# Patient Record
Sex: MALE | Race: WHITE | Employment: OTHER | URBAN - METROPOLITAN AREA
[De-identification: names, ages, dates, MRNs, and addresses within clinical notes are randomized per-mention and may not be internally consistent; named-entity substitution may affect disease eponyms.]

---

## 2019-07-11 ENCOUNTER — APPOINTMENT (OUTPATIENT)
Dept: CT IMAGING | Age: 79
End: 2019-07-11
Payer: MEDICARE

## 2019-07-11 ENCOUNTER — HOSPITAL ENCOUNTER (OUTPATIENT)
Age: 79
Setting detail: OBSERVATION
Discharge: HOME | End: 2019-07-13
Attending: EMERGENCY MEDICINE | Admitting: EMERGENCY MEDICINE
Payer: MEDICARE

## 2019-07-11 ENCOUNTER — APPOINTMENT (OUTPATIENT)
Dept: GENERAL RADIOLOGY | Age: 79
End: 2019-07-11
Payer: MEDICARE

## 2019-07-11 DIAGNOSIS — N40.0 ENLARGED PROSTATE: ICD-10-CM

## 2019-07-11 DIAGNOSIS — I20.0 UNSTABLE ANGINA (HCC): Primary | ICD-10-CM

## 2019-07-11 DIAGNOSIS — K44.9 HIATAL HERNIA: ICD-10-CM

## 2019-07-11 DIAGNOSIS — R91.1 PULMONARY NODULE: ICD-10-CM

## 2019-07-11 DIAGNOSIS — I25.10 CORONARY ARTERY CALCIFICATION SEEN ON CAT SCAN: ICD-10-CM

## 2019-07-11 DIAGNOSIS — R51.9 ACUTE NONINTRACTABLE HEADACHE, UNSPECIFIED HEADACHE TYPE: ICD-10-CM

## 2019-07-11 PROBLEM — R07.9 CHEST PAIN: Status: ACTIVE | Noted: 2019-07-11

## 2019-07-11 LAB
A/G RATIO: 1.2 (ref 1.1–2.2)
ALBUMIN SERPL-MCNC: 3.6 G/DL (ref 3.4–5)
ALP BLD-CCNC: 50 U/L (ref 40–129)
ALT SERPL-CCNC: 11 U/L (ref 10–40)
ANION GAP SERPL CALCULATED.3IONS-SCNC: 9 MMOL/L (ref 3–16)
AST SERPL-CCNC: 14 U/L (ref 15–37)
BASOPHILS ABSOLUTE: 0.1 K/UL (ref 0–0.2)
BASOPHILS RELATIVE PERCENT: 1.3 %
BILIRUB SERPL-MCNC: 0.3 MG/DL (ref 0–1)
BUN BLDV-MCNC: 24 MG/DL (ref 7–20)
CALCIUM SERPL-MCNC: 8.7 MG/DL (ref 8.3–10.6)
CHLORIDE BLD-SCNC: 102 MMOL/L (ref 99–110)
CO2: 23 MMOL/L (ref 21–32)
CREAT SERPL-MCNC: 1.4 MG/DL (ref 0.8–1.3)
EOSINOPHILS ABSOLUTE: 0.2 K/UL (ref 0–0.6)
EOSINOPHILS RELATIVE PERCENT: 3.9 %
GFR AFRICAN AMERICAN: 59
GFR NON-AFRICAN AMERICAN: 49
GLOBULIN: 2.9 G/DL
GLUCOSE BLD-MCNC: 100 MG/DL (ref 70–99)
HCT VFR BLD CALC: 31.6 % (ref 40.5–52.5)
HEMOGLOBIN: 10.9 G/DL (ref 13.5–17.5)
LYMPHOCYTES ABSOLUTE: 1.5 K/UL (ref 1–5.1)
LYMPHOCYTES RELATIVE PERCENT: 23.8 %
MCH RBC QN AUTO: 34.8 PG (ref 26–34)
MCHC RBC AUTO-ENTMCNC: 34.4 G/DL (ref 31–36)
MCV RBC AUTO: 101.3 FL (ref 80–100)
MONOCYTES ABSOLUTE: 0.5 K/UL (ref 0–1.3)
MONOCYTES RELATIVE PERCENT: 7.3 %
NEUTROPHILS ABSOLUTE: 4 K/UL (ref 1.7–7.7)
NEUTROPHILS RELATIVE PERCENT: 63.7 %
PDW BLD-RTO: 13.6 % (ref 12.4–15.4)
PLATELET # BLD: 227 K/UL (ref 135–450)
PMV BLD AUTO: 7.2 FL (ref 5–10.5)
POTASSIUM SERPL-SCNC: 4.2 MMOL/L (ref 3.5–5.1)
RBC # BLD: 3.12 M/UL (ref 4.2–5.9)
SODIUM BLD-SCNC: 134 MMOL/L (ref 136–145)
TOTAL PROTEIN: 6.5 G/DL (ref 6.4–8.2)
TROPONIN: <0.01 NG/ML
WBC # BLD: 6.3 K/UL (ref 4–11)

## 2019-07-11 PROCEDURE — 6360000002 HC RX W HCPCS: Performed by: EMERGENCY MEDICINE

## 2019-07-11 PROCEDURE — 96374 THER/PROPH/DIAG INJ IV PUSH: CPT

## 2019-07-11 PROCEDURE — 80053 COMPREHEN METABOLIC PANEL: CPT

## 2019-07-11 PROCEDURE — 99285 EMERGENCY DEPT VISIT HI MDM: CPT

## 2019-07-11 PROCEDURE — 93005 ELECTROCARDIOGRAM TRACING: CPT | Performed by: INTERNAL MEDICINE

## 2019-07-11 PROCEDURE — 70450 CT HEAD/BRAIN W/O DYE: CPT

## 2019-07-11 PROCEDURE — G0378 HOSPITAL OBSERVATION PER HR: HCPCS

## 2019-07-11 PROCEDURE — 6370000000 HC RX 637 (ALT 250 FOR IP): Performed by: EMERGENCY MEDICINE

## 2019-07-11 PROCEDURE — 85025 COMPLETE CBC W/AUTO DIFF WBC: CPT

## 2019-07-11 PROCEDURE — 71046 X-RAY EXAM CHEST 2 VIEWS: CPT

## 2019-07-11 PROCEDURE — 74176 CT ABD & PELVIS W/O CONTRAST: CPT

## 2019-07-11 PROCEDURE — 84484 ASSAY OF TROPONIN QUANT: CPT

## 2019-07-11 RX ORDER — ONDANSETRON 2 MG/ML
4 INJECTION INTRAMUSCULAR; INTRAVENOUS EVERY 6 HOURS PRN
Status: DISCONTINUED | OUTPATIENT
Start: 2019-07-11 | End: 2019-07-13 | Stop reason: HOSPADM

## 2019-07-11 RX ORDER — LATANOPROST 50 UG/ML
1 SOLUTION/ DROPS OPHTHALMIC NIGHTLY
Status: DISCONTINUED | OUTPATIENT
Start: 2019-07-12 | End: 2019-07-13 | Stop reason: HOSPADM

## 2019-07-11 RX ORDER — ATORVASTATIN CALCIUM 40 MG/1
40 TABLET, FILM COATED ORAL NIGHTLY
Status: DISCONTINUED | OUTPATIENT
Start: 2019-07-12 | End: 2019-07-13 | Stop reason: HOSPADM

## 2019-07-11 RX ORDER — NITROGLYCERIN 0.4 MG/1
0.4 TABLET SUBLINGUAL EVERY 5 MIN PRN
Status: COMPLETED | OUTPATIENT
Start: 2019-07-11 | End: 2019-07-12

## 2019-07-11 RX ORDER — LISINOPRIL 10 MG/1
10 TABLET ORAL
COMMUNITY

## 2019-07-11 RX ORDER — ACETAMINOPHEN 325 MG/1
650 TABLET ORAL ONCE
Status: COMPLETED | OUTPATIENT
Start: 2019-07-11 | End: 2019-07-11

## 2019-07-11 RX ORDER — VENLAFAXINE HYDROCHLORIDE 37.5 MG/1
75 CAPSULE, EXTENDED RELEASE ORAL DAILY
Status: DISCONTINUED | OUTPATIENT
Start: 2019-07-12 | End: 2019-07-13 | Stop reason: HOSPADM

## 2019-07-11 RX ORDER — SODIUM CHLORIDE 0.9 % (FLUSH) 0.9 %
10 SYRINGE (ML) INJECTION EVERY 12 HOURS SCHEDULED
Status: DISCONTINUED | OUTPATIENT
Start: 2019-07-12 | End: 2019-07-13 | Stop reason: HOSPADM

## 2019-07-11 RX ORDER — BUPROPION HYDROCHLORIDE 150 MG/1
150 TABLET, EXTENDED RELEASE ORAL 2 TIMES DAILY
Status: DISCONTINUED | OUTPATIENT
Start: 2019-07-12 | End: 2019-07-13 | Stop reason: HOSPADM

## 2019-07-11 RX ORDER — ASPIRIN 81 MG/1
81 TABLET, CHEWABLE ORAL DAILY
Status: DISCONTINUED | OUTPATIENT
Start: 2019-07-12 | End: 2019-07-13 | Stop reason: HOSPADM

## 2019-07-11 RX ORDER — LISINOPRIL 10 MG/1
10 TABLET ORAL DAILY
Status: DISCONTINUED | OUTPATIENT
Start: 2019-07-12 | End: 2019-07-12

## 2019-07-11 RX ORDER — CARVEDILOL 6.25 MG/1
12.5 TABLET ORAL 2 TIMES DAILY WITH MEALS
Status: DISCONTINUED | OUTPATIENT
Start: 2019-07-12 | End: 2019-07-13 | Stop reason: HOSPADM

## 2019-07-11 RX ORDER — MAGNESIUM SULFATE 1 G/100ML
1 INJECTION INTRAVENOUS PRN
Status: DISCONTINUED | OUTPATIENT
Start: 2019-07-11 | End: 2019-07-13 | Stop reason: HOSPADM

## 2019-07-11 RX ORDER — POTASSIUM CHLORIDE 7.45 MG/ML
10 INJECTION INTRAVENOUS PRN
Status: DISCONTINUED | OUTPATIENT
Start: 2019-07-11 | End: 2019-07-13 | Stop reason: HOSPADM

## 2019-07-11 RX ORDER — SODIUM CHLORIDE 0.9 % (FLUSH) 0.9 %
10 SYRINGE (ML) INJECTION PRN
Status: DISCONTINUED | OUTPATIENT
Start: 2019-07-11 | End: 2019-07-13 | Stop reason: HOSPADM

## 2019-07-11 RX ORDER — CARVEDILOL 12.5 MG/1
12.5 TABLET ORAL 2 TIMES DAILY
COMMUNITY

## 2019-07-11 RX ORDER — ONDANSETRON 2 MG/ML
4 INJECTION INTRAMUSCULAR; INTRAVENOUS ONCE
Status: COMPLETED | OUTPATIENT
Start: 2019-07-11 | End: 2019-07-11

## 2019-07-11 RX ORDER — POTASSIUM CHLORIDE 20 MEQ/1
40 TABLET, EXTENDED RELEASE ORAL PRN
Status: DISCONTINUED | OUTPATIENT
Start: 2019-07-11 | End: 2019-07-13 | Stop reason: HOSPADM

## 2019-07-11 RX ADMIN — ONDANSETRON 4 MG: 2 INJECTION INTRAMUSCULAR; INTRAVENOUS at 17:25

## 2019-07-11 RX ADMIN — ACETAMINOPHEN 650 MG: 325 TABLET ORAL at 22:22

## 2019-07-11 ASSESSMENT — PAIN DESCRIPTION - LOCATION
LOCATION: HEAD
LOCATION: ARM;SHOULDER
LOCATION: HEAD

## 2019-07-11 ASSESSMENT — PAIN SCALES - GENERAL
PAINLEVEL_OUTOF10: 8

## 2019-07-11 ASSESSMENT — PAIN DESCRIPTION - PAIN TYPE
TYPE: ACUTE PAIN
TYPE: ACUTE PAIN
TYPE: CHRONIC PAIN

## 2019-07-11 NOTE — ED PROVIDER NOTES
shortness of breath. Negative for cough and stridor. Cardiovascular: Positive for chest pain. Gastrointestinal: Positive for abdominal distention, abdominal pain and nausea. Negative for diarrhea and vomiting. Genitourinary: Negative for flank pain. Musculoskeletal: Positive for back pain. Negative for neck pain and neck stiffness. Skin: Negative for color change. Neurological: Positive for light-headedness and headaches (chronic per patient). Negative for weakness. Psychiatric/Behavioral: Negative for confusion. Positives and Pertinent negatives as per HPI. PASTMEDICAL HISTORY     Past Medical History:   Diagnosis Date    Asthma     CAD (coronary artery disease)     Hyperlipidemia     Prostate cancer ([facility] [address]          SURGICAL HISTORY       Past Surgical History:   Procedure Laterality Date    BUNIONECTOMY      EYE SURGERY      Retinal reattachment - bilateral    JOINT REPLACEMENT      Bilateral knees    KNEE SURGERY           CURRENT MEDICATIONS       Current Discharge Medication List      CONTINUE these medications which have NOT CHANGED    Details   carvedilol (COREG) 12.5 MG tablet Take 12.5 mg by mouth 2 times daily       lisinopril (PRINIVIL;ZESTRIL) 10 MG tablet Take 10 mg by mouth      latanoprost (XALATAN) 0.005 % ophthalmic solution 1 drop nightly. metronidazole (METROCREAM) 0.75 % cream Apply  topically daily. Apply topically 2 times daily. buPROPion (WELLBUTRIN SR) 150 MG SR tablet Take 150 mg by mouth 2 times daily. venlafaxine (EFFEXOR-XR) 75 MG XR capsule Take 75 mg by mouth daily. ALLERGIES     Pcn [penicillins]; Percocet [oxycodone-acetaminophen]; and Vicodin [hydrocodone-acetaminophen]    FAMILY HISTORY     History reviewed. No pertinent family history.        SOCIAL HISTORY       Social History     Socioeconomic History    Marital status:      Spouse name: None    Number of children: None    Years of education: None    Highest Narrative:     Performed at:  [facility]) [address], [place]   Phone [phone]   TROPONIN    Narrative:     Performed at:  [facility]) - [address], [address]   Phone [phone]   TROPONIN    Narrative:     Performed at:  [facility]) [address], [place]   Phone [phone]   URINALYSIS   TROPONIN   CBC   LIPID PANEL       All other labs were within normal range or not returned asof this dictation. EKG: All EKG's are interpreted by the Emergency Department Physician who either signs or Co-signs this chart in the absence of a cardiologist.    The Ekg interpreted by me shows  normal sinus rhythm with a rate of 63 with first degree AV block   Axis is   Normal  QTc is  normal  Intervals and Durations are unremarkable besides first degree block      ST Segments: no acute change and nonspecific changes  No significant change from prior EKG dated - 5/24/17  No STEMI           RADIOLOGY:   Non-plain film images such as CT, Ultrasound and MRI are read by the radiologist. Beatricerachell Manjinder images are visualized and preliminarily interpreted by the  ED Provider with the belowfindings:        Interpretation per the Radiologist below, if available at the time of this note:    CT ABDOMEN PELVIS WO CONTRAST   Final Result   Head:      Atrophy and small vessel ischemic disease. Abdomen pelvis:      Prostatomegaly, which can be on the basis of hypertrophy or carcinoma. Correlate with urologic history. Nonobstructing left nephrolithiasis. Mild bilateral hydroureter, for which   bladder outlet obstruction could be considered given the prostatic   enlargement. Moderate hiatal hernia. Bibasilar ground-glass and interstitial opacity which can reflect pneumonitis   or interstitial lung disease. 6 mm left lower lobe pulmonary nodule is also   seen. was obtained for abdominal distention but showed no significant findings and he is aware of the incidental findings and has known prostate cancer. Troponin was negative. EKG showed no STEMI. Repeat evaluation prior to admission was completed and he was still pain-free. He still had a headache and therefore I gave him some Tylenol. Heart score equals 7 and therefore he will need cardiology to see him to discuss possible catheterization versus starting with a stress test.  He was well-appearing and in no acute distress at time of admission and stable for the floor with telemetry. The patient tolerated their visit well. The patient and / or the family were informed of the results of any tests, a time was given to answer questions. FINAL IMPRESSION      1. Unstable angina ([address]    2. Acute nonintractable headache, unspecified headache type    3. Pulmonary nodule    4. Enlarged prostate    5. Hiatal hernia    6.  Coronary artery calcification seen on CAT scan          DISPOSITION/PLAN   DISPOSITION Admitted 07/11/2019 10:55:45 PM      PATIENT REFERRED TO:  Jean Toscano MD  [address]            DISCHARGEMEDICATIONS:  Current Discharge Medication List          DISCONTINUED MEDICATIONS:  Current Discharge Medication List      STOP taking these medications       pravastatin (PRAVACHOL) 40 MG tablet Comments:   Reason for Stopping:         potassium chloride (MICRO-K) 10 MEQ CR capsule Comments:   Reason for Stopping:         metoprolol (LOPRESSOR) 25 MG tablet Comments:   Reason for Stopping:         bicalutamide (CASODEX) 50 MG tablet Comments:   Reason for Stopping:         omeprazole (PRILOSEC) 20 MG capsule Comments:   Reason for Stopping:                      (Please note that portions of this note were completed with a voicerecognition program.  Efforts were made to edit the dictations but occasionally words are mis-transcribed.)    Chano Churchill MD

## 2019-07-12 ENCOUNTER — APPOINTMENT (OUTPATIENT)
Dept: NUCLEAR MEDICINE | Age: 79
End: 2019-07-12
Payer: MEDICARE

## 2019-07-12 ENCOUNTER — APPOINTMENT (OUTPATIENT)
Dept: CT IMAGING | Age: 79
End: 2019-07-12
Payer: MEDICARE

## 2019-07-12 PROBLEM — I25.10 CAD (CORONARY ARTERY DISEASE): Status: ACTIVE | Noted: 2019-07-12

## 2019-07-12 PROBLEM — I10 HTN (HYPERTENSION): Status: ACTIVE | Noted: 2019-07-12

## 2019-07-12 PROBLEM — R51.9 HEADACHE: Status: ACTIVE | Noted: 2019-07-12

## 2019-07-12 LAB
ANION GAP SERPL CALCULATED.3IONS-SCNC: 12 MMOL/L (ref 3–16)
BUN BLDV-MCNC: 21 MG/DL (ref 7–20)
CALCIUM SERPL-MCNC: 9 MG/DL (ref 8.3–10.6)
CHLORIDE BLD-SCNC: 100 MMOL/L (ref 99–110)
CHOLESTEROL, TOTAL: 156 MG/DL (ref 0–199)
CO2: 22 MMOL/L (ref 21–32)
CREAT SERPL-MCNC: 1.2 MG/DL (ref 0.8–1.3)
EKG ATRIAL RATE: 63 BPM
EKG ATRIAL RATE: 68 BPM
EKG DIAGNOSIS: NORMAL
EKG DIAGNOSIS: NORMAL
EKG P AXIS: 43 DEGREES
EKG P AXIS: 49 DEGREES
EKG P-R INTERVAL: 206 MS
EKG P-R INTERVAL: 228 MS
EKG Q-T INTERVAL: 404 MS
EKG Q-T INTERVAL: 406 MS
EKG QRS DURATION: 72 MS
EKG QRS DURATION: 90 MS
EKG QTC CALCULATION (BAZETT): 413 MS
EKG QTC CALCULATION (BAZETT): 431 MS
EKG R AXIS: -8 DEGREES
EKG R AXIS: 4 DEGREES
EKG T AXIS: 12 DEGREES
EKG T AXIS: 31 DEGREES
EKG VENTRICULAR RATE: 63 BPM
EKG VENTRICULAR RATE: 68 BPM
GFR AFRICAN AMERICAN: >60
GFR NON-AFRICAN AMERICAN: 58
GLUCOSE BLD-MCNC: 108 MG/DL (ref 70–99)
HCT VFR BLD CALC: 35.9 % (ref 40.5–52.5)
HDLC SERPL-MCNC: 48 MG/DL (ref 40–60)
HEMOGLOBIN: 12.4 G/DL (ref 13.5–17.5)
LDL CHOLESTEROL CALCULATED: 83 MG/DL
LV EF: 55 %
LV EF: 68 %
LVEF MODALITY: NORMAL
LVEF MODALITY: NORMAL
MCH RBC QN AUTO: 34.7 PG (ref 26–34)
MCHC RBC AUTO-ENTMCNC: 34.6 G/DL (ref 31–36)
MCV RBC AUTO: 100.3 FL (ref 80–100)
PDW BLD-RTO: 13.6 % (ref 12.4–15.4)
PLATELET # BLD: 238 K/UL (ref 135–450)
PMV BLD AUTO: 7.2 FL (ref 5–10.5)
POTASSIUM REFLEX MAGNESIUM: 4 MMOL/L (ref 3.5–5.1)
RBC # BLD: 3.58 M/UL (ref 4.2–5.9)
SODIUM BLD-SCNC: 134 MMOL/L (ref 136–145)
TRIGL SERPL-MCNC: 125 MG/DL (ref 0–150)
TROPONIN: <0.01 NG/ML
TROPONIN: <0.01 NG/ML
VLDLC SERPL CALC-MCNC: 25 MG/DL
WBC # BLD: 7 K/UL (ref 4–11)

## 2019-07-12 PROCEDURE — 96375 TX/PRO/DX INJ NEW DRUG ADDON: CPT

## 2019-07-12 PROCEDURE — 3430000000 HC RX DIAGNOSTIC RADIOPHARMACEUTICAL: Performed by: INTERNAL MEDICINE

## 2019-07-12 PROCEDURE — 80061 LIPID PANEL: CPT

## 2019-07-12 PROCEDURE — 36415 COLL VENOUS BLD VENIPUNCTURE: CPT

## 2019-07-12 PROCEDURE — 96376 TX/PRO/DX INJ SAME DRUG ADON: CPT

## 2019-07-12 PROCEDURE — 80048 BASIC METABOLIC PNL TOTAL CA: CPT

## 2019-07-12 PROCEDURE — 78452 HT MUSCLE IMAGE SPECT MULT: CPT

## 2019-07-12 PROCEDURE — G0378 HOSPITAL OBSERVATION PER HR: HCPCS

## 2019-07-12 PROCEDURE — 75571 CT HRT W/O DYE W/CA TEST: CPT

## 2019-07-12 PROCEDURE — 93306 TTE W/DOPPLER COMPLETE: CPT

## 2019-07-12 PROCEDURE — 93005 ELECTROCARDIOGRAM TRACING: CPT | Performed by: EMERGENCY MEDICINE

## 2019-07-12 PROCEDURE — A9502 TC99M TETROFOSMIN: HCPCS | Performed by: INTERNAL MEDICINE

## 2019-07-12 PROCEDURE — 99204 OFFICE O/P NEW MOD 45 MIN: CPT | Performed by: INTERNAL MEDICINE

## 2019-07-12 PROCEDURE — 6360000002 HC RX W HCPCS: Performed by: NURSE PRACTITIONER

## 2019-07-12 PROCEDURE — 93010 ELECTROCARDIOGRAM REPORT: CPT | Performed by: INTERNAL MEDICINE

## 2019-07-12 PROCEDURE — 2580000003 HC RX 258: Performed by: INTERNAL MEDICINE

## 2019-07-12 PROCEDURE — 6360000002 HC RX W HCPCS: Performed by: INTERNAL MEDICINE

## 2019-07-12 PROCEDURE — 6370000000 HC RX 637 (ALT 250 FOR IP): Performed by: INTERNAL MEDICINE

## 2019-07-12 PROCEDURE — 85027 COMPLETE CBC AUTOMATED: CPT

## 2019-07-12 PROCEDURE — 93017 CV STRESS TEST TRACING ONLY: CPT

## 2019-07-12 PROCEDURE — 84484 ASSAY OF TROPONIN QUANT: CPT

## 2019-07-12 RX ORDER — TRAMADOL HYDROCHLORIDE 50 MG/1
50 TABLET ORAL EVERY 6 HOURS PRN
Status: DISCONTINUED | OUTPATIENT
Start: 2019-07-12 | End: 2019-07-13 | Stop reason: HOSPADM

## 2019-07-12 RX ORDER — OXYCODONE HYDROCHLORIDE AND ACETAMINOPHEN 5; 325 MG/1; MG/1
1 TABLET ORAL EVERY 6 HOURS PRN
Status: DISCONTINUED | OUTPATIENT
Start: 2019-07-12 | End: 2019-07-13 | Stop reason: HOSPADM

## 2019-07-12 RX ORDER — HYDRALAZINE HYDROCHLORIDE 20 MG/ML
10 INJECTION INTRAMUSCULAR; INTRAVENOUS EVERY 6 HOURS PRN
Status: DISCONTINUED | OUTPATIENT
Start: 2019-07-12 | End: 2019-07-13 | Stop reason: HOSPADM

## 2019-07-12 RX ADMIN — HYDRALAZINE HYDROCHLORIDE 10 MG: 20 INJECTION INTRAMUSCULAR; INTRAVENOUS at 20:41

## 2019-07-12 RX ADMIN — NITROGLYCERIN 0.4 MG: 0.4 TABLET, ORALLY DISINTEGRATING SUBLINGUAL at 04:56

## 2019-07-12 RX ADMIN — TETROFOSMIN 30 MILLICURIE: 1.38 INJECTION, POWDER, LYOPHILIZED, FOR SOLUTION INTRAVENOUS at 15:55

## 2019-07-12 RX ADMIN — REGADENOSON 0.4 MG: 0.08 INJECTION, SOLUTION INTRAVENOUS at 14:30

## 2019-07-12 RX ADMIN — HYDRALAZINE HYDROCHLORIDE 10 MG: 20 INJECTION INTRAMUSCULAR; INTRAVENOUS at 02:17

## 2019-07-12 RX ADMIN — TRAMADOL HYDROCHLORIDE 50 MG: 50 TABLET, FILM COATED ORAL at 20:41

## 2019-07-12 RX ADMIN — CARVEDILOL 12.5 MG: 6.25 TABLET, FILM COATED ORAL at 17:49

## 2019-07-12 RX ADMIN — Medication 10 ML: at 20:42

## 2019-07-12 RX ADMIN — NITROGLYCERIN 0.4 MG: 0.4 TABLET, ORALLY DISINTEGRATING SUBLINGUAL at 04:43

## 2019-07-12 RX ADMIN — NITROGLYCERIN 0.4 MG: 0.4 TABLET, ORALLY DISINTEGRATING SUBLINGUAL at 04:49

## 2019-07-12 RX ADMIN — ASPIRIN 81 MG 81 MG: 81 TABLET ORAL at 10:12

## 2019-07-12 RX ADMIN — VENLAFAXINE HYDROCHLORIDE 75 MG: 37.5 CAPSULE, EXTENDED RELEASE ORAL at 10:12

## 2019-07-12 RX ADMIN — ATORVASTATIN CALCIUM 40 MG: 40 TABLET, FILM COATED ORAL at 20:41

## 2019-07-12 RX ADMIN — BUPROPION HYDROCHLORIDE 150 MG: 150 TABLET, EXTENDED RELEASE ORAL at 20:41

## 2019-07-12 RX ADMIN — ATORVASTATIN CALCIUM 40 MG: 40 TABLET, FILM COATED ORAL at 02:16

## 2019-07-12 RX ADMIN — TETROFOSMIN 10.2 MILLICURIE: 1.38 INJECTION, POWDER, LYOPHILIZED, FOR SOLUTION INTRAVENOUS at 13:29

## 2019-07-12 RX ADMIN — LATANOPROST 1 DROP: 50 SOLUTION OPHTHALMIC at 20:42

## 2019-07-12 RX ADMIN — BUPROPION HYDROCHLORIDE 150 MG: 150 TABLET, EXTENDED RELEASE ORAL at 10:12

## 2019-07-12 RX ADMIN — Medication 10 ML: at 10:14

## 2019-07-12 ASSESSMENT — PAIN SCALES - GENERAL
PAINLEVEL_OUTOF10: 4
PAINLEVEL_OUTOF10: 8
PAINLEVEL_OUTOF10: 0

## 2019-07-12 ASSESSMENT — ENCOUNTER SYMPTOMS
NAUSEA: 1
COLOR CHANGE: 0
DIARRHEA: 0
BACK PAIN: 1
ABDOMINAL PAIN: 1
STRIDOR: 0
CHEST TIGHTNESS: 1
VOMITING: 0
ABDOMINAL DISTENTION: 1
COUGH: 0
SHORTNESS OF BREATH: 1

## 2019-07-12 ASSESSMENT — PAIN DESCRIPTION - PAIN TYPE: TYPE: ACUTE PAIN

## 2019-07-12 ASSESSMENT — HEART SCORE: ECG: 1

## 2019-07-12 ASSESSMENT — PAIN DESCRIPTION - LOCATION: LOCATION: HEAD

## 2019-07-12 ASSESSMENT — PAIN - FUNCTIONAL ASSESSMENT: PAIN_FUNCTIONAL_ASSESSMENT: 0-10

## 2019-07-12 NOTE — ED NOTES
[facility] per [facility]  RE: chest pain, coronary artery disease  [email or web address]     Heriberto Escobar  07/11/19 2202

## 2019-07-12 NOTE — PROGRESS NOTES
2.5 oz (85.8 kg)   SpO2 92%   BMI 27.93 kg/m²     General appearance:  No apparent distress, appears stated age and cooperative. HEENT:  Normal cephalic, atraumatic without obvious deformity. Pupils equal, round, and reactive to light. Extra ocular muscles intact. Conjunctivae/corneas clear. Neck: Supple, with full range of motion. No jugular venous distention. Trachea midline. Respiratory:  Normal respiratory effort. Clear to auscultation, bilaterally without Rales/Wheezes/Rhonchi. Cardiovascular:  Regular rate and rhythm with normal S1/S2 without murmurs, rubs or gallops. Abdomen: Soft , nontender,    Very mild distention on palpation  Musculoskeletal: Bilateral legs edema, no clubbing or cyanosis   Peripheral Pulses: +2 palpable, equal bilaterally   Skin: Skin color, texture, turgor normal.  No rashes or lesions. Neurologic:  Neurovascularly intact without any focal sensory/motor deficits. Cranial nerves: II-XII intact, grossly non-focal.  Psychiatric:  Alert and oriented, thought content appropriate, normal insight  Capillary Refill: Brisk,< 3 seconds     Labs:   Recent Labs     07/11/19  1630 07/12/19  0302   WBC 6.3 7.0   HGB 10.9* 12.4*   HCT 31.6* 35.9*    238     Recent Labs     07/11/19  1630   *   K 4.2      CO2 23   BUN 24*   CREATININE 1.4*   CALCIUM 8.7     Recent Labs     07/11/19  1630   AST 14*   ALT 11   BILITOT 0.3   ALKPHOS 50     No results for input(s): INR in the last 72 hours.   Recent Labs     07/11/19  1630 07/12/19  0003 07/12/19  0302   TROPONINI <0.01 <0.01 <0.01       Urinalysis:      Lab Results   Component Value Date    NITRU Neg 09/02/2010    WBCUA Rare 09/02/2010    BACTERIA trace 09/02/2010    RBCUA None seen 09/02/2010    BLOODU negative 03/18/2013    BLOODU NEGATIVE 09/02/2010    SPECGRAV 1.015 03/18/2013    SPECGRAV 1.010 09/02/2010    GLUCOSEU negative 03/18/2013    GLUCOSEU 0.0 09/04/2010    GLUCOSEU NEGATIVE 09/02/2010       Radiology:  CT ABDOMEN PELVIS WO CONTRAST   Final Result   Head:      Atrophy and small vessel ischemic disease. Abdomen pelvis:      Prostatomegaly, which can be on the basis of hypertrophy or carcinoma. Correlate with urologic history. Nonobstructing left nephrolithiasis. Mild bilateral hydroureter, for which   bladder outlet obstruction could be considered given the prostatic   enlargement. Moderate hiatal hernia. Bibasilar ground-glass and interstitial opacity which can reflect pneumonitis   or interstitial lung disease. 6 mm left lower lobe pulmonary nodule is also   seen. As clinically warranted, high-resolution chest CT could be performed   in 6 months time for further assessment. Atherosclerosis, including coronary artery calcification. CT Head WO Contrast   Final Result   Head:      Atrophy and small vessel ischemic disease. Abdomen pelvis:      Prostatomegaly, which can be on the basis of hypertrophy or carcinoma. Correlate with urologic history. Nonobstructing left nephrolithiasis. Mild bilateral hydroureter, for which   bladder outlet obstruction could be considered given the prostatic   enlargement. Moderate hiatal hernia. Bibasilar ground-glass and interstitial opacity which can reflect pneumonitis   or interstitial lung disease. 6 mm left lower lobe pulmonary nodule is also   seen. As clinically warranted, high-resolution chest CT could be performed   in 6 months time for further assessment. Atherosclerosis, including coronary artery calcification. XR CHEST STANDARD (2 VW)   Final Result   No acute findings.          CT CARDIAC CALCIUM SCORING    (Results Pending)   NM Cardiac Stress Test Nuclear Imaging    (Results Pending)           Assessment/Plan:    Active Hospital Problems    Diagnosis    Chest pain [R07.9]     Chest pain with known CAD  - EKG, trop unremarkable  - cardiology consulted  - plan for echo, stress test today  - Continue ASA, statin, BB  - tele    Headache, severe  - possibly related to nitro use  - will monitor closely. Has had mild headache for past two weeks before severe headache developed. HTN  - poorly controlled  - continue home coreg  - Cr mildly elevated. Holding Lisinopril for now pending recheck. If stable or lower, willl restart.     Chronic pain  - continue cymbalta    DVT Prophylaxis: lovenox  Diet: Diet NPO, After Midnight  Diet NPO, After Midnight  Code Status: Full Code    PT/OT Eval Status: not ordered    Dispo - possibly today vs tomorrow pending cards work up    Adore Red MD

## 2019-07-12 NOTE — PROGRESS NOTES
Perfect Serve to [employer], CNP:    Pt's /89. Did not take dose of Lisinopril at home, not ordered to start until 0900. Requesting IV BP Med. Also, pt has 8/10 headache that has not been relieved by Tylenol. Please advise. Thank you.     Electronically signed by Jane Mitchell RN on 7/12/2019 at 12:58 AM

## 2019-07-12 NOTE — CONSULTS
[facility]  [phone]      Attending Physician: Yogi Gaines MD  Reason for Consultation/Chief Complaint:  Chest pain, headache    Subjective   History of Present Illness:  Tori Segura is a 77 y.o. patient who presented to the hospital with complaints of headache for about two weeks, he has been taking tylenol but symptoms were worse. He has had chest pain on day of admission on 7/11/19, described as heart burn, was going up stairs, went across back to chest, got nitro in ER and that helped. Mariana Wang He says yesterday aftn after eating a sandwhich, he felt tired, and had chest pain. He gets care at [place] and has been dealing with PNA in past and he has seen doctors at [place] for this. PCP at [place] is Dhiraj Moreno. He says he has been told at [place] that he has had silent heart attacks. 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 Past Medical History:   has a past medical history of Asthma, CAD (coronary artery disease), Hyperlipidemia, and Prostate cancer ([facility] [address] Surgical History:   has a past surgical history that includes knee surgery; Bunionectomy; eye surgery; and joint replacement. Social History:   reports that he has quit smoking. He has never used smokeless tobacco. He reports that he does not drink alcohol or use drugs. Family History:    F:ht disease       Home Medications:  Were reviewed and are listed in nursing record and/or below  Prior to Admission medications    Medication Sig Start Date End Date Taking?  Authorizing Provider   carvedilol (COREG) 12.5 MG tablet Take 12.5 mg by mouth 2 times daily    Yes Historical Provider, MD   lisinopril (PRINIVIL;ZESTRIL) 10 MG tablet Take 10 mg by mouth   Yes Historical Provider, MD   latanoprost reviewed labs and imaging/xray/diagnostic testing in this note. Assessment and Plan          Patient Active Problem List   Diagnosis    Chest pain       Cp/sob, coronay ca+ noted on ct chest, get ct ca score, echo and lexiscan nuc stress test, he believes he had last stress about 3yrs ago at [place], no prior cath per pt, will try to get records    Htn, bblocker/ace    Chronic back pain, prior acupuncture    Thank you for allowing us to participate in the care of Marycruz Leggett. Please call me with any questions [phone].     Yao Crawford MD, [facility]   Interventional Cardiologist  [place]   [phone] [facility]  [phone] [address]  7/12/2019 8:41 AM

## 2019-07-13 VITALS
HEART RATE: 75 BPM | SYSTOLIC BLOOD PRESSURE: 171 MMHG | OXYGEN SATURATION: 96 % | HEIGHT: 69 IN | WEIGHT: 187.1 LBS | DIASTOLIC BLOOD PRESSURE: 90 MMHG | TEMPERATURE: 98.2 F | BODY MASS INDEX: 27.71 KG/M2 | RESPIRATION RATE: 20 BRPM

## 2019-07-13 LAB
BACTERIA: ABNORMAL /HPF
BILIRUBIN URINE: NEGATIVE
BLOOD, URINE: NEGATIVE
CLARITY: CLEAR
COLOR: YELLOW
EPITHELIAL CELLS, UA: ABNORMAL /HPF
GLUCOSE URINE: NEGATIVE MG/DL
KETONES, URINE: NEGATIVE MG/DL
LEUKOCYTE ESTERASE, URINE: NEGATIVE
MICROSCOPIC EXAMINATION: YES
NITRITE, URINE: NEGATIVE
PH UA: 7.5 (ref 5–8)
PROTEIN UA: ABNORMAL MG/DL
RBC UA: ABNORMAL /HPF (ref 0–2)
SPECIFIC GRAVITY UA: 1.01 (ref 1–1.03)
UROBILINOGEN, URINE: 0.2 E.U./DL
WBC UA: ABNORMAL /HPF (ref 0–5)

## 2019-07-13 PROCEDURE — 2580000003 HC RX 258: Performed by: INTERNAL MEDICINE

## 2019-07-13 PROCEDURE — 99214 OFFICE O/P EST MOD 30 MIN: CPT | Performed by: NURSE PRACTITIONER

## 2019-07-13 PROCEDURE — 6370000000 HC RX 637 (ALT 250 FOR IP): Performed by: INTERNAL MEDICINE

## 2019-07-13 PROCEDURE — G0378 HOSPITAL OBSERVATION PER HR: HCPCS

## 2019-07-13 PROCEDURE — 96372 THER/PROPH/DIAG INJ SC/IM: CPT

## 2019-07-13 PROCEDURE — 6360000002 HC RX W HCPCS: Performed by: INTERNAL MEDICINE

## 2019-07-13 PROCEDURE — 81001 URINALYSIS AUTO W/SCOPE: CPT

## 2019-07-13 RX ORDER — ASPIRIN 81 MG/1
81 TABLET, CHEWABLE ORAL DAILY
Qty: 30 TABLET | Refills: 0 | Status: SHIPPED | OUTPATIENT
Start: 2019-07-14

## 2019-07-13 RX ORDER — ATORVASTATIN CALCIUM 80 MG/1
80 TABLET, FILM COATED ORAL NIGHTLY
Qty: 30 TABLET | Refills: 5 | Status: SHIPPED | OUTPATIENT
Start: 2019-07-13

## 2019-07-13 RX ORDER — ATORVASTATIN CALCIUM 40 MG/1
40 TABLET, FILM COATED ORAL NIGHTLY
Qty: 30 TABLET | Refills: 0 | Status: SHIPPED | OUTPATIENT
Start: 2019-07-13 | End: 2019-07-13

## 2019-07-13 RX ORDER — NITROGLYCERIN 0.4 MG/1
0.4 TABLET SUBLINGUAL EVERY 5 MIN PRN
Qty: 25 TABLET | Refills: 3 | Status: SHIPPED | OUTPATIENT
Start: 2019-07-13

## 2019-07-13 RX ADMIN — CARVEDILOL 12.5 MG: 6.25 TABLET, FILM COATED ORAL at 09:13

## 2019-07-13 RX ADMIN — ASPIRIN 81 MG 81 MG: 81 TABLET ORAL at 09:13

## 2019-07-13 RX ADMIN — Medication 10 ML: at 09:13

## 2019-07-13 RX ADMIN — VENLAFAXINE HYDROCHLORIDE 75 MG: 37.5 CAPSULE, EXTENDED RELEASE ORAL at 09:13

## 2019-07-13 RX ADMIN — BUPROPION HYDROCHLORIDE 150 MG: 150 TABLET, EXTENDED RELEASE ORAL at 09:13

## 2019-07-13 RX ADMIN — ENOXAPARIN SODIUM 40 MG: 40 INJECTION SUBCUTANEOUS at 09:13

## 2019-07-13 NOTE — PROGRESS NOTES
sclera nonicteric  Neck:  Supple, no masses, no thyroidmegaly, no JVD  Skin:  Warm and dry; no rash or lesions  Heart: Regular, normal apex, S1 and S2 normal, no M/G/R  Lungs:  Normal respiratory effort; clear; no wheezing/rhonchi/rales  Abdomen: soft, non tender, + bowel sounds  Extremities:  No edema or cyanosis; no clubbing  Neuro: alert and oriented, moves legs and arms equally, normal mood and affect      Data Reviewed:  EKG 7/12/2019:  Normal sinus rhythmNormal ECG  When compared with ECG of 11-JUL-2019 16:27,Criteria for Inferior infarct are no longer PresentST no longer elevated in Lateral leadsConfirmed by Aris Ruff ([id number]) on 7/12/2019 6:15:16 PM    Echo 7/12/2019:  Normal left ventricular systolic function with a visually estimated ejection  fraction of 55%. No regional wall motion abnormalities are seen. Grade I diastolic dysfunction with normal LV filling pressures. The right atrium is moderately enlarged. Mild aortic and mitral regurgitation. Moderate pulmonic regurgitation. CT Cardiac Calcium Score 7/12/2019: Total Agatston calcium score of 2218 is grossly elevated and represents a   high likelihood of significant coronary artery disease as discussed below.       Moderate interstitial fibrotic changes with the lungs, nonspecific.  It   should be noted that the lungs are not entirely included.       Moderate size hiatal hernia.           Stress Test 7/12/2019:  Normal LV size and systolic function. Left ventricular ejection fraction of    68%.  Normal wall motion.    There is normal isotope uptake at stress and rest. There is no evidence of    myocardial ischemia or scar.           Lab Reviewed:   Renal Profile:  Lab Results   Component Value Date    CREATININE 1.2 07/12/2019    BUN 21 07/12/2019     07/12/2019    K 4.0 07/12/2019     07/12/2019    CO2 22 07/12/2019     CBC:    Lab Results   Component Value Date    WBC 7.0 07/12/2019    RBC 3.58 07/12/2019    HGB 12.4 07/12/2019

## 2019-07-13 NOTE — DISCHARGE SUMMARY
Hospital Medicine Discharge Summary    Patient ID: Alli Nuñze      Patient's PCP: Monique No MD    Admit Date: 7/11/2019     Discharge Date: 7/13/2019      Admitting Physician: Concepción Baltazar MD     Discharge Physician: Gilbert Ramos MD     Discharge Diagnoses: Active Hospital Problems    Diagnosis    HTN (hypertension) [I10]    CAD (coronary artery disease) [I25.10]    Headache [R51]    Chest pain [R07.9]       The patient was seen and examined on day of discharge and this discharge summary is in conjunction with any daily progress note from day of discharge. Hospital Course:   77 y. o. male who presented to [address] complains on chest pain, helped with aspirin and NTG. Currently,  chest pain free. Pt is already on B blockers ad ACE inh.  He states that he has a history of MIs in the past, never had any recent cardiac catheterization     Described the pain as a  feeling of heartburn and severe heaviness on both sides of his chest with some radiation down his left arm and into his back.    He also states that over the last couple of  weeks he is feeling more fatigued than normal.      Pt  will be left for close monitoring of his condition, repeat troponins and EKG ( so far, done in ER-- unremarkable) and obtaining cardiology consult in AM     Chest pain with known CAD  - EKG, trop unremarkable  - cardiology consulted  - Echo with EF 77%, grade I diastolic dysfunction  -  stress test without ischemia  - CT Ca score positive  - Continue ASA, statin, BB.  Started PRN nitro on discharge  - Given CT Ca score, cardiology recommending outpatient Angiogram.      Headache, severe  - possibly related to nitro use  - improved with conservative management     HTN  - poorly controlled  - continue home coreg  - Cr at baseline prior to discharge and lisinopril was resumed.     Chronic pain  - continue cymbalta    Physical Exam Performed:     BP (!) 171/90   Pulse 75   Temp 98.2 °F (36.8 °C) (Oral)   Resp 20   Ht 5' 9\" (1.753 m)   Wt 187 lb 1.6 oz (84.9 kg)   SpO2 96%   BMI 27.63 kg/m²       General appearance:  No apparent distress, appears stated age and cooperative. HEENT:  Normal cephalic, atraumatic without obvious deformity. Pupils equal, round, and reactive to light.  Extra ocular muscles intact. Conjunctivae/corneas clear. Neck: Supple, with full range of motion. No jugular venous distention. Trachea midline. Respiratory:  Normal respiratory effort. Clear to auscultation, bilaterally without Rales/Wheezes/Rhonchi. Cardiovascular:  Regular rate and rhythm with normal S1/S2 without murmurs, rubs or gallops. Abdomen: Soft , nontender,    Very mild distention on palpation  Musculoskeletal: Bilateral legs edema, no clubbing or cyanosis   Peripheral Pulses: +2 palpable, equal bilaterally   Skin: Skin color, texture, turgor normal.  No rashes or lesions. Neurologic:  Neurovascularly intact without any focal sensory/motor deficits. Cranial nerves: II-XII intact, grossly non-focal.  Psychiatric:  Alert and oriented, thought content appropriate, normal insight  Capillary Refill: Brisk,< 3 seconds     Labs: For convenience and continuity at follow-up the following most recent labs are provided:      CBC:    Lab Results   Component Value Date    WBC 7.0 07/12/2019    HGB 12.4 07/12/2019    HCT 35.9 07/12/2019     07/12/2019       Renal:    Lab Results   Component Value Date     07/12/2019    K 4.0 07/12/2019     07/12/2019    CO2 22 07/12/2019    BUN 21 07/12/2019    CREATININE 1.2 07/12/2019    CALCIUM 9.0 07/12/2019    PHOS 3.7 09/04/2010         Significant Diagnostic Studies    Radiology:   NM Cardiac Stress Test Nuclear Imaging   Final Result      CT CARDIAC CALCIUM SCORING   Final Result   Total Agatston calcium score of 2218 is grossly elevated and represents a   high likelihood of significant coronary artery disease as discussed below.       Moderate interstitial fibrotic changes with the lungs, nonspecific. It   should be noted that the lungs are not entirely included. Moderate size hiatal hernia. Calcium Score Interpretation      0  No identifiable atherosclerotic plaque. Very low cardiovascular disease   risk. Less than 5% chance of presence of coronary artery disease. A   negative examination. 1-10 Minimal plaque burden. Significant coronary artery disease very   unlikely.  Mild plaque burden. Likely mild or minimal coronary stenosis. 101-400 Moderate plaque burden. Moderate non-obstructive coronary artery   disease highly likely. Over 400 Extensive plaque burden. High likelihood of at least one   significant coronary artery stenosis (>50% diameter). CALCIUM SCORING OVERVIEW:      Coronary calcium is a marker for plaque in a blood vessel or atherosclerosis   (hardening of the arteries). The presence and amount of calcium detected in   the coronary artery by the CT scan estimates the presence and amount of   atherosclerotic plaque. These calcium deposits can appear years before the   development of heart disease symptoms such as chest pain and shortness of   breath. A calcium score is computed for each of the coronary arteries based upon the   volume and density of the calcium deposits. This can be referred to as your   calcified plaque burden. It does not correspond directly to the percentage   of narrowing in the artery, but does correlate with the severity of the   overall coronary atherosclerotic burden. This score is then used to determine the calcium percentile which compares   your calcified plaque burden to that of other asymptomatic men and women of   the same age.   The calcium score, in combination with the percentile, enables   your physician to determine your risk of developing symptomatic coronary   artery disease, and to measure the progression of disease as well as the   effectiveness of

## 2019-07-14 ENCOUNTER — TELEPHONE (OUTPATIENT)
Dept: CARDIOLOGY | Age: 79
End: 2019-07-14

## 2019-07-15 NOTE — TELEPHONE ENCOUNTER
Lets call patient as per hospital encounter/notes to schedule an appt (ok to overbook with me) to discuss cath in next 1-2 weeks. ----- Message from DYLAN Dietz CNP sent at 7/14/2019  1:50 PM EDT -----  Regarding: CT Ca+ score  CT Ca+ score came back LM-0, RCA 1046, LAD 1048, Cx 124 for a total of 2218. Echo- 55% LVEF no regional wall motion abnormalities, grade 1 DD with normal filling pressures, moderately enlarged right atrium, mild aortic and mitral regurgitation, moderate pulmonary reg.    7/12/19 Stress Test: Normal isotope uptake at stress and rest.    Dr. Mikki Ruiz felt ok to discharge patient, started on Lipitor 80 ( he was not on any statin prior to admission) and  PRN Nitro. He wanted to know with the Ca+ score if you wanted to do a cath on him? He is a [place] patient and he stated he would call them on Monday to see what his options are if you want to cath him. Please advise.  Thanks

## 2019-07-15 NOTE — TELEPHONE ENCOUNTER
Spoke with patient. He has to go thru the [address]. If they cannot accommodate him in the appropriate time they will cover him here. He should know by tomorrow and will let us know what they say.

## 2019-08-22 ENCOUNTER — APPOINTMENT (OUTPATIENT)
Dept: CT IMAGING | Age: 79
End: 2019-08-22
Payer: MEDICARE

## 2019-08-22 ENCOUNTER — HOSPITAL ENCOUNTER (EMERGENCY)
Age: 79
Discharge: HOME | End: 2019-08-22
Payer: MEDICARE

## 2019-08-22 VITALS
WEIGHT: 189 LBS | SYSTOLIC BLOOD PRESSURE: 130 MMHG | OXYGEN SATURATION: 97 % | DIASTOLIC BLOOD PRESSURE: 66 MMHG | RESPIRATION RATE: 18 BRPM | BODY MASS INDEX: 27.99 KG/M2 | HEART RATE: 64 BPM | HEIGHT: 69 IN | TEMPERATURE: 98.6 F

## 2019-08-22 DIAGNOSIS — S09.90XA CLOSED HEAD INJURY, INITIAL ENCOUNTER: ICD-10-CM
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
## 2019-08-22 DIAGNOSIS — S80.219A ABRASION OF KNEE, UNSPECIFIED LATERALITY, INITIAL ENCOUNTER: ICD-10-CM

## 2019-08-22 DIAGNOSIS — Z23 NEED FOR DIPHTHERIA-TETANUS-PERTUSSIS (TDAP) VACCINE: ICD-10-CM

## 2019-08-22 DIAGNOSIS — S80.811A ABRASION, LOWER LEG, ANTERIOR, RIGHT, INITIAL ENCOUNTER: Primary | ICD-10-CM

## 2019-08-22 PROCEDURE — 6360000002 HC RX W HCPCS: Performed by: PHYSICIAN ASSISTANT

## 2019-08-22 PROCEDURE — 90471 IMMUNIZATION ADMIN: CPT | Performed by: PHYSICIAN ASSISTANT

## 2019-08-22 PROCEDURE — 72125 CT NECK SPINE W/O DYE: CPT

## 2019-08-22 PROCEDURE — 90715 TDAP VACCINE 7 YRS/> IM: CPT | Performed by: PHYSICIAN ASSISTANT

## 2019-08-22 PROCEDURE — 70450 CT HEAD/BRAIN W/O DYE: CPT

## 2019-08-22 PROCEDURE — 99283 EMERGENCY DEPT VISIT LOW MDM: CPT

## 2019-08-22 RX ORDER — CEPHALEXIN 500 MG/1
500 CAPSULE ORAL 3 TIMES DAILY
Qty: 21 CAPSULE | Refills: 0 | Status: SHIPPED | OUTPATIENT
Start: 2019-08-22 | End: 2019-08-29

## 2019-08-22 RX ADMIN — TETANUS TOXOID, REDUCED DIPHTHERIA TOXOID AND ACELLULAR PERTUSSIS VACCINE, ADSORBED 0.5 ML: 5; 2.5; 8; 8; 2.5 SUSPENSION INTRAMUSCULAR at 12:44

## 2019-08-22 ASSESSMENT — PAIN DESCRIPTION - PAIN TYPE: TYPE: CHRONIC PAIN

## 2019-08-22 ASSESSMENT — PAIN DESCRIPTION - DESCRIPTORS: DESCRIPTORS: SHARP

## 2019-08-22 ASSESSMENT — PAIN SCALES - GENERAL: PAINLEVEL_OUTOF10: 8

## 2019-08-22 ASSESSMENT — PAIN DESCRIPTION - LOCATION: LOCATION: SHOULDER

## 2019-08-22 NOTE — ED PROVIDER NOTES
MEDICAL HISTORY     Past Medical History:   Diagnosis Date    Asthma     CAD (coronary artery disease)     HTN (hypertension) 7/12/2019    Hyperlipidemia     Prostate cancer ([facility] [address]          SURGICAL HISTORY     Past Surgical History:   Procedure Laterality Date    BUNIONECTOMY      EYE SURGERY      Retinal reattachment - bilateral    JOINT REPLACEMENT      Bilateral knees    KNEE SURGERY           CURRENTMEDICATIONS       Previous Medications    ASPIRIN 81 MG CHEWABLE TABLET    Take 1 tablet by mouth daily    ATORVASTATIN (LIPITOR) 80 MG TABLET    Take 1 tablet by mouth nightly    BUPROPION (WELLBUTRIN SR) 150 MG SR TABLET    Take 150 mg by mouth 2 times daily. CARVEDILOL (COREG) 12.5 MG TABLET    Take 12.5 mg by mouth 2 times daily     LATANOPROST (XALATAN) 0.005 % OPHTHALMIC SOLUTION    1 drop nightly. LISINOPRIL (PRINIVIL;ZESTRIL) 10 MG TABLET    Take 10 mg by mouth    METOPROLOL TARTRATE (LOPRESSOR) 25 MG TABLET    Take 25 mg by mouth 2 times daily    METRONIDAZOLE (METROCREAM) 0.75 % CREAM    Apply  topically daily. Apply topically 2 times daily. NITROGLYCERIN (NITROSTAT) 0.4 MG SL TABLET    Place 1 tablet under the tongue every 5 minutes as needed for Chest pain up to max of 3 total doses. If no relief after 1 dose, call 911. VENLAFAXINE (EFFEXOR-XR) 75 MG XR CAPSULE    Take 75 mg by mouth daily. ALLERGIES     Pcn [penicillins]; Percocet [oxycodone-acetaminophen]; and Vicodin [hydrocodone-acetaminophen]    FAMILYHISTORY     History reviewed. No pertinent family history.        SOCIAL HISTORY       Social History     Socioeconomic History    Marital status:      Spouse name: None    Number of children: None    Years of education: None    Highest education level: None   Occupational History    None   Social Needs    Financial resource strain: None    Food insecurity:     Worry: None     Inability: None    Transportation needs:     Medical: None     Non-medical: None Tobacco Use    Smoking status: Former Smoker    Smokeless tobacco: Never Used   Substance and Sexual Activity    Alcohol use: Not Currently    Drug use: No    Sexual activity: Yes     Partners: Female   Lifestyle    Physical activity:     Days per week: None     Minutes per session: None    Stress: None   Relationships    Social connections:     Talks on phone: None     Gets together: None     Attends [employer] service: None     Active member of club or organization: None     Attends meetings of clubs or organizations: None     Relationship status: None    Intimate partner violence:     Fear of current or ex partner: None     Emotionally abused: None     Physically abused: None     Forced sexual activity: None   Other Topics Concern    None   Social History Narrative    None       SCREENINGS    Travis Coma Scale  Eye Opening: Spontaneous  Best Verbal Response: Oriented  Best Motor Response: Obeys commands  Travis Coma Scale Score: 15        PHYSICAL EXAM    (up to 7 for level 4, 8 or more for level 5)     ED Triage Vitals [08/22/19 1156]   BP Temp Temp Source Pulse Resp SpO2 Height Weight   130/66 98.6 °F (37 °C) Temporal 64 18 97 % 5' 9\" (1.753 m) 189 lb (85.7 kg)       Physical Exam   Constitutional: He is oriented to person, place, and time. He appears well-developed and well-nourished. HENT:   Head: Normocephalic and atraumatic. Right Ear: External ear normal.   Left Ear: External ear normal.   The patient exhibits no cranial tenderness. No obvious injury noted. Eyes: Conjunctivae are normal. Right eye exhibits no discharge. Left eye exhibits no discharge. No scleral icterus. Neck: Normal range of motion. Neck supple. The patient exhibits no cervical spine tenderness. Cardiovascular: Normal rate and regular rhythm. Murmur heard. Patient is a grade 2/6 systolic murmur heard along the left sternal border. Pulmonary/Chest: Effort normal and breath sounds normal.   Abdominal: Soft. (85.7 kg)   Height: 5' 9\" (1.753 m)       Patient was given thefollowing medications:  Medications   Tetanus-Diphth-Acell Pertussis (BOOSTRIX) injection 0.5 mL (0.5 mLs Intramuscular Given 8/22/19 1244)       Patient with history of stumble and fall while entering restaurant yesterday evening about 6:30 PM.  Abrasion somewhat deep noted anterior aspect of the right lower leg. Wound care provided. Tetanus updated. Start Keflex. Patient struck head and is on aspirin. CT scan head and neck are negative. Information given regarding head injury and wound care. Recommend follow with healthcare provider next week or return to this facility if symptoms worsen. Recommending Tylenol for pain control. The patient's tetanus shot is updated today. FINAL IMPRESSION      1. Abrasion, lower leg, anterior, right, initial encounter    2. Abrasion of knee, unspecified laterality, initial encounter    3. Fall on same level from slipping, tripping or stumbling, initial encounter    4. Closed head injury, initial encounter    5. Need for diphtheria-tetanus-pertussis (Tdap) vaccine          DISPOSITION/PLAN   DISPOSITION        PATIENT REFERREDTO:  Phan Gay MD  [address] [facility] [address]  [facility] [address]  [phone]    Schedule an appointment as soon as possible for a visit in 11 days      [facility] ED  [address] [facility] [address]  Go to   If symptoms worsen      DISCHARGE MEDICATIONS:  New Prescriptions    CEPHALEXIN (KEFLEX) 500 MG CAPSULE    Take 1 capsule by mouth 3 times daily for 7 days       DISCONTINUED MEDICATIONS:  Discontinued Medications    No medications on file              (Please note that portions ofthis note were completed with a voice recognition program.  Efforts were made to edit the dictations but occasionally words are mis-transcribed. )    Melani Montoya PA-C (electronically signed)           Melani Montoya PA-C  08/22/19 9353

## 2019-11-18 ENCOUNTER — HOSPITAL ENCOUNTER (OUTPATIENT)
Dept: CARDIAC REHAB | Age: 79
Setting detail: THERAPIES SERIES
Discharge: HOME | End: 2019-11-18
Payer: OTHER GOVERNMENT

## 2019-11-27 ENCOUNTER — HOSPITAL ENCOUNTER (OUTPATIENT)
Dept: CARDIAC REHAB | Age: 79
Setting detail: THERAPIES SERIES
Discharge: HOME | End: 2019-11-27
Payer: OTHER GOVERNMENT

## 2019-11-27 PROCEDURE — 93798 PHYS/QHP OP CAR RHAB W/ECG: CPT

## 2019-12-04 ENCOUNTER — HOSPITAL ENCOUNTER (OUTPATIENT)
Dept: CARDIAC REHAB | Age: 79
Setting detail: THERAPIES SERIES
Discharge: HOME | End: 2019-12-04
Payer: OTHER GOVERNMENT

## 2019-12-04 PROCEDURE — 93798 PHYS/QHP OP CAR RHAB W/ECG: CPT

## 2020-08-26 ENCOUNTER — HOSPITAL ENCOUNTER (OUTPATIENT)
Dept: CARDIAC REHAB | Age: 80
Setting detail: THERAPIES SERIES
Discharge: HOME | End: 2020-08-26
Payer: OTHER GOVERNMENT

## 2020-09-02 ENCOUNTER — HOSPITAL ENCOUNTER (OUTPATIENT)
Dept: CARDIAC REHAB | Age: 80
Setting detail: THERAPIES SERIES
Discharge: HOME | End: 2020-09-02
Payer: OTHER GOVERNMENT

## 2020-09-02 PROCEDURE — 93798 PHYS/QHP OP CAR RHAB W/ECG: CPT

## 2023-01-10 ENCOUNTER — APPOINTMENT (OUTPATIENT)
Dept: GENERAL RADIOLOGY | Age: 83
End: 2023-01-10
Payer: OTHER GOVERNMENT

## 2023-01-10 ENCOUNTER — APPOINTMENT (OUTPATIENT)
Dept: CT IMAGING | Age: 83
End: 2023-01-10
Payer: OTHER GOVERNMENT

## 2023-01-10 ENCOUNTER — HOSPITAL ENCOUNTER (EMERGENCY)
Age: 83
Discharge: HOME | End: 2023-01-11
Attending: EMERGENCY MEDICINE
Payer: OTHER GOVERNMENT

## 2023-01-10 DIAGNOSIS — S42.202A CLOSED FRACTURE OF PROXIMAL END OF LEFT HUMERUS, UNSPECIFIED FRACTURE MORPHOLOGY, INITIAL ENCOUNTER: ICD-10-CM
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
## 2023-01-10 PROCEDURE — 71045 X-RAY EXAM CHEST 1 VIEW: CPT

## 2023-01-10 PROCEDURE — 73030 X-RAY EXAM OF SHOULDER: CPT

## 2023-01-10 PROCEDURE — 72125 CT NECK SPINE W/O DYE: CPT

## 2023-01-10 PROCEDURE — 70450 CT HEAD/BRAIN W/O DYE: CPT

## 2023-01-10 PROCEDURE — 6370000000 HC RX 637 (ALT 250 FOR IP): Performed by: EMERGENCY MEDICINE

## 2023-01-10 PROCEDURE — 99284 EMERGENCY DEPT VISIT MOD MDM: CPT

## 2023-01-10 RX ORDER — ACETAMINOPHEN 500 MG
1000 TABLET ORAL ONCE
Status: COMPLETED | OUTPATIENT
Start: 2023-01-10 | End: 2023-01-10

## 2023-01-10 RX ORDER — IBUPROFEN 600 MG/1
TABLET ORAL
Status: COMPLETED
Start: 2023-01-10 | End: 2023-01-11

## 2023-01-10 RX ORDER — IBUPROFEN 600 MG/1
600 TABLET ORAL ONCE
Status: COMPLETED | OUTPATIENT
Start: 2023-01-11 | End: 2023-01-11

## 2023-01-10 RX ADMIN — ACETAMINOPHEN 1000 MG: 500 TABLET ORAL at 22:10

## 2023-01-10 ASSESSMENT — PAIN SCALES - GENERAL
PAINLEVEL_OUTOF10: 8
PAINLEVEL_OUTOF10: 8

## 2023-01-10 ASSESSMENT — PAIN - FUNCTIONAL ASSESSMENT: PAIN_FUNCTIONAL_ASSESSMENT: 0-10

## 2023-01-10 ASSESSMENT — PAIN DESCRIPTION - LOCATION: LOCATION: SHOULDER

## 2023-01-10 ASSESSMENT — PAIN DESCRIPTION - ORIENTATION: ORIENTATION: LEFT

## 2023-01-11 ENCOUNTER — TELEPHONE (OUTPATIENT)
Dept: ORTHOPEDIC SURGERY | Age: 83
End: 2023-01-11

## 2023-01-11 ENCOUNTER — OFFICE VISIT (OUTPATIENT)
Dept: ORTHOPEDIC SURGERY | Age: 83
End: 2023-01-11

## 2023-01-11 VITALS — HEIGHT: 69 IN | WEIGHT: 189 LBS | BODY MASS INDEX: 27.99 KG/M2

## 2023-01-11 VITALS
RESPIRATION RATE: 16 BRPM | OXYGEN SATURATION: 96 % | SYSTOLIC BLOOD PRESSURE: 142 MMHG | HEART RATE: 72 BPM | DIASTOLIC BLOOD PRESSURE: 75 MMHG | TEMPERATURE: 98.2 F

## 2023-01-11 DIAGNOSIS — R52 PAIN: ICD-10-CM

## 2023-01-11 DIAGNOSIS — S42.292A OTHER CLOSED DISPLACED FRACTURE OF PROXIMAL END OF LEFT HUMERUS, INITIAL ENCOUNTER: Primary | ICD-10-CM

## 2023-01-11 PROCEDURE — 6370000000 HC RX 637 (ALT 250 FOR IP)

## 2023-01-11 RX ADMIN — IBUPROFEN 600 MG: 600 TABLET, FILM COATED ORAL at 00:12

## 2023-01-11 RX ADMIN — IBUPROFEN 600 MG: 600 TABLET ORAL at 00:12

## 2023-01-11 ASSESSMENT — PAIN SCALES - GENERAL: PAINLEVEL_OUTOF10: 6

## 2023-01-11 NOTE — PROGRESS NOTES
ORTHOPAEDIC SURGERY INITIAL EVALUATION NOTE  Chief Complaint   Patient presents with    Shoulder Pain     L SHOULDER PAIN      HISTORY OF PRESENT ILLNESS:  50-year-old right-hand-dominant male presents for evaluation of left shoulder injury. He sustained the injury yesterday. He sustained a mechanical fall. He indicates that he felt like he turned suddenly and lost his balance. He thinks that he bumped his shoulder on a concrete wall while falling. He rates his pain an 8 or 9 out of 10. It has been severe. He has been using gabapentin and Tylenol for pain relief. This is helped tremendously. He denies numbness or tingling. He reports having issues with his contralateral shoulder in the form of rotator cuff pathology. He is a patient of the [facility] and has been managing his right shoulder conservatively with them in the form of therapy and injections. Past Medical History:   Diagnosis Date    Asthma     CAD (coronary artery disease)     HTN (hypertension) 7/12/2019    Hyperlipidemia     Prostate cancer (HCC)        Current Outpatient Medications   Medication Sig Dispense Refill    metoprolol tartrate (LOPRESSOR) 25 MG tablet Take 25 mg by mouth 2 times daily      aspirin 81 MG chewable tablet Take 1 tablet by mouth daily 30 tablet 0    nitroGLYCERIN (NITROSTAT) 0.4 MG SL tablet Place 1 tablet under the tongue every 5 minutes as needed for Chest pain up to max of 3 total doses. If no relief after 1 dose, call 911. 25 tablet 3    atorvastatin (LIPITOR) 80 MG tablet Take 1 tablet by mouth nightly 30 tablet 5    carvedilol (COREG) 12.5 MG tablet Take 12.5 mg by mouth 2 times daily       lisinopril (PRINIVIL;ZESTRIL) 10 MG tablet Take 10 mg by mouth      buPROPion (WELLBUTRIN SR) 150 MG SR tablet Take 150 mg by mouth 2 times daily. venlafaxine (EFFEXOR-XR) 75 MG XR capsule Take 75 mg by mouth daily. latanoprost (XALATAN) 0.005 % ophthalmic solution 1 drop nightly.       metronidazole (METROCREAM) 0.75 % cream Apply  topically daily. Apply topically 2 times daily. No current facility-administered medications for this visit. Past Surgical History:   Procedure Laterality Date    BUNIONECTOMY      EYE SURGERY      Retinal reattachment - bilateral    JOINT REPLACEMENT      Bilateral knees    KNEE SURGERY         Allergies   Allergen Reactions    Pcn [Penicillins]     Percocet [Oxycodone-Acetaminophen]      \"Made me wacko\" Post surgical crying/hysterical    Vicodin [Hydrocodone-Acetaminophen]      \"Made me wacko\" Post surgical crying/hysterical       No family history on file. Social History     Socioeconomic History    Marital status:      Spouse name: Not on file    Number of children: Not on file    Years of education: Not on file    Highest education level: Not on file   Occupational History    Not on file   Tobacco Use    Smoking status: Former    Smokeless tobacco: Never   Substance and Sexual Activity    Alcohol use: Not Currently    Drug use: No    Sexual activity: Yes     Partners: Female   Other Topics Concern    Not on file   Social History Narrative    Not on file     Social Determinants of Health     Financial Resource Strain: Not on file   Food Insecurity: Not on file   Transportation Needs: Not on file   Physical Activity: Not on file   Stress: Not on file   Social Connections: Not on file   Intimate Partner Violence: Not on file   Housing Stability: Not on file     Review of Systems: Please see today's intake form for complete review of systems. PHYSICAL EXAM  Gen: awake, alert, oriented  HEENT: atraumatic, normocephalic  Neck: supple  Resp: equal chest rise bilaterally, no audible wheezes, no accessory muscle use. CV: Lower extremities warm and well perfused. Abd: soft, nontender   Focused examination of the left shoulder  Presents in a sling. No cuts, open wounds, or abrasions to the lateral shoulder. There is minor early bruising to the left shoulder. There is moderate swelling. There is tenderness to palpation diffusely about the shoulder. No skin tenting or blanching. Minor warmth. Shoulder range of motion was not assessed today given known injury. Elbow range of motion is full extension to 140 degrees of flexion within limits of pain. Sensation is intact to light touch in median, radial, ulnar, and axillary distributions. Motor function is intact to AIN, PIN, ulnar motor nerves. There is a strong palpable radial pulse, and brisk capillary refill to the fingers. Compartments are soft and compressible     RADIOGRAPHIC EXAM:  4 views of the left shoulder including AP, Grashey, scapular Y, [place] x-rays demonstrate displaced surgical neck proximal humerus fracture. This is obliquely oriented. There is significant extension at the fracture site. There is moderate comminution. Glenohumeral joint appears well positioned without subluxation or dislocation. There is varus malalignment with tilting of the humeral head. There is apparent subacromial impingement as a result of malpositioning of the humeral head. There is overall diffuse osteopenia. ASSESSEMENT AND PLAN    79 y.o. male with the following orthopaedic surgery problems:    Displaced left proximal humerus fracture, surgical neck. I discussed the treatment options including operative and nonoperative management. I indicated there was significant displacement and this would heal with the deformity if left without reduction and fixation. I offered the potential for an additional nonsurgical management with reconstruction with shoulder arthroplasty down the road if he achieves a poor result. I also offered acute fixation with 2 separate surgical approaches. One would include a standard deltopectoral approach with plate fixation. This would be a more significant wound to heal.  Secondarily, a minimally invasive option in the form of intramedullary nailing could be undertaken.   I discussed risks, benefits, and alternatives to each option. He elects to proceed with a treatment course that includes intramedullary nailing of the left shoulder proximal humerus fracture. Tentatively, this would be Planned for next Thursday, 1/19/2023. This would be performed at [facility].  He will see his primary care physician for a preoperative history and physical in the interim. I described a standard postoperative course. He expressed understanding of this. All questions answered.     Saranya Ferrer MD

## 2023-01-11 NOTE — DISCHARGE INSTRUCTIONS
You have a fracture of your left upper arm. You have been placed in a sling to wear. Alternate Tylenol and Motrin at home for pain. You have been referred to follow-up with orthopedic surgery. Please call their office for an appointment within the next several days.

## 2023-01-11 NOTE — ED PROVIDER NOTES
[address] (Pt states he turned around too fast and tripped, slamming his left shoulder into the wall. Denies LOC, dizziness or lightheadedness.)      HISTORY OF PRESENT ILLNESS  Cyrus Hinds is a 79 y.o. male with a history of, coronary artery disease and hyperlipidemia. He presents after he lost his balance and fell. He was at a meeting at his [employer] and was throwing something in the trash and turned around too quickly and lost his balance. He fell against a concrete wall in his shoulder made the initial impact. He did hit the back of his head on the wall as well. He denies loss of consciousness. No chest pain or shortness of breath. He is having pain in his left shoulder. He is not on any blood thinning medicines. .   No other complaints, modifying factors or associated symptoms. History obtained from the patient. I have reviewed the following from the nursing documentation. Past Medical History:   Diagnosis Date    Asthma     CAD (coronary artery disease)     HTN (hypertension) 7/12/2019    Hyperlipidemia     Prostate cancer [facility])      Past Surgical History:   Procedure Laterality Date    BUNIONECTOMY      EYE SURGERY      Retinal reattachment - bilateral    JOINT REPLACEMENT      Bilateral knees    KNEE SURGERY       History reviewed. No pertinent family history.   Social History     Socioeconomic History    Marital status:      Spouse name: Not on file    Number of children: Not on file    Years of education: Not on file    Highest education level: Not on file   Occupational History    Not on file   Tobacco Use    Smoking status: Former    Smokeless tobacco: Never   Substance and Sexual Activity    Alcohol use: Not Currently    Drug use: No    Sexual activity: Yes     Partners: Female   Other Topics Concern    Not on file   Social History Narrative    Not on file     Social Determinants of Health     Financial Resource Strain: Not on file   Food Insecurity: Not on file   Transportation Needs: Not on file   Physical Activity: Not on file   Stress: Not on file   Social Connections: Not on file   Intimate Partner Violence: Not on file   Housing Stability: Not on file     No current facility-administered medications for this encounter. Current Outpatient Medications   Medication Sig Dispense Refill    metoprolol tartrate (LOPRESSOR) 25 MG tablet Take 25 mg by mouth 2 times daily      aspirin 81 MG chewable tablet Take 1 tablet by mouth daily 30 tablet 0    nitroGLYCERIN (NITROSTAT) 0.4 MG SL tablet Place 1 tablet under the tongue every 5 minutes as needed for Chest pain up to max of 3 total doses. If no relief after 1 dose, call 911. 25 tablet 3    atorvastatin (LIPITOR) 80 MG tablet Take 1 tablet by mouth nightly 30 tablet 5    carvedilol (COREG) 12.5 MG tablet Take 12.5 mg by mouth 2 times daily       lisinopril (PRINIVIL;ZESTRIL) 10 MG tablet Take 10 mg by mouth      buPROPion (WELLBUTRIN SR) 150 MG SR tablet Take 150 mg by mouth 2 times daily. venlafaxine (EFFEXOR-XR) 75 MG XR capsule Take 75 mg by mouth daily. latanoprost (XALATAN) 0.005 % ophthalmic solution 1 drop nightly. metronidazole (METROCREAM) 0.75 % cream Apply  topically daily. Apply topically 2 times daily.        Allergies   Allergen Reactions    Pcn [Penicillins]     Percocet [Oxycodone-Acetaminophen]      \"Made me wacko\" Post surgical crying/hysterical    Vicodin [Hydrocodone-Acetaminophen]      \"Made me wacko\" Post surgical crying/hysterical       REVIEW OF SYSTEMS    Unless otherwise stated in this report, this patient's positive and negative responses for review of systems (constitutional, eyes, ENT, cardiovascular, respiratory, gastrointestinal, neurological, genitourinary, musculoskeletal, integument systems and systems related to the presenting problem) are either stated in the preceding paragraph, were not pertinent or were negative for the symptoms and/or complaints related to the medical problem. PHYSICAL EXAM  BP (!) 158/81   Pulse 74   Temp 98.2 °F (36.8 °C) (Oral)   Resp 18   SpO2 96%   GENERAL APPEARANCE: Awake and alert. Cooperative. No acute distress. HEAD: Normocephalic. Atraumatic. EYES: PERRL. EOM's grossly intact. ENT: Mucous membranes are moist.   NECK: Supple, trachea midline. No C-spine tenderness. HEART: RRR. LUNGS: Respirations unlabored. CTAB. Good air exchange. No wheezes, rales, or rhonchi. Speaking comfortably in full sentences. ABDOMEN:  Soft. Non-distended. Non-tender. No guarding or rebound. EXTREMITIES: No peripheral edema. Tenderness to the left proximal humerus with limited range of motion secondary to pain. Left upper extremity is neurovascularly intact. No pain with range of motion of bilateral hips. SKIN: Warm, dry and intact. No acute rashes. NEUROLOGICAL: Alert and oriented X 3. CN II-XII grossly intact. Strength 5/5, sensation intact. PSYCHIATRIC: Normal mood and affect. LABS  I have reviewed all labs for this visit. No results found for this visit on 01/10/23. RADIOLOGY  X-RAYS: ALL IMAGES INCLUDING PLAIN FILMS, CT, ULTRASOUND AND MRI HAVE BEEN READ BY THE RADIOLOGIST. XR CHEST PORTABLE   Final Result   1. Cardiomegaly, with mild vascular congestion   2. Scattered opacities present bilaterally, and may represent areas of   coalescing edema, atelectasis or early pneumonia. XR SHOULDER LEFT (MIN 2 VIEWS)   Final Result   Comminuted, mildly displaced humeral neck fracture. CT CERVICAL SPINE WO CONTRAST   Final Result   No evidence of an acute fracture or traumatic malalignment involving the   cervical spine         CT HEAD WO CONTRAST   Final Result   No acute intracranial abnormality. I have personally reviewed Xray and Ultrasound images and radiology confirms the interpretation:  X-ray shows proximal left humerus fracture.       Medications administered. (Dose and Route): Motrin 600 mg by mouth, Tylenol 1000 mg by mouth. Sepsis:  Is this patient to be included in the SEP-1 Core Measure due to severe sepsis or septic shock? No   Exclusion criteria - the patient is NOT to be included for SEP-1 Core Measure due to: Infection is not suspected             ED COURSE/MDM:  Patient seen and evaluated. Here the patient is afebrile with normal vitals signs. Old records reviewed: No prior records reviewed. Diagnoses affirmatively addressed, consideration of tests, treatments & admission, including shared decision making:    Here the patient reports a fall after losing his balance. No loss of consciousness. No obvious signs of head trauma or C-spine tenderness but he did hit his head and is elderly. Therefore I did perform head CT and cervical spine CT. They are both normal.  His only pain is in his left shoulder. Left arm is neurovascularly intact. X-ray does show proximal humerus fracture. No chest or abdominal pain no hip pain. I do not think he needs additional imaging. I have placed him in a sling and will refer him to orthopedic surgery for prompt follow-up. Consultation summary: No consultations placed. Social determinants of health: No barriers. Labs and imaging reviewed and results discussed with patient. Patient was reassessed as noted above . Plan of care discussed with patient. Patient in agreement with plan. Strict return precautions have been given. Patient was given scripts for the following medications. I counseled patient how to take these medications. New Prescriptions    No medications on file       No prescriptions given. CLINICAL IMPRESSION  1. Fall, initial encounter    2. Closed fracture of proximal end of left humerus, unspecified fracture morphology, initial encounter        Blood pressure (!) 158/81, pulse 74, temperature 98.2 °F (36.8 °C), temperature source Oral, resp.  rate 18, SpO2 96 %. DISPOSITION  Daisy Coates was discharged to home in stable condition. Dina Anderson MD am the primary clinician of record.     (Please note this note was completed with a voice recognition program.  Efforts were made to edit the dictations but occasionally words are mis-transcribed.)        Fransico Osullivan MD  01/12/23 1400

## 2023-01-11 NOTE — TELEPHONE ENCOUNTER
Appointment ED humerus fx left    Follow up appt scheduled dr Carolyn Curry today 1/11/22. Kenan Hoffmann

## 2023-01-11 NOTE — LETTER
[id number] [employer]   DR. ARAMIS MONTOYA     TODAY'S DATE: 23    PATIENT'S NAME: Kortney Mahajan    PATIENT'S NUMBER: [id number]    : 1940    PREFERRED PHONE NUMBER: [phone]      WORK PHONE NUMBER: There is no work phone number on file.          DIAGNOSIS: LEFT PROXIMAL HUMERUS FRACTURE    DATE OF SURGERY: 23    PROCEDURE: INTRAMEDULLARY NAILING OF LEFT PROXIMAL HUMERUS FRACTURE    SURGEON: Dr. Shmuel Mahmood: Urgent    HOSPITAL: [facility] OR    ADMISSION STATUS: Outpatient/Same Day Surgery    ANESTHESIA: General   Pre-Op Block requested  No    LENGTH OF SURGERY: 2 hours    EQUIPMENT REQUESTED: [facility] and [facility] Proximal humerus nail, Beachchair position, spider arm      X-RAYS REQUIRED: C-ARM      PCP: Harinder Lui MD    H&P TO BE DONE BY THE PCP      CARDIAC CLEARANCE NEEDED: No     ALLERGIES:   Allergies   Allergen Reactions    Pcn [Penicillins]     Percocet [Oxycodone-Acetaminophen]      \"Made me wacko\" Post surgical crying/hysterical    Vicodin [Hydrocodone-Acetaminophen]      \"Made me wacko\" Post surgical crying/hysterical       DME: none    POST-OP VISIT: 14 Days    OTHER INSTRUCTIONS/REMARKS: N/A    INSURANCE INFORMATION: _________________________ CARD IN MEDIA IN EPIC___  CARD FAXED___    PRE-CERTIFICATION REQUIRED: YES   NO   PER _______________________    SURGERY SCHEDULED BY: ____________________________________    PATIENT CALLED AND CONFIRMED DATE & TIME: ______________________

## 2023-01-13 NOTE — PROGRESS NOTES
Kelly Aubree    Age 79 y.o.    male    1940    MRN [id number]    1/19/2023  Arrival Time_____________  OR Time____________145 Min     Procedure(s):  INTRAMEDULLARY NAILING OF LEFT PROXIMAL HUMERUS FRACTURE      MIKE SHELL                      General   Surgeon(s):  Isela Bernabe, MD      DAY ADMIT ___  SDS/OP ___  OUTPT IN BED ___        Phone [phone] (home)     PCP _____________________ Phone_________________ Epic ( ) Epic CE ( ) Appt ________    ADDITIONAL INFO __________________________________ Cardio/Consult _____________    NOTES _____________________________________________________________________    ____________________________________________________________________________    PAT APPT DATE:________ TIME: ________  FAXED QAD: _______  (__) H&P w/ Hospitalist  __________________________________________________________________________  Preop Nurse phone screen complete: _____________  (__) CBC     (__) W/ DIFF ___________     (__) Hgb A1C    ___________  (__) CHEST X RAY   __________  (__) LIPID PROFILE  ___________  (__) EKG   __________  (__) PT/PTT   ___________  (__) PFT's   __________  (__) BMP   ___________  (__) CAROTIDS  __________  (__) CMP   ___________  (__) VEIN MAPPING  __________  (__) U/A   ___________  (__) HISTORY & PHYSICAL __________  (__) URINE C & S  ___________  (__) CARDIAC CLEARANCE __________  (__) U/A W/ FLEX  ___________  (__) PULM.  CLEARANCE __________  (__) SERUM PREGNANCY ___________  (__) Check Epic DOS orders __________  (__) TYPE & SCREEN __________repeat ( ) (__)  __________________ __________  (__) ALBUMIN   ___________  (__)  __________________ __________  (__) TRANSFERRIN  ___________  (__)  __________________ __________  (__) LIVER PROFILE  ___________  (__)  __________________ __________  (__) MRSA NASAL SWAB ___________  (__) URINE PREG DOS __________  (__) SED RATE  ___________  (__) BLOOD SUGAR DOS __________  (__) C-REACTIVE PROTEIN ___________    (__) VITAMIN D HYDROXY ___________  (__) BLOOD THINNERS __________    (__) ACE/ ARBS: _____________________     (__) BETABLOCKERS __________________

## 2023-01-16 ENCOUNTER — TELEPHONE (OUTPATIENT)
Dept: ORTHOPEDIC SURGERY | Age: 83
End: 2023-01-16

## 2023-01-16 RX ORDER — PANTOPRAZOLE SODIUM 40 MG/1
40 TABLET, DELAYED RELEASE ORAL
COMMUNITY

## 2023-01-16 RX ORDER — ESCITALOPRAM OXALATE 20 MG/1
20 TABLET ORAL DAILY
COMMUNITY

## 2023-01-16 NOTE — PROGRESS NOTES
Preoperative Screening for Elective Surgery/Invasive Procedures While COVID-19 present in the community    Have you had any of the following symptoms? Fever, chills  Cough  Shortness of breath  Muscle aches/pain  Diarrhea  Abdominal pain, nausea, vomiting  Loss or decrease in taste and / or smell  Risk of Exposure  Have you recently been hospitalized for COVID-19 or flu-like illness, if so when? Recently diagnosed with COVID-19, if so when? Recently tested for COVID-19, if so when? Have you been in close contact with a person or family member who currently has or recently had COVID-23? If yes, when and in what context? Do you live with anybody who in the last 14 days has had fever, chills, shortness of breath, muscle aches, flu-like illness? Do you have any close contacts or family members who are currently in the hospital for COVID-19 or flu-like illness? If yes, assess recent close contact with this person. Indicate if the patient has a positive screen by answering yes to one or more of the above questions. Patients who test positive or screen positive prior to surgery or on the day of surgery should be evaluated in conjunction with the surgeon/proceduralist/anesthesiologist to determine the urgency of the procedure.     Pt's wife states symptom free and no close covid exposure

## 2023-01-16 NOTE — PROGRESS NOTES
1. Do not eat or drink anything after 12 midnight prior to surgery. This includes no water, chewing gum mints, or ice chips. You may brush your teeth and gargle the day of surgery but DO NOT SWALLOW THE WATER. 2. Please see your family doctor/pediatrician for a history and physical and/or concerning medications. Bring any test results/reports from your physician's office. If you are under the care of a heart doctor or specialist please be aware that you may be asked to see him or her for clearance. 3. You may be asked to stop blood thinners such as Coumadin, Plavix, Fragmin, and Lovenox or Anti-inflammatories such as Aspirin, Ibuprofen, Advil, and Naproxen prior to your surgery. Please check with your doctor before stopping these or any other medications. 4. Do not smoke, and do not drink any alcoholic beverages 24 hours prior to surgery. 5. You MUST make arrangements for a responsible adult to take you home after your surgery. For your safety, you will not be allowed to leave alone or drive yourself home. Your surgery will be cancelled if you do not have a ride home. Also for your safety, it is strongly suggested someone stay with you the first 24 hrs after your surgery. 6. A parent/legal guardian must accompany a child scheduled for surgery and plan to stay at the hospital until the child is discharged. Please do not bring other children with you. 7. For your comfort,please wear simple, loose fitting clothing to the hospital.  Please do not bring valuables (money, credit cards, checkbooks, etc.) Do not wear any makeup (including no eye makeup) or nail polish on your fingers or toes. 8. For your safety, please DO NOT wear any jewelry or piercings on day of surgery. All body piercing jewelry must be removed. 9. If you have dentures, they will be removed before going to the OR; for your convenience we will provide you with a container.   If you wear contact lenses or glasses, they will be removed, they will be removed, please bring a case for them. 10. If appicable,Please see your family doctor/pediatrician for a history & physical and/or concerning medications. Bring any test results/reports from your physician's office. 11. Remember to bring Blood Bank bracelet to the hospital on the day of surgery. 12. If you have a Living Will and Durable Power of  for Healthcare, please bring in a copy. 15. Notify your Surgeon if you develop any illness between now and surgery  time, cough, cold, fever, sore throat, nausea, vomiting, etc.  Please notify your surgeon if you experience dizziness, shortness of breath or blurred vision between now & the time of your surgery   14. DO NOT shave your operative site 96 hours prior to surgery. For face & neck surgery, men may use an electric razor 48 hours prior to surgery. 15. Shower the night before surgery with ___Antibacterial soap ___Hibiclens   16. To provide excellent care visitors will be limited to one in the room at any given time. 17.  Please bring picture ID and insurance card. 18.  Visit our web site for additional information:  [email or web address].

## 2023-01-16 NOTE — TELEPHONE ENCOUNTER
[facility] AND I WAS TOLD PT NEEDS A REFERRAL AND WAS [facility]IV[facility] NUMBER.  UNABLE TO GET THROUGH BY PHONE.  01/16/23 [facility]/CLOS[facility] FOR [facility] DAY.  WILL CALL TOMORROW.

## 2023-01-17 ENCOUNTER — ANESTHESIA EVENT (OUTPATIENT)
Dept: OPERATING ROOM | Age: 83
End: 2023-01-17
Payer: MEDICARE

## 2023-01-18 NOTE — TELEPHONE ENCOUNTER
PT HAS HUMAN [address]. HE CHOOSES TO GO THROUGH FOR HIS SURGERY 01/19/23.   Auth: NPR  Date: 01/19/23  Spoke with: EJ  Type of SX: OUTPATIENT  Location: [facility]  CPT: 62716, 56367   DX: Y11.002A  SX area: L HUMERUS   Insurance: [employer]

## 2023-01-19 ENCOUNTER — ANESTHESIA (OUTPATIENT)
Dept: OPERATING ROOM | Age: 83
End: 2023-01-19
Payer: MEDICARE

## 2023-01-19 ENCOUNTER — HOSPITAL ENCOUNTER (INPATIENT)
Age: 83
LOS: 4 days | Discharge: NURSING, REHAB OR LONG-TERM CARE | DRG: 493 | End: 2023-01-23
Attending: ORTHOPAEDIC SURGERY | Admitting: ORTHOPAEDIC SURGERY
Payer: MEDICARE

## 2023-01-19 ENCOUNTER — APPOINTMENT (OUTPATIENT)
Dept: GENERAL RADIOLOGY | Age: 83
DRG: 493 | End: 2023-01-19
Attending: ORTHOPAEDIC SURGERY
Payer: MEDICARE

## 2023-01-19 DIAGNOSIS — S42.201A CLOSED FRACTURE OF PROXIMAL END OF RIGHT HUMERUS, UNSPECIFIED FRACTURE MORPHOLOGY, INITIAL ENCOUNTER: ICD-10-CM

## 2023-01-19 DIAGNOSIS — G89.18 POST-OPERATIVE PAIN: Primary | ICD-10-CM

## 2023-01-19 LAB
ANION GAP SERPL CALCULATED.3IONS-SCNC: 11 MMOL/L (ref 3–16)
BUN BLDV-MCNC: 33 MG/DL (ref 7–20)
CALCIUM SERPL-MCNC: 9.4 MG/DL (ref 8.3–10.6)
CHLORIDE BLD-SCNC: 102 MMOL/L (ref 99–110)
CO2: 21 MMOL/L (ref 21–32)
CREAT SERPL-MCNC: 1.5 MG/DL (ref 0.8–1.3)
GFR SERPL CREATININE-BSD FRML MDRD: 46 ML/MIN/{1.73_M2}
GLUCOSE BLD-MCNC: 115 MG/DL (ref 70–99)
POTASSIUM SERPL-SCNC: 4.1 MMOL/L (ref 3.5–5.1)
SODIUM BLD-SCNC: 134 MMOL/L (ref 136–145)

## 2023-01-19 PROCEDURE — A4217 STERILE WATER/SALINE, 500 ML: HCPCS | Performed by: ORTHOPAEDIC SURGERY

## 2023-01-19 PROCEDURE — 3600000004 HC SURGERY LEVEL 4 BASE: Performed by: ORTHOPAEDIC SURGERY

## 2023-01-19 PROCEDURE — 3600000014 HC SURGERY LEVEL 4 ADDTL 15MIN: Performed by: ORTHOPAEDIC SURGERY

## 2023-01-19 PROCEDURE — 73060 X-RAY EXAM OF HUMERUS: CPT

## 2023-01-19 PROCEDURE — 23615 OPTX PROX HUMRL FX W/INT FIX: CPT | Performed by: ORTHOPAEDIC SURGERY

## 2023-01-19 PROCEDURE — 94761 N-INVAS EAR/PLS OXIMETRY MLT: CPT

## 2023-01-19 PROCEDURE — 2500000003 HC RX 250 WO HCPCS: Performed by: NURSE ANESTHETIST, CERTIFIED REGISTERED

## 2023-01-19 PROCEDURE — 2580000003 HC RX 258: Performed by: ORTHOPAEDIC SURGERY

## 2023-01-19 PROCEDURE — C1713 ANCHOR/SCREW BN/BN,TIS/BN: HCPCS | Performed by: ORTHOPAEDIC SURGERY

## 2023-01-19 PROCEDURE — 1200000000 HC SEMI PRIVATE

## 2023-01-19 PROCEDURE — 3209999900 FLUORO FOR SURGICAL PROCEDURES

## 2023-01-19 PROCEDURE — 2720000010 HC SURG SUPPLY STERILE: Performed by: ORTHOPAEDIC SURGERY

## 2023-01-19 PROCEDURE — 6370000000 HC RX 637 (ALT 250 FOR IP): Performed by: ORTHOPAEDIC SURGERY

## 2023-01-19 PROCEDURE — 2500000003 HC RX 250 WO HCPCS: Performed by: ORTHOPAEDIC SURGERY

## 2023-01-19 PROCEDURE — 7100000001 HC PACU RECOVERY - ADDTL 15 MIN: Performed by: ORTHOPAEDIC SURGERY

## 2023-01-19 PROCEDURE — 6360000002 HC RX W HCPCS: Performed by: ORTHOPAEDIC SURGERY

## 2023-01-19 PROCEDURE — 6360000002 HC RX W HCPCS: Performed by: NURSE ANESTHETIST, CERTIFIED REGISTERED

## 2023-01-19 PROCEDURE — 80048 BASIC METABOLIC PNL TOTAL CA: CPT

## 2023-01-19 PROCEDURE — 0PSD36Z REPOSITION LEFT HUMERAL HEAD WITH INTRAMEDULLARY INTERNAL FIXATION DEVICE, PERCUTANEOUS APPROACH: ICD-10-PCS | Performed by: ORTHOPAEDIC SURGERY

## 2023-01-19 PROCEDURE — 3700000000 HC ANESTHESIA ATTENDED CARE: Performed by: ORTHOPAEDIC SURGERY

## 2023-01-19 PROCEDURE — 2580000003 HC RX 258: Performed by: NURSE ANESTHETIST, CERTIFIED REGISTERED

## 2023-01-19 PROCEDURE — 3700000001 HC ADD 15 MINUTES (ANESTHESIA): Performed by: ORTHOPAEDIC SURGERY

## 2023-01-19 PROCEDURE — 2700000000 HC OXYGEN THERAPY PER DAY

## 2023-01-19 PROCEDURE — 7100000000 HC PACU RECOVERY - FIRST 15 MIN: Performed by: ORTHOPAEDIC SURGERY

## 2023-01-19 PROCEDURE — 2709999900 HC NON-CHARGEABLE SUPPLY: Performed by: ORTHOPAEDIC SURGERY

## 2023-01-19 DEVICE — 5.0MM X 40MM SELF-TAPPING                                    CANCELLOUS SCREW TITANIUM
Type: IMPLANTABLE DEVICE | Site: SHOULDER | Status: FUNCTIONAL
Brand: TRIGEN

## 2023-01-19 DEVICE — TRIGEN HUMERAL NAIL 8/7MM X 16CM                                    PROXIMAL BENT
Type: IMPLANTABLE DEVICE | Site: SHOULDER | Status: FUNCTIONAL
Brand: TRIGEN

## 2023-01-19 DEVICE — 5.0MM X 44MM SELF-TAPPING                                    CANCELLOUS SCREW TITANIUM
Type: IMPLANTABLE DEVICE | Site: SHOULDER | Status: FUNCTIONAL
Brand: TRIGEN

## 2023-01-19 DEVICE — 5.0MM X 42MM SELF-TAPPING                                    CANCELLOUS SCREW TITANIUM
Type: IMPLANTABLE DEVICE | Site: SHOULDER | Status: FUNCTIONAL
Brand: TRIGEN

## 2023-01-19 DEVICE — TRIGEN 4.0MM X 28MM SELF-TAPPING                                    CORTICAL SCREW TITANIUM
Type: IMPLANTABLE DEVICE | Site: SHOULDER | Status: FUNCTIONAL
Brand: TRIGEN

## 2023-01-19 RX ORDER — OXYCODONE HYDROCHLORIDE 5 MG/1
5 TABLET ORAL PRN
Status: DISCONTINUED | OUTPATIENT
Start: 2023-01-19 | End: 2023-01-19 | Stop reason: HOSPADM

## 2023-01-19 RX ORDER — SODIUM CHLORIDE, SODIUM LACTATE, POTASSIUM CHLORIDE, CALCIUM CHLORIDE 600; 310; 30; 20 MG/100ML; MG/100ML; MG/100ML; MG/100ML
INJECTION, SOLUTION INTRAVENOUS CONTINUOUS PRN
Status: DISCONTINUED | OUTPATIENT
Start: 2023-01-19 | End: 2023-01-19 | Stop reason: SDUPTHER

## 2023-01-19 RX ORDER — ROCURONIUM BROMIDE 10 MG/ML
INJECTION, SOLUTION INTRAVENOUS PRN
Status: DISCONTINUED | OUTPATIENT
Start: 2023-01-19 | End: 2023-01-19 | Stop reason: SDUPTHER

## 2023-01-19 RX ORDER — DEXAMETHASONE SODIUM PHOSPHATE 4 MG/ML
INJECTION, SOLUTION INTRA-ARTICULAR; INTRALESIONAL; INTRAMUSCULAR; INTRAVENOUS; SOFT TISSUE PRN
Status: DISCONTINUED | OUTPATIENT
Start: 2023-01-19 | End: 2023-01-19 | Stop reason: SDUPTHER

## 2023-01-19 RX ORDER — MAGNESIUM HYDROXIDE 1200 MG/15ML
LIQUID ORAL CONTINUOUS PRN
Status: DISCONTINUED | OUTPATIENT
Start: 2023-01-19 | End: 2023-01-19 | Stop reason: HOSPADM

## 2023-01-19 RX ORDER — ONDANSETRON 4 MG/1
4 TABLET, ORALLY DISINTEGRATING ORAL EVERY 8 HOURS PRN
Status: DISCONTINUED | OUTPATIENT
Start: 2023-01-19 | End: 2023-01-23 | Stop reason: HOSPADM

## 2023-01-19 RX ORDER — SODIUM CHLORIDE 9 MG/ML
25 INJECTION, SOLUTION INTRAVENOUS PRN
Status: DISCONTINUED | OUTPATIENT
Start: 2023-01-19 | End: 2023-01-19 | Stop reason: HOSPADM

## 2023-01-19 RX ORDER — PROPOFOL 10 MG/ML
INJECTION, EMULSION INTRAVENOUS PRN
Status: DISCONTINUED | OUTPATIENT
Start: 2023-01-19 | End: 2023-01-19 | Stop reason: SDUPTHER

## 2023-01-19 RX ORDER — DIPHENHYDRAMINE HYDROCHLORIDE 50 MG/ML
6.25 INJECTION INTRAMUSCULAR; INTRAVENOUS
Status: DISCONTINUED | OUTPATIENT
Start: 2023-01-19 | End: 2023-01-19 | Stop reason: HOSPADM

## 2023-01-19 RX ORDER — OXYCODONE HYDROCHLORIDE 5 MG/1
10 TABLET ORAL PRN
Status: DISCONTINUED | OUTPATIENT
Start: 2023-01-19 | End: 2023-01-19 | Stop reason: HOSPADM

## 2023-01-19 RX ORDER — MEPERIDINE HYDROCHLORIDE 50 MG/ML
12.5 INJECTION INTRAMUSCULAR; INTRAVENOUS; SUBCUTANEOUS EVERY 5 MIN PRN
Status: DISCONTINUED | OUTPATIENT
Start: 2023-01-19 | End: 2023-01-19 | Stop reason: HOSPADM

## 2023-01-19 RX ORDER — ONDANSETRON 2 MG/ML
4 INJECTION INTRAMUSCULAR; INTRAVENOUS EVERY 6 HOURS PRN
Status: DISCONTINUED | OUTPATIENT
Start: 2023-01-19 | End: 2023-01-23 | Stop reason: HOSPADM

## 2023-01-19 RX ORDER — MIDAZOLAM HYDROCHLORIDE 1 MG/ML
2 INJECTION INTRAMUSCULAR; INTRAVENOUS
Status: DISCONTINUED | OUTPATIENT
Start: 2023-01-19 | End: 2023-01-19 | Stop reason: HOSPADM

## 2023-01-19 RX ORDER — LIDOCAINE HYDROCHLORIDE 20 MG/ML
INJECTION, SOLUTION INFILTRATION; PERINEURAL PRN
Status: DISCONTINUED | OUTPATIENT
Start: 2023-01-19 | End: 2023-01-19 | Stop reason: SDUPTHER

## 2023-01-19 RX ORDER — TRAMADOL HYDROCHLORIDE 50 MG/1
50 TABLET ORAL EVERY 6 HOURS PRN
Status: DISCONTINUED | OUTPATIENT
Start: 2023-01-19 | End: 2023-01-23 | Stop reason: HOSPADM

## 2023-01-19 RX ORDER — SODIUM CHLORIDE, SODIUM LACTATE, POTASSIUM CHLORIDE, CALCIUM CHLORIDE 600; 310; 30; 20 MG/100ML; MG/100ML; MG/100ML; MG/100ML
INJECTION, SOLUTION INTRAVENOUS CONTINUOUS
Status: DISCONTINUED | OUTPATIENT
Start: 2023-01-19 | End: 2023-01-19 | Stop reason: HOSPADM

## 2023-01-19 RX ORDER — EPHEDRINE SULFATE 50 MG/ML
INJECTION INTRAVENOUS PRN
Status: DISCONTINUED | OUTPATIENT
Start: 2023-01-19 | End: 2023-01-19 | Stop reason: SDUPTHER

## 2023-01-19 RX ORDER — LIDOCAINE HYDROCHLORIDE 10 MG/ML
1 INJECTION, SOLUTION EPIDURAL; INFILTRATION; INTRACAUDAL; PERINEURAL
Status: DISCONTINUED | OUTPATIENT
Start: 2023-01-19 | End: 2023-01-19 | Stop reason: HOSPADM

## 2023-01-19 RX ORDER — BUPROPION HYDROCHLORIDE 100 MG/1
100 TABLET ORAL DAILY
COMMUNITY

## 2023-01-19 RX ORDER — SODIUM CHLORIDE 0.9 % (FLUSH) 0.9 %
5-40 SYRINGE (ML) INJECTION PRN
Status: DISCONTINUED | OUTPATIENT
Start: 2023-01-19 | End: 2023-01-19 | Stop reason: HOSPADM

## 2023-01-19 RX ORDER — ONDANSETRON 2 MG/ML
INJECTION INTRAMUSCULAR; INTRAVENOUS PRN
Status: DISCONTINUED | OUTPATIENT
Start: 2023-01-19 | End: 2023-01-19 | Stop reason: SDUPTHER

## 2023-01-19 RX ORDER — BUPIVACAINE HYDROCHLORIDE 5 MG/ML
INJECTION, SOLUTION EPIDURAL; INTRACAUDAL PRN
Status: DISCONTINUED | OUTPATIENT
Start: 2023-01-19 | End: 2023-01-19 | Stop reason: HOSPADM

## 2023-01-19 RX ORDER — FENTANYL CITRATE 50 UG/ML
INJECTION, SOLUTION INTRAMUSCULAR; INTRAVENOUS PRN
Status: DISCONTINUED | OUTPATIENT
Start: 2023-01-19 | End: 2023-01-19 | Stop reason: SDUPTHER

## 2023-01-19 RX ORDER — SODIUM CHLORIDE 9 MG/ML
INJECTION, SOLUTION INTRAVENOUS PRN
Status: DISCONTINUED | OUTPATIENT
Start: 2023-01-19 | End: 2023-01-19 | Stop reason: HOSPADM

## 2023-01-19 RX ORDER — SODIUM CHLORIDE 0.9 % (FLUSH) 0.9 %
5-40 SYRINGE (ML) INJECTION PRN
Status: DISCONTINUED | OUTPATIENT
Start: 2023-01-19 | End: 2023-01-23 | Stop reason: HOSPADM

## 2023-01-19 RX ORDER — SODIUM CHLORIDE 0.9 % (FLUSH) 0.9 %
5-40 SYRINGE (ML) INJECTION EVERY 12 HOURS SCHEDULED
Status: DISCONTINUED | OUTPATIENT
Start: 2023-01-19 | End: 2023-01-19 | Stop reason: HOSPADM

## 2023-01-19 RX ORDER — ONDANSETRON 2 MG/ML
4 INJECTION INTRAMUSCULAR; INTRAVENOUS EVERY 30 MIN PRN
Status: DISCONTINUED | OUTPATIENT
Start: 2023-01-19 | End: 2023-01-19 | Stop reason: HOSPADM

## 2023-01-19 RX ORDER — SODIUM CHLORIDE 0.9 % (FLUSH) 0.9 %
5-40 SYRINGE (ML) INJECTION EVERY 12 HOURS SCHEDULED
Status: DISCONTINUED | OUTPATIENT
Start: 2023-01-19 | End: 2023-01-23 | Stop reason: HOSPADM

## 2023-01-19 RX ORDER — SODIUM CHLORIDE 9 MG/ML
INJECTION, SOLUTION INTRAVENOUS PRN
Status: DISCONTINUED | OUTPATIENT
Start: 2023-01-19 | End: 2023-01-23 | Stop reason: HOSPADM

## 2023-01-19 RX ADMIN — LIDOCAINE HYDROCHLORIDE 80 MG: 20 INJECTION, SOLUTION INFILTRATION; PERINEURAL at 12:56

## 2023-01-19 RX ADMIN — SUGAMMADEX 200 MG: 100 INJECTION, SOLUTION INTRAVENOUS at 14:32

## 2023-01-19 RX ADMIN — EPHEDRINE SULFATE 5 MG: 50 INJECTION INTRAVENOUS at 13:31

## 2023-01-19 RX ADMIN — ONDANSETRON 4 MG: 2 INJECTION INTRAMUSCULAR; INTRAVENOUS at 12:56

## 2023-01-19 RX ADMIN — ROCURONIUM BROMIDE 50 MG: 10 SOLUTION INTRAVENOUS at 12:56

## 2023-01-19 RX ADMIN — CEFAZOLIN 2000 MG: 10 INJECTION, POWDER, FOR SOLUTION INTRAVENOUS at 22:18

## 2023-01-19 RX ADMIN — FENTANYL CITRATE 50 MCG: 50 INJECTION INTRAMUSCULAR; INTRAVENOUS at 12:56

## 2023-01-19 RX ADMIN — SODIUM CHLORIDE, SODIUM LACTATE, POTASSIUM CHLORIDE, AND CALCIUM CHLORIDE: .6; .31; .03; .02 INJECTION, SOLUTION INTRAVENOUS at 12:56

## 2023-01-19 RX ADMIN — FENTANYL CITRATE 50 MCG: 50 INJECTION INTRAMUSCULAR; INTRAVENOUS at 14:15

## 2023-01-19 RX ADMIN — CEFAZOLIN 2 G: 10 INJECTION, POWDER, FOR SOLUTION INTRAVENOUS at 12:56

## 2023-01-19 RX ADMIN — EPHEDRINE SULFATE 5 MG: 50 INJECTION INTRAVENOUS at 13:12

## 2023-01-19 RX ADMIN — DEXAMETHASONE SODIUM PHOSPHATE 8 MG: 4 INJECTION, SOLUTION INTRAMUSCULAR; INTRAVENOUS at 12:56

## 2023-01-19 RX ADMIN — FENTANYL CITRATE 50 MCG: 50 INJECTION INTRAMUSCULAR; INTRAVENOUS at 14:32

## 2023-01-19 RX ADMIN — EPHEDRINE SULFATE 10 MG: 50 INJECTION INTRAVENOUS at 13:40

## 2023-01-19 RX ADMIN — TRAMADOL HYDROCHLORIDE 50 MG: 50 TABLET ORAL at 22:19

## 2023-01-19 RX ADMIN — PROPOFOL 150 MG: 10 INJECTION, EMULSION INTRAVENOUS at 12:56

## 2023-01-19 RX ADMIN — FENTANYL CITRATE 50 MCG: 50 INJECTION INTRAMUSCULAR; INTRAVENOUS at 13:23

## 2023-01-19 ASSESSMENT — PAIN DESCRIPTION - ORIENTATION
ORIENTATION: LEFT
ORIENTATION: LEFT

## 2023-01-19 ASSESSMENT — PAIN SCALES - GENERAL
PAINLEVEL_OUTOF10: 5
PAINLEVEL_OUTOF10: 2
PAINLEVEL_OUTOF10: 7
PAINLEVEL_OUTOF10: 6

## 2023-01-19 ASSESSMENT — PAIN - FUNCTIONAL ASSESSMENT
PAIN_FUNCTIONAL_ASSESSMENT: ACTIVITIES ARE NOT PREVENTED
PAIN_FUNCTIONAL_ASSESSMENT: 0-10
PAIN_FUNCTIONAL_ASSESSMENT: PREVENTS OR INTERFERES SOME ACTIVE ACTIVITIES AND ADLS

## 2023-01-19 ASSESSMENT — PAIN DESCRIPTION - LOCATION
LOCATION: SHOULDER
LOCATION: SHOULDER

## 2023-01-19 ASSESSMENT — PAIN DESCRIPTION - DESCRIPTORS
DESCRIPTORS: ACHING

## 2023-01-19 ASSESSMENT — PAIN DESCRIPTION - PAIN TYPE
TYPE: SURGICAL PAIN
TYPE: SURGICAL PAIN
TYPE: ACUTE PAIN;SURGICAL PAIN

## 2023-01-19 ASSESSMENT — PAIN DESCRIPTION - FREQUENCY
FREQUENCY: INTERMITTENT
FREQUENCY: INTERMITTENT

## 2023-01-19 ASSESSMENT — PAIN DESCRIPTION - ONSET
ONSET: ON-GOING
ONSET: ON-GOING

## 2023-01-19 NOTE — INTERVAL H&P NOTE
Update History & Physical    The patient's History and Physical of January 18, 2023 was reviewed with the patient and I examined the patient. There was no change. The surgical site was confirmed by the patient and me. Plan: The risks, benefits, expected outcome, and alternative to the recommended procedure have been discussed with the patient. Patient understands and wants to proceed with the procedure.      Electronically signed by Ayesha Rm MD on 1/19/2023 at 10:26 AM

## 2023-01-19 NOTE — OP NOTE
Operative Note      Patient: Estelle Rich  YOB: 1940  MRN: [id number]    Date of Procedure: 1/19/2023    Pre-Op Diagnosis: LEFT PROXIMAL HUMERUS FRACTURE    Post-Op Diagnosis: Same       Procedure(s):  INTRAMEDULLARY NAILING OF LEFT PROXIMAL HUMERUS FRACTURE    Surgeon(s):  Kourtney Pat MD    Assistant:   Surgical Assistant: Cinthya Allen; Jose C Gregory    Anesthesia: General    Estimated Blood Loss (mL): less than 974     Complications: None    Specimens:   * No specimens in log *    Implants:  Implant Name Type Inv.  Item Serial No.  Lot No. LRB No. Used Action   NAIL IM L16CM DIA7-8MM LNG PROX HUM LCK AG BEND TRIGEN - [id number] Shoulder/Arm/Wrist/Hand NAIL IM L16CM DIA7-8MM LNG PROX HUM LCK AG BEND TRIGEN [id number] [facility] [employer]- [id number] Left 1 Implanted   SCREW BNE L42MM DIA5MM CANC TI ST Bobbetta Hane TRIGEN - [id number] Shoulder/Arm/Wrist/Hand SCREW BNE L42MM DIA5MM CANC TI ST SANTIAGO KEVIN FULL THRD Drema Perks [id number] [facility] [employer]- [id number] Left 1 Implanted   SCREW BNE L44MM DIA5MM CANC TI ST Bobbetta Hane TRIGEN - [id number] Shoulder/Arm/Wrist/Hand SCREW BNE L44MM DIA5MM CANC TI ST SANTIAGO KEVIN FULL THRD Drema Perks [id number] [facility] [employer]- [id number] Left 1 Implanted   SCREW BNE L40MM DIA5MM CANC TI ST Bobbetta Hane TRIGEN - [id number] Shoulder/Arm/Wrist/Hand SCREW BNE L40MM DIA5MM CANC TI ST SANTIAGO KEVIN FULL THRD TRIGEN [id number] [facility]- [id number] Left 1 Implanted   SCREW BNE L28MM DIA4MM RAFA TI ST KEVIN FULL THRD TRIGEN - [id number] Shoulder/Arm/Wrist/Hand SCREW BNE L28MM DIA4MM RAFA TI ST KEVIN FULL THRD TRIGEN [id number] [facility] [employer]-WD [id number] Left 1 Implanted   SCREW BNE L28MM DIA4MM RAFA TI ST KEVIN FULL THRD TRIGEN - [id number] Shoulder/Arm/Wrist/Hand SCREW BNE L28MM DIA4MM RAFA TI ST KEVIN FULL THRD Esteban Avila [id number] [facility] [employer]-WD [id number] Left 1 Implanted         Drains: Urinary Catheter 01/19/23 Other (comment) (Active)     Findings: displaced proximal humerus fracture with varus malalignment. Hematoma present. Detailed Description of Procedure: The patient was positively identified in the preoperative holding area by attending surgeon. Informed consent was verified and placed on the chart. The left upper extremity was marked appropriately. The patient was seen in the operating room by the anesthesia team.  He was given 2 g IV Ancef for antibiotic prophylaxis. Next, general anesthesia was induced. The patient was intubated. He was transitioned to the beachchair position with all bony prominences well-padded. The left upper extremities was secured with a spider arm. Next, the left arm was examined. There was gross instability with crepitus with movement of the arm. There is hematoma noted to the anterior and lateral.  There is extensive bruising/ecchymosis about the upper extremity. At this point, the arm was prepped and draped in a standard sterile fashion. A timeout was held to verify the correct patient, operative site, laterality, and procedure. Everyone in the room was in agreement. Next, a 4 cm incision was made extending off the anterolateral margin of the acromion. Dissection was carried sharply through the skin. Blunt dissection was carried out to the deltoid fascia. Electrocautery was used to maintain hemostasis. Next, the anterior raphae of the deltoid was identified. The deltoid was split in line with this. This was a relatively bloodless plane. Next, a retractor was placed. The subacromial space was developed bluntly. At this point, the humeral head was identified. An 0 Vicryl suture was used to tag the supraspinatus at the junction of the tendon insertion. This was used to rotate the humeral head out of significant varus and pull it laterally to expose the humeral head starting point for intramedullary nail fixation.   A starting pin was placed as medial as possible in an oblique trajectory into the humeral head. This was then used as a joystick to move the humeral head out of varus as well. A second guidepin was placed medial to the first with an appropriate trajectory parallel to the lateral cortex of the humeral head/greater tuberosity. Next, the guidepin was advanced. This was imaged on AP and Grashey and scapular Y imaging with C arm. The rotator cuff was split and protected during opening reamer use. Being satisfied with this, the opening reamer was utilized. The ball-tipped guidewire was placed down the intramedullary canal. Next, the short proximal humeral nail was advanced over the guidewire. This was able to be seated by hand. At this point, the outrigger guide was used to sequentially drilled, measured, and inserted screws into the proximal humerus. Overall the reduction appeared appropriate. Rotational alignment was appropriate. Next, the outrigger guide was also used to interlock the nail distally with 2 bicortical screws. The outrigger guide was removed. Final C arm images were obtained demonstrating reasonable reduction and hardware placement without evidence of significant hardware prominence. Next, the wounds were copiously irrigated with sterile saline solution. The wound was closed in a layered fashion. 0 Vicryl was used to reapproximate the split in the rotator cuff.  0 Vicryl was used for the deltoid fascia. 2-0 Vicryl and skin staples were used for the skin. The wound was anesthetized with Marcaine half percent for postoperative pain control. Sterile dressings were applied. Patient was then awakened from general anesthesia and transitioned back to the hospital bed. He was taken to the postoperative recovery area in apparent stable condition. There were no ED complications. All sponge and needle counts are correct. Postoperative plan:   Nonweightbearing left upper extremity  May use left arm for light tasks of daily living including eating  Pain control  Ancef IV x24 hours postop  DVT prophylaxis: Ambulation, SCDs  Wound care: Reinforce dressings if necessary  Sling: May remove sling if resting in bed for comfort. Elbow range of motion is encouraged. May remove sling for shower. Recommend sleeping semiupright in recliner or elevated head of bed in order to rest arm in sling. Anticipate need for rehab facility.     Electronically signed by Ayesha Rm MD on 1/19/2023 at 2:54 PM

## 2023-01-19 NOTE — PROGRESS NOTES
Teaching / education initiated regarding perioperative experience, expectations, and pain management during stay. Patient verbalized understanding. Patient has a hernandez in from previous surgery. His wife said he couldn't void after a surgery about 30 days ago so they sent him home with hernandez and was suppose to go in for voiding trail to remove but couldn't make appointment due to his fall and broken humerus. Was hoping to remove hernandez while here at hospital. Patient will be admitted, wife is needing rehab/nursing home for recovery.

## 2023-01-19 NOTE — PROGRESS NOTES
D: Patient here from Or via bed, taken to bay 5 in PACU, all current drips, treatments, skin issues, and plan of care were reviewed by both RN's, patient transferred in stable condition, patient is awake and alert x 4, states pain is tolerable at present. A: Assessment completed and documented, discussed plan of care with patient who agreed.

## 2023-01-19 NOTE — ANESTHESIA POSTPROCEDURE EVALUATION
Department of Anesthesiology  Postprocedure Note    Patient: Des Sim  MRN: [id number]  YOB: 1940  Date of evaluation: 1/19/2023      Procedure Summary     Date: 01/19/23 Room / Location: [address] [place] [address] [address] / [place]    Anesthesia Start: 4526 Anesthesia Stop: 2182    Procedure: INTRAMEDULLARY NAILING OF LEFT PROXIMAL HUMERUS FRACTURE      MCKEON & NEPHEW (Left: Arm Upper) Diagnosis:       Closed fracture of head of left humerus, initial encounter      (LEFT PROXIMAL HUMERUS FRACTURE)    Surgeons: Michelle Escobar MD Responsible Provider: Sandford Litten, MD    Anesthesia Type: general ASA Status: 3          Anesthesia Type: No value filed.     Daly Phase I: Daly Score: 9    Daly Phase II:        Anesthesia Post Evaluation    Comments: Postoperative Anesthesia Note    Name:    Des Sim  MRN:      [id number]    Patient Vitals in the past 12 hrs:  01/19/23 1550, BP:137/78, Pulse:97, Resp:21, SpO2:94 %  01/19/23 1545, BP:(!) 151/86, Pulse:95, Resp:15, SpO2:95 %  01/19/23 1530, BP:(!) 163/97, Pulse:95, Resp:17, SpO2:91 %  01/19/23 1525, Pulse:96, Resp:14, SpO2:99 %  01/19/23 1520, Pulse:99, Resp:15, SpO2:96 %  01/19/23 1515, BP:(!) 158/86, Pulse:100, Resp:13, SpO2:97 %  01/19/23 1510, Pulse:(!) 104, Resp:20, SpO2:(!) 77 %  01/19/23 1505, BP:(!) 158/75, Pulse:(!) 106, Resp:16, SpO2:93 %  01/19/23 1500, BP:(!) 146/86, Pulse:(!) 108, Resp:15, SpO2:93 %  01/19/23 1455, BP:138/87, Pulse:(!) 109, Resp:(!) 7, SpO2:92 %  01/19/23 1452, BP:138/87, Temp:96.8 °F (36 °C), Temp src:Temporal, Pulse:(!) 110, Resp:18  01/19/23 1012, BP:130/76, Temp:97.3 °F (36.3 °C), Temp src:Temporal, Pulse:81, Resp:18, SpO2:94 %     LABS:    CBC  Lab Results       Component                Value               Date/Time                  WBC                      7.0                 07/12/2019 03:02 AM        HGB                      12.4 (L)            07/12/2019 03:02 AM        HCT                      35.9 (L) 07/12/2019 03:02 AM        PLT                      238                 07/12/2019 03:02 AM   RENAL  Lab Results       Component                Value               Date/Time                  NA                       134 (L)             01/19/2023 11:04 AM        K                        4.1                 01/19/2023 11:04 AM        K                        4.0                 07/12/2019 09:16 AM        CL                       102                 01/19/2023 11:04 AM        CO2                      21                  01/19/2023 11:04 AM        BUN                      33 (H)              01/19/2023 11:04 AM        CREATININE               1.5 (H)             01/19/2023 11:04 AM        GLUCOSE                  115 (H)             01/19/2023 11:04 AM   COAGS  Lab Results       Component                Value               Date/Time                  PROTIME                  11.6                03/18/2013 07:13 AM        INR                      1.02                03/18/2013 07:13 AM     Intake & Output: In: 800 (I.V.:800)  Out: 25     Nausea & Vomiting:  No    Level of Consciousness:  Awake    Pain Assessment:  Adequate analgesia    Anesthesia Complications:  No apparent anesthetic complications    SUMMARY      Vital signs stable  OK to discharge from Stage I post anesthesia care.   Care transferred from Anesthesiology department on discharge from perioperative area

## 2023-01-19 NOTE — ANESTHESIA PRE PROCEDURE
Department of Anesthesiology  Preprocedure Note       Name:  Rachana Bravo   Age:  79 y.o.  :  1940                                          MRN:  [id number]         Date:  2023      Surgeon: Rory Nunes):  Ailyn Prince MD    Procedure: Procedure(s):  INTRAMEDULLARY NAILING OF LEFT PROXIMAL HUMERUS FRACTURE      [facility]    Medications prior to admission:   Prior to Admission medications    Medication Sig Start Date End Date Taking? Authorizing Provider   pantoprazole (PROTONIX) 40 MG tablet Take 40 mg by mouth every morning (before breakfast)    Historical Provider, MD   escitalopram (LEXAPRO) 20 MG tablet Take 20 mg by mouth daily    Historical Provider, MD   metoprolol tartrate (LOPRESSOR) 25 MG tablet Take 25 mg by mouth 2 times daily  Patient not taking: Reported on 2023    Historical Provider, MD   aspirin 81 MG chewable tablet Take 1 tablet by mouth daily 19   Trell Vallejo MD   nitroGLYCERIN (NITROSTAT) 0.4 MG SL tablet Place 1 tablet under the tongue every 5 minutes as needed for Chest pain up to max of 3 total doses. If no relief after 1 dose, call 911. 19   DYLAN Figueredo CNP   atorvastatin (LIPITOR) 80 MG tablet Take 1 tablet by mouth nightly 19   DYLAN Figueredo CNP   carvedilol (COREG) 12.5 MG tablet Take 12.5 mg by mouth 2 times daily     Historical Provider, MD   lisinopril (PRINIVIL;ZESTRIL) 10 MG tablet Take 10 mg by mouth    Historical Provider, MD   buPROPion (WELLBUTRIN SR) 150 MG SR tablet Take 150 mg by mouth 2 times daily. Historical Provider, MD   venlafaxine (EFFEXOR-XR) 75 MG XR capsule Take 75 mg by mouth daily. Historical Provider, MD   latanoprost (XALATAN) 0.005 % ophthalmic solution Place 1 drop into both eyes nightly    Historical Provider, MD   metronidazole (METROCREAM) 0.75 % cream Apply  topically daily. Apply topically 2 times daily.     Historical Provider, MD       Current medications:    Current Facility-Administered Medications   Medication Dose Route Frequency Provider Last Rate Last Admin    ceFAZolin (ANCEF) 2000 mg in dextrose 5 % 100 mL IVPB  2,000 mg IntraVENous On Call to 66 Tony Banegas MD        lidocaine PF 1 % injection 1 mL  1 mL IntraDERmal Once PRN Rufino Chung MD        lactated ringers infusion   IntraVENous Continuous Rufino Chung MD        sodium chloride flush 0.9 % injection 5-40 mL  5-40 mL IntraVENous 2 times per day Rufino Chung MD        sodium chloride flush 0.9 % injection 5-40 mL  5-40 mL IntraVENous PRN Rufino Chung MD        0.9 % sodium chloride infusion   IntraVENous PRN Rufino Chung MD           Allergies:     Allergies   Allergen Reactions    Pcn [Penicillins] Anaphylaxis    Percocet [Oxycodone-Acetaminophen]      \"Made me wacko\" Post surgical crying/hysterical    Vicodin [Hydrocodone-Acetaminophen]      \"Made me wacko\" Post surgical crying/hysterical       Problem List:    Patient Active Problem List   Diagnosis Code    Chest pain R07.9    HTN (hypertension) I10    CAD (coronary artery disease) I25.10    Headache R51.9       Past Medical History:        Diagnosis Date    Asthma     CAD (coronary artery disease)     HTN (hypertension) 7/12/2019    Hyperlipidemia     Prostate cancer ([facility] [address]        Past Surgical History:        Procedure Laterality Date    BUNIONECTOMY      CARDIAC SURGERY      coronary stents x2    EYE SURGERY      Retinal reattachment - bilateral    JOINT REPLACEMENT      Bilateral knees    KNEE SURGERY         Social History:    Social History     Tobacco Use    Smoking status: Former    Smokeless tobacco: Never   Substance Use Topics    Alcohol use: Not Currently     Comment: Recovering alcoholic; none for 45 years                                Counseling given: Not Answered      Vital Signs (Current):   Vitals:    01/16/23 1556 01/19/23 1012   BP:  130/76   Pulse:  81   Resp:  18   Temp: 97.3 °F (36.3 °C)   TempSrc:  Temporal   SpO2:  94%   Weight: 170 lb (77.1 kg)    Height: 5' 9\" (1.753 m)                                               BP Readings from Last 3 Encounters:   01/19/23 130/76   01/11/23 (!) 142/75   08/22/19 130/66       NPO Status: Time of last liquid consumption: 2000                        Time of last solid consumption: 2000                        Date of last liquid consumption: 01/18/23                        Date of last solid food consumption: 01/18/23    BMI:   Wt Readings from Last 3 Encounters:   01/16/23 170 lb (77.1 kg)   01/11/23 189 lb (85.7 kg)   08/22/19 189 lb (85.7 kg)     Body mass index is 25.1 kg/m². CBC:   Lab Results   Component Value Date/Time    WBC 7.0 07/12/2019 03:02 AM    RBC 3.58 07/12/2019 03:02 AM    HGB 12.4 07/12/2019 03:02 AM    HCT 35.9 07/12/2019 03:02 AM    .3 07/12/2019 03:02 AM    RDW 13.6 07/12/2019 03:02 AM     07/12/2019 03:02 AM       CMP:   Lab Results   Component Value Date/Time     01/19/2023 11:04 AM    K 4.1 01/19/2023 11:04 AM    K 4.0 07/12/2019 09:16 AM     01/19/2023 11:04 AM    CO2 21 01/19/2023 11:04 AM    BUN 33 01/19/2023 11:04 AM    CREATININE 1.5 01/19/2023 11:04 AM    GFRAA >60 07/12/2019 09:16 AM    GFRAA 51 03/18/2013 07:13 AM    AGRATIO 1.2 07/11/2019 04:30 PM    LABGLOM 46 01/19/2023 11:04 AM    GLUCOSE 115 01/19/2023 11:04 AM    PROT 6.5 07/11/2019 04:30 PM    PROT 6.8 03/18/2013 07:13 AM    CALCIUM 9.4 01/19/2023 11:04 AM    BILITOT 0.3 07/11/2019 04:30 PM    ALKPHOS 50 07/11/2019 04:30 PM    AST 14 07/11/2019 04:30 PM    ALT 11 07/11/2019 04:30 PM       POC Tests: No results for input(s): POCGLU, POCNA, POCK, POCCL, POCBUN, POCHEMO, POCHCT in the last 72 hours.     Coags:   Lab Results   Component Value Date/Time    PROTIME 11.6 03/18/2013 07:13 AM    INR 1.02 03/18/2013 07:13 AM       HCG (If Applicable): No results found for: PREGTESTUR, PREGSERUM, HCG, HCGQUANT     ABGs:   Lab Results Component Value Date/Time    PHART 7.283 09/02/2010 05:20 PM    PO2ART 91.0 09/02/2010 05:20 PM    CMV9ADR 26.4 09/02/2010 05:20 PM    TAZ4KTN 12.2 09/02/2010 05:20 PM    BEART -12.9 09/02/2010 05:20 PM    J1BMUBHB 96.2 09/02/2010 05:20 PM        Type & Screen (If Applicable):  No results found for: LABABO, LABRH    Drug/Infectious Status (If Applicable):  No results found for: HIV, HEPCAB    COVID-19 Screening (If Applicable): No results found for: COVID19        Anesthesia Evaluation  Patient summary reviewed and Nursing notes reviewed no history of anesthetic complications:   Airway: Mallampati: II     Neck ROM: full     Dental:          Pulmonary:   (+) asthma:                            Cardiovascular:    (+) hypertension:, CAD:,                   Neuro/Psych:   (+) headaches:,             GI/Hepatic/Renal:             Endo/Other:                     Abdominal:             Vascular: Other Findings:           Anesthesia Plan      general     ASA 3     (Medications & allergies reviewed  All available lab & EKG data reviewed)  Induction: intravenous. Anesthetic plan and risks discussed with patient. Plan discussed with CRNA.                     Cally Angel MD   1/19/2023

## 2023-01-20 PROBLEM — R00.0 TACHYCARDIA: Status: ACTIVE | Noted: 2023-01-20

## 2023-01-20 PROBLEM — I21.4 NSTEMI (NON-ST ELEVATED MYOCARDIAL INFARCTION) (HCC): Status: ACTIVE | Noted: 2023-01-20

## 2023-01-20 LAB
ABO/RH: NORMAL
AMORPHOUS: NORMAL /HPF
ANION GAP SERPL CALCULATED.3IONS-SCNC: 10 MMOL/L (ref 3–16)
ANTI-XA UNFRAC HEPARIN: 0.32 IU/ML (ref 0.3–0.7)
ANTI-XA UNFRAC HEPARIN: 0.56 IU/ML (ref 0.3–0.7)
ANTI-XA UNFRAC HEPARIN: <0.1 IU/ML (ref 0.3–0.7)
ANTIBODY SCREEN: NORMAL
APTT: 27.3 SEC (ref 23–34.3)
BILIRUBIN URINE: NEGATIVE
BLOOD BANK DISPENSE STATUS: NORMAL
BLOOD BANK PRODUCT CODE: NORMAL
BLOOD, URINE: NEGATIVE
BPU ID: NORMAL
BUN BLDV-MCNC: 33 MG/DL (ref 7–20)
CALCIUM SERPL-MCNC: 9.1 MG/DL (ref 8.3–10.6)
CHLORIDE BLD-SCNC: 102 MMOL/L (ref 99–110)
CLARITY: CLEAR
CO2: 20 MMOL/L (ref 21–32)
COLOR: YELLOW
CREAT SERPL-MCNC: 1.6 MG/DL (ref 0.8–1.3)
DESCRIPTION BLOOD BANK: NORMAL
EKG ATRIAL RATE: 100 BPM
EKG DIAGNOSIS: NORMAL
EKG P AXIS: 57 DEGREES
EKG P-R INTERVAL: 206 MS
EKG Q-T INTERVAL: 346 MS
EKG QRS DURATION: 106 MS
EKG QTC CALCULATION (BAZETT): 446 MS
EKG R AXIS: 25 DEGREES
EKG T AXIS: 67 DEGREES
EKG VENTRICULAR RATE: 100 BPM
GFR SERPL CREATININE-BSD FRML MDRD: 42 ML/MIN/{1.73_M2}
GLUCOSE BLD-MCNC: 129 MG/DL (ref 70–99)
GLUCOSE URINE: NEGATIVE MG/DL
HCT VFR BLD CALC: 22.5 % (ref 40.5–52.5)
HCT VFR BLD CALC: 24.2 % (ref 40.5–52.5)
HEMOGLOBIN: 7.4 G/DL (ref 13.5–17.5)
HEMOGLOBIN: 7.7 G/DL (ref 13.5–17.5)
KETONES, URINE: NEGATIVE MG/DL
LEUKOCYTE ESTERASE, URINE: ABNORMAL
MCH RBC QN AUTO: 32.7 PG (ref 26–34)
MCHC RBC AUTO-ENTMCNC: 32.8 G/DL (ref 31–36)
MCV RBC AUTO: 99.6 FL (ref 80–100)
MICROSCOPIC EXAMINATION: YES
NITRITE, URINE: NEGATIVE
PDW BLD-RTO: 20.2 % (ref 12.4–15.4)
PH UA: 7 (ref 5–8)
PLATELET # BLD: 358 K/UL (ref 135–450)
PMV BLD AUTO: 6.8 FL (ref 5–10.5)
POTASSIUM SERPL-SCNC: 4.5 MMOL/L (ref 3.5–5.1)
PROTEIN UA: 30 MG/DL
RBC # BLD: 2.26 M/UL (ref 4.2–5.9)
RBC UA: NORMAL /HPF (ref 0–4)
SODIUM BLD-SCNC: 132 MMOL/L (ref 136–145)
SPECIFIC GRAVITY UA: 1.01 (ref 1–1.03)
TROPONIN: 0.13 NG/ML
TROPONIN: 0.22 NG/ML
TROPONIN: 0.28 NG/ML
TROPONIN: 0.29 NG/ML
URINE REFLEX TO CULTURE: ABNORMAL
URINE TYPE: ABNORMAL
UROBILINOGEN, URINE: 0.2 E.U./DL
WBC # BLD: 9.7 K/UL (ref 4–11)
WBC UA: NORMAL /HPF (ref 0–5)

## 2023-01-20 PROCEDURE — 94761 N-INVAS EAR/PLS OXIMETRY MLT: CPT

## 2023-01-20 PROCEDURE — 2060000000 HC ICU INTERMEDIATE R&B

## 2023-01-20 PROCEDURE — 6360000002 HC RX W HCPCS: Performed by: INTERNAL MEDICINE

## 2023-01-20 PROCEDURE — 85018 HEMOGLOBIN: CPT

## 2023-01-20 PROCEDURE — 6370000000 HC RX 637 (ALT 250 FOR IP): Performed by: NURSE PRACTITIONER

## 2023-01-20 PROCEDURE — 36430 TRANSFUSION BLD/BLD COMPNT: CPT

## 2023-01-20 PROCEDURE — APPNB60 APP NON BILLABLE TIME 46-60 MINS: Performed by: PHYSICIAN ASSISTANT

## 2023-01-20 PROCEDURE — 2580000003 HC RX 258: Performed by: ORTHOPAEDIC SURGERY

## 2023-01-20 PROCEDURE — 86850 RBC ANTIBODY SCREEN: CPT

## 2023-01-20 PROCEDURE — 85520 HEPARIN ASSAY: CPT

## 2023-01-20 PROCEDURE — 6370000000 HC RX 637 (ALT 250 FOR IP): Performed by: ORTHOPAEDIC SURGERY

## 2023-01-20 PROCEDURE — 6360000002 HC RX W HCPCS: Performed by: ORTHOPAEDIC SURGERY

## 2023-01-20 PROCEDURE — 94640 AIRWAY INHALATION TREATMENT: CPT

## 2023-01-20 PROCEDURE — 2500000003 HC RX 250 WO HCPCS: Performed by: INTERNAL MEDICINE

## 2023-01-20 PROCEDURE — P9016 RBC LEUKOCYTES REDUCED: HCPCS

## 2023-01-20 PROCEDURE — 2700000000 HC OXYGEN THERAPY PER DAY

## 2023-01-20 PROCEDURE — 99222 1ST HOSP IP/OBS MODERATE 55: CPT | Performed by: INTERNAL MEDICINE

## 2023-01-20 PROCEDURE — 36415 COLL VENOUS BLD VENIPUNCTURE: CPT

## 2023-01-20 PROCEDURE — 93005 ELECTROCARDIOGRAM TRACING: CPT | Performed by: NURSE PRACTITIONER

## 2023-01-20 PROCEDURE — 93010 ELECTROCARDIOGRAM REPORT: CPT | Performed by: INTERNAL MEDICINE

## 2023-01-20 PROCEDURE — 85027 COMPLETE CBC AUTOMATED: CPT

## 2023-01-20 PROCEDURE — 84484 ASSAY OF TROPONIN QUANT: CPT

## 2023-01-20 PROCEDURE — 86900 BLOOD TYPING SEROLOGIC ABO: CPT

## 2023-01-20 PROCEDURE — 85014 HEMATOCRIT: CPT

## 2023-01-20 PROCEDURE — 85730 THROMBOPLASTIN TIME PARTIAL: CPT

## 2023-01-20 PROCEDURE — 81001 URINALYSIS AUTO W/SCOPE: CPT

## 2023-01-20 PROCEDURE — 80048 BASIC METABOLIC PNL TOTAL CA: CPT

## 2023-01-20 PROCEDURE — 86901 BLOOD TYPING SEROLOGIC RH(D): CPT

## 2023-01-20 PROCEDURE — 86923 COMPATIBILITY TEST ELECTRIC: CPT

## 2023-01-20 RX ORDER — KETOTIFEN FUMARATE 0.35 MG/ML
1 SOLUTION/ DROPS OPHTHALMIC 2 TIMES DAILY
COMMUNITY

## 2023-01-20 RX ORDER — HEPARIN SODIUM 1000 [USP'U]/ML
2000 INJECTION, SOLUTION INTRAVENOUS; SUBCUTANEOUS PRN
Status: DISCONTINUED | OUTPATIENT
Start: 2023-01-20 | End: 2023-01-22

## 2023-01-20 RX ORDER — GABAPENTIN 100 MG/1
100 CAPSULE ORAL 3 TIMES DAILY
COMMUNITY

## 2023-01-20 RX ORDER — ROSUVASTATIN CALCIUM 10 MG/1
40 TABLET, COATED ORAL EVERY EVENING
Status: DISCONTINUED | OUTPATIENT
Start: 2023-01-20 | End: 2023-01-23 | Stop reason: HOSPADM

## 2023-01-20 RX ORDER — HEPARIN SODIUM 1000 [USP'U]/ML
4000 INJECTION, SOLUTION INTRAVENOUS; SUBCUTANEOUS PRN
Status: DISCONTINUED | OUTPATIENT
Start: 2023-01-20 | End: 2023-01-22

## 2023-01-20 RX ORDER — ESCITALOPRAM OXALATE 10 MG/1
20 TABLET ORAL DAILY
Status: DISCONTINUED | OUTPATIENT
Start: 2023-01-20 | End: 2023-01-23 | Stop reason: HOSPADM

## 2023-01-20 RX ORDER — SELENIUM SULFIDE 2.5 MG/100ML
LOTION TOPICAL DAILY PRN
COMMUNITY

## 2023-01-20 RX ORDER — TAMSULOSIN HYDROCHLORIDE 0.4 MG/1
0.4 CAPSULE ORAL NIGHTLY
Status: DISCONTINUED | OUTPATIENT
Start: 2023-01-20 | End: 2023-01-23 | Stop reason: HOSPADM

## 2023-01-20 RX ORDER — ROSUVASTATIN CALCIUM 40 MG/1
40 TABLET, COATED ORAL EVERY EVENING
COMMUNITY

## 2023-01-20 RX ORDER — PANTOPRAZOLE SODIUM 40 MG/1
40 TABLET, DELAYED RELEASE ORAL
Status: DISCONTINUED | OUTPATIENT
Start: 2023-01-20 | End: 2023-01-23 | Stop reason: HOSPADM

## 2023-01-20 RX ORDER — HEPARIN SODIUM 1000 [USP'U]/ML
4000 INJECTION, SOLUTION INTRAVENOUS; SUBCUTANEOUS ONCE
Status: COMPLETED | OUTPATIENT
Start: 2023-01-20 | End: 2023-01-20

## 2023-01-20 RX ORDER — FLUTICASONE PROPIONATE 110 UG/1
2 AEROSOL, METERED RESPIRATORY (INHALATION) 2 TIMES DAILY
Status: DISCONTINUED | OUTPATIENT
Start: 2023-01-20 | End: 2023-01-23 | Stop reason: HOSPADM

## 2023-01-20 RX ORDER — METOPROLOL TARTRATE 5 MG/5ML
5 INJECTION INTRAVENOUS ONCE
Status: DISCONTINUED | OUTPATIENT
Start: 2023-01-20 | End: 2023-01-23 | Stop reason: HOSPADM

## 2023-01-20 RX ORDER — SODIUM CHLORIDE 9 MG/ML
INJECTION, SOLUTION INTRAVENOUS PRN
Status: DISCONTINUED | OUTPATIENT
Start: 2023-01-20 | End: 2023-01-23 | Stop reason: HOSPADM

## 2023-01-20 RX ORDER — GABAPENTIN 100 MG/1
200 CAPSULE ORAL 3 TIMES DAILY
Status: DISCONTINUED | OUTPATIENT
Start: 2023-01-20 | End: 2023-01-23 | Stop reason: HOSPADM

## 2023-01-20 RX ORDER — TAMSULOSIN HYDROCHLORIDE 0.4 MG/1
0.4 CAPSULE ORAL NIGHTLY
Status: ON HOLD | COMMUNITY
End: 2023-01-21 | Stop reason: CLARIF

## 2023-01-20 RX ORDER — LISINOPRIL 10 MG/1
10 TABLET ORAL DAILY
Status: DISCONTINUED | OUTPATIENT
Start: 2023-01-20 | End: 2023-01-23 | Stop reason: HOSPADM

## 2023-01-20 RX ORDER — POLYETHYLENE GLYCOL 3350 17 G/17G
17 POWDER, FOR SOLUTION ORAL 2 TIMES DAILY
COMMUNITY

## 2023-01-20 RX ORDER — FINASTERIDE 5 MG/1
5 TABLET, FILM COATED ORAL DAILY
Status: DISCONTINUED | OUTPATIENT
Start: 2023-01-20 | End: 2023-01-23 | Stop reason: HOSPADM

## 2023-01-20 RX ORDER — BUPROPION HYDROCHLORIDE 100 MG/1
100 TABLET ORAL DAILY
Status: DISCONTINUED | OUTPATIENT
Start: 2023-01-20 | End: 2023-01-23 | Stop reason: HOSPADM

## 2023-01-20 RX ORDER — 0.9 % SODIUM CHLORIDE 0.9 %
500 INTRAVENOUS SOLUTION INTRAVENOUS ONCE
Status: DISCONTINUED | OUTPATIENT
Start: 2023-01-20 | End: 2023-01-23 | Stop reason: HOSPADM

## 2023-01-20 RX ORDER — VENLAFAXINE HYDROCHLORIDE 37.5 MG/1
75 CAPSULE, EXTENDED RELEASE ORAL DAILY
Status: DISCONTINUED | OUTPATIENT
Start: 2023-01-20 | End: 2023-01-20

## 2023-01-20 RX ORDER — HEPARIN SODIUM 10000 [USP'U]/100ML
930 INJECTION, SOLUTION INTRAVENOUS CONTINUOUS
Status: DISCONTINUED | OUTPATIENT
Start: 2023-01-20 | End: 2023-01-22

## 2023-01-20 RX ORDER — IPRATROPIUM BROMIDE 21 UG/1
2 SPRAY, METERED NASAL EVERY 6 HOURS PRN
COMMUNITY

## 2023-01-20 RX ORDER — CARVEDILOL 6.25 MG/1
12.5 TABLET ORAL 2 TIMES DAILY
Status: DISCONTINUED | OUTPATIENT
Start: 2023-01-20 | End: 2023-01-23 | Stop reason: HOSPADM

## 2023-01-20 RX ORDER — ATORVASTATIN CALCIUM 80 MG/1
80 TABLET, FILM COATED ORAL NIGHTLY
Status: DISCONTINUED | OUTPATIENT
Start: 2023-01-20 | End: 2023-01-20

## 2023-01-20 RX ADMIN — BUPROPION HYDROCHLORIDE 100 MG: 100 TABLET, FILM COATED ORAL at 08:12

## 2023-01-20 RX ADMIN — TRAMADOL HYDROCHLORIDE 50 MG: 50 TABLET ORAL at 06:33

## 2023-01-20 RX ADMIN — SODIUM CHLORIDE, PRESERVATIVE FREE 10 ML: 5 INJECTION INTRAVENOUS at 20:13

## 2023-01-20 RX ADMIN — ESCITALOPRAM OXALATE 20 MG: 10 TABLET ORAL at 08:03

## 2023-01-20 RX ADMIN — Medication 2 PUFF: at 20:33

## 2023-01-20 RX ADMIN — HEPARIN SODIUM 930 UNITS/HR: 10000 INJECTION, SOLUTION INTRAVENOUS at 08:19

## 2023-01-20 RX ADMIN — TRAMADOL HYDROCHLORIDE 50 MG: 50 TABLET ORAL at 13:13

## 2023-01-20 RX ADMIN — LISINOPRIL 10 MG: 10 TABLET ORAL at 08:03

## 2023-01-20 RX ADMIN — Medication 2 PUFF: at 12:21

## 2023-01-20 RX ADMIN — VENLAFAXINE HYDROCHLORIDE 75 MG: 37.5 CAPSULE, EXTENDED RELEASE ORAL at 08:02

## 2023-01-20 RX ADMIN — TRAMADOL HYDROCHLORIDE 50 MG: 50 TABLET ORAL at 20:07

## 2023-01-20 RX ADMIN — CARVEDILOL 12.5 MG: 6.25 TABLET, FILM COATED ORAL at 20:07

## 2023-01-20 RX ADMIN — CEFAZOLIN 2000 MG: 10 INJECTION, POWDER, FOR SOLUTION INTRAVENOUS at 06:22

## 2023-01-20 RX ADMIN — HEPARIN SODIUM 4000 UNITS: 1000 INJECTION INTRAVENOUS; SUBCUTANEOUS at 08:04

## 2023-01-20 RX ADMIN — PANTOPRAZOLE SODIUM 40 MG: 40 TABLET, DELAYED RELEASE ORAL at 06:23

## 2023-01-20 RX ADMIN — HEPARIN SODIUM 930 UNITS/HR: 10000 INJECTION, SOLUTION INTRAVENOUS at 08:09

## 2023-01-20 RX ADMIN — CARVEDILOL 12.5 MG: 6.25 TABLET, FILM COATED ORAL at 08:03

## 2023-01-20 ASSESSMENT — PAIN SCALES - GENERAL
PAINLEVEL_OUTOF10: 9
PAINLEVEL_OUTOF10: 6
PAINLEVEL_OUTOF10: 5
PAINLEVEL_OUTOF10: 7
PAINLEVEL_OUTOF10: 6
PAINLEVEL_OUTOF10: 2

## 2023-01-20 ASSESSMENT — PAIN DESCRIPTION - ORIENTATION
ORIENTATION: RIGHT;LEFT
ORIENTATION: LEFT
ORIENTATION: LEFT

## 2023-01-20 ASSESSMENT — PAIN DESCRIPTION - LOCATION
LOCATION: ARM;SHOULDER
LOCATION: ARM
LOCATION: ARM

## 2023-01-20 ASSESSMENT — PAIN DESCRIPTION - DESCRIPTORS: DESCRIPTORS: ACHING;DISCOMFORT;SHARP;SHOOTING;STABBING

## 2023-01-20 ASSESSMENT — PAIN - FUNCTIONAL ASSESSMENT: PAIN_FUNCTIONAL_ASSESSMENT: PREVENTS OR INTERFERES WITH MANY ACTIVE NOT PASSIVE ACTIVITIES

## 2023-01-20 NOTE — PROGRESS NOTES
Begum catheter removed per order. Pt tolerated well. Pt provided with urinal at bedside for voiding trial. Pt aware of need to void within 6-8 hours.    Electronically signed by Garret Castro RN on 1/20/2023 at 4:19 PM

## 2023-01-20 NOTE — PROGRESS NOTES
Right forearm PIV noted to be infiltrated where blood was infusing. Large mass above insertion site. Mass marked with marker. Lauren Amaya MD made aware. Per MD place cold compresses and elevate extremity, continue heparin drip. B  Charge RN at bedside for second set of eyes.

## 2023-01-20 NOTE — PROGRESS NOTES
Department of Orthopedic Surgery  Physician Assistant   Progress Note    Subjective:       Systemic or Specific Complaints: Tolerated surgery well yesterday but overnight developed some cardiac symptoms and has been moved to tele floor this morning. States he has some pain in the left shld today but tolerable.     Objective:     Patient Vitals for the past 24 hrs:   BP Temp Temp src Pulse Resp SpO2 Height Weight   01/20/23 1313 -- -- -- -- 16 -- -- --   01/20/23 1225 -- -- -- -- -- 93 % -- --   01/20/23 1200 99/64 98.2 °F (36.8 °C) Oral 80 18 96 % -- --   01/20/23 1145 96/62 98.2 °F (36.8 °C) Oral 84 18 94 % -- --   01/20/23 1133 120/71 98.1 °F (36.7 °C) Oral 85 18 93 % -- --   01/20/23 0800 (!) 150/76 98.2 °F (36.8 °C) Oral 90 18 97 % -- --   01/20/23 0745 -- -- -- -- -- 96 % -- --   01/20/23 0215 -- 98 °F (36.7 °C) Oral 98 17 -- -- --   01/19/23 2325 131/88 98.3 °F (36.8 °C) Oral (!) 119 19 96 % -- --   01/19/23 2100 -- 99.1 °F (37.3 °C) Oral (!) 130 19 95 % -- --   01/19/23 2018 -- -- -- -- -- -- 5' 9\" (1.753 m) 170 lb (77.1 kg)   01/19/23 1730 -- 98.7 °F (37.1 °C) Oral (!) 107 18 92 % -- --   01/19/23 1710 -- -- -- (!) 103 18 92 % -- --   01/19/23 1705 (!) 155/95 -- -- (!) 101 20 96 % -- --   01/19/23 1700 -- -- -- 100 21 99 % -- --   01/19/23 1655 (!) 158/89 -- -- 100 14 95 % -- --   01/19/23 1650 (!) 148/75 -- -- 100 13 90 % -- --   01/19/23 1645 (!) 152/99 -- -- 100 15 (!) 87 % -- --   01/19/23 1640 (!) 139/90 -- -- 97 16 94 % -- --   01/19/23 1635 (!) 155/86 -- -- 100 16 91 % -- --   01/19/23 1630 (!) 152/92 -- -- 98 12 98 % -- --   01/19/23 1625 (!) 149/96 -- -- 98 12 92 % -- --   01/19/23 1620 -- -- -- 97 16 91 % -- --   01/19/23 1615 134/86 -- -- 97 13 95 % -- --   01/19/23 1610 (!) 157/77 -- -- 99 14 93 % -- --   01/19/23 1605 (!) 145/83 -- -- 98 11 97 % -- --   01/19/23 1600 (!) 152/76 -- -- 97 17 91 % -- --   01/19/23 1555 (!) 143/82 -- -- 97 14 95 % -- --   01/19/23 1550 137/78 -- -- 97 21 94 % -- -- 01/19/23 1545 (!) 151/86 -- -- 95 15 95 % -- --   01/19/23 1540 (!) 162/78 -- -- 95 16 96 % -- --   01/19/23 1535 (!) 157/82 -- -- 95 14 98 % -- --   01/19/23 1530 (!) 163/97 -- -- 95 17 91 % -- --   01/19/23 1525 -- -- -- 96 14 99 % -- --   01/19/23 1520 -- -- -- 99 15 96 % -- --   01/19/23 1515 (!) 158/86 -- -- 100 13 97 % -- --   01/19/23 1510 -- -- -- (!) 104 20 (!) 77 % -- --   01/19/23 1505 (!) 158/75 -- -- (!) 106 16 93 % -- --   01/19/23 1500 (!) 146/86 -- -- (!) 108 15 93 % -- --   01/19/23 1455 138/87 -- -- (!) 109 (!) 7 92 % -- --   01/19/23 1452 138/87 96.8 °F (36 °C) Temporal (!) 110 18 -- -- --       General: alert, appears stated age, and cooperative   Wound: Wound clean and dry no evidence of infection. , No Erythema, No Drainage, Wound Intact, and Positive for Edema   Motion: Painful range of Motion in affected extremity   DVT Exam: No evidence of DVT seen on physical exam.     Additional exam: hematoma present on the right forearm from infiltrated IV earlier today. Mild oozing from IV site. Moving all fingers, hand, wrist on the right forearm. Good radial pulse and sensation intact to touch. Left shoulder dressing c/d/I. Sensation intact to touch in the left arm with good radial pulse. Data Review  CBC:   Lab Results   Component Value Date/Time    WBC 9.7 01/20/2023 04:24 AM    RBC 2.26 01/20/2023 04:24 AM    HGB 7.4 01/20/2023 04:24 AM    HCT 22.5 01/20/2023 04:24 AM     01/20/2023 04:24 AM       Renal:   Lab Results   Component Value Date/Time     01/20/2023 04:24 AM    K 4.5 01/20/2023 04:24 AM    K 4.0 07/12/2019 09:16 AM     01/20/2023 04:24 AM    CO2 20 01/20/2023 04:24 AM    BUN 33 01/20/2023 04:24 AM    CREATININE 1.6 01/20/2023 04:24 AM    GLUCOSE 129 01/20/2023 04:24 AM    CALCIUM 9.1 01/20/2023 04:24 AM        Trop: 0.28    Assessment:      left shoulder IM nail, right forearm hematoma, Acute blood loss anemia - expected after surgery .      Plan:      1: Continue ice, sling, pain control for the left shoulder. IM managing cardiac symptoms at this time. Will recheck Hgb tomorrow after blood today. Dressing to the right forearm IV site and can continue with hand ROM and ice for swelling. Will take time for hematoma to reabsorb. FERMIN KAM. PT/OT as able.   2:  Continue Deep venous thrombosis prophylaxis  3:  Continue Pain Control    DELORIS Davis

## 2023-01-20 NOTE — CONSULTS
[id number] [facility] COMPLAINT  tachycardia, elevated troponin level      HISTORY OF PRESENTING ILLNESS  Rose Guerin is a 79 y.o. patient who presented to the hospital with complaints of a mechanical fall followed by severe left shoulder pain. He was noted to have humerus dislocation and humoral head fracture. He went to OR for internal fixation. Now he is with minimal shoulder pain. Overnight he was noted to be  tachycardic in 100s without any symptoms. Trop level was checked and ECG performed. Trop levls came back mildly positive b ut flat trend. I have been asked to provide consultation regarding further management and testing. He reports prior history of stent placements but records not available since he is a [place] patient. He denies chest pain/heaviness, palpitations, edema, orthopnea, lightheadedness, syncope. PAST MEDICAL HISTORY   has a past medical history of Asthma, CAD (coronary artery disease), HTN (hypertension), Hyperlipidemia, and Prostate cancer ([facility] [address] SURGICAL HISTORY   has a past surgical history that includes knee surgery; Bunionectomy; eye surgery; joint replacement; Cardiac surgery; and Humerus fracture surgery (Left, 1/19/2023). SOCIAL HISTORY   reports that he has quit smoking. He has never used smokeless tobacco. He reports that he does not currently use alcohol. He reports that he does not use drugs. FAMILY HISTORY  No family history of premature coronary artery disease, aortic disease, or valve disease. HOME CARDIAC MEDICATIONS  Were reviewed and are listed in nursing record. and/or listed below  Prior to Admission medications    Medication Sig Start Date End Date Taking? Authorizing Provider   gabapentin (NEURONTIN) 100 MG capsule Take 100 mg by mouth 3 times daily.  2 caps TI   Yes Historical Provider, MD   ipratropium (ATROVENT) 0.03 % nasal spray 2 sprays by Each Nostril route every 6 hours as needed for Rhinitis   Yes Historical Provider, MD   ketotifen (ZADITOR) 0.025 % ophthalmic solution Place 1 drop into both eyes 2 times daily   Yes Historical Provider, MD   mometasone (ASMANEX) 220 MCG/ACT AEPB inhaler Inhale 2 puffs into the lungs at bedtime   Yes Historical Provider, MD   polyethylene glycol (GLYCOLAX) 17 g packet Take 17 g by mouth 2 times daily   Yes Historical Provider, MD   rosuvastatin (CRESTOR) 40 MG tablet Take 40 mg by mouth every evening   Yes Historical Provider, MD   selenium sulfide (SELSUN) 2.5 % lotion Apply topically daily as needed for Itching Shampoo, use 3 x week , leave on for 5 minutes then rinse   Yes Historical Provider, MD   tamsulosin (FLOMAX) 0.4 MG capsule Take 0.4 mg by mouth nightly   Yes Historical Provider, MD   Ubrogepant 100 MG TABS Take 1 tablet by mouth One tab by mouth at onset of migraine,do not exceed 200mg/24 hours. Ok to repeat in 2 hours if headache continues. Yes Historical Provider, MD   buPROPion (WELLBUTRIN) 100 MG tablet Take 100 mg by mouth daily   Yes Historical Provider, MD   pantoprazole (PROTONIX) 40 MG tablet Take 40 mg by mouth every morning (before breakfast)    Historical Provider, MD   escitalopram (LEXAPRO) 20 MG tablet Take 20 mg by mouth daily    Historical Provider, MD   aspirin 81 MG chewable tablet Take 1 tablet by mouth daily 7/14/19   Stacy Franco MD   atorvastatin (LIPITOR) 80 MG tablet Take 1 tablet by mouth nightly 7/13/19   Cleo Aquino, APRN - CNP   carvedilol (COREG) 12.5 MG tablet Take 12.5 mg by mouth 2 times daily     Historical Provider, MD   lisinopril (PRINIVIL;ZESTRIL) 10 MG tablet Take 10 mg by mouth    Historical Provider, MD   venlafaxine (EFFEXOR-XR) 75 MG XR capsule Take 75 mg by mouth daily. Historical Provider, MD   latanoprost (XALATAN) 0.005 % ophthalmic solution Place 1 drop into both eyes nightly    Historical Provider, MD   metronidazole (METROCREAM) 0.75 % cream Apply  topically daily. Apply topically 2 times daily.     Historical Provider, MD        ALLERGIES  Pcn [penicillins], Percocet [oxycodone-acetaminophen], and Vicodin [hydrocodone-acetaminophen]     REVIEW OF SYSTEMS  Constitutional: there has been no unanticipated weight loss. There's been no change in energy level, sleep pattern, or activity level. Eyes: No visual changes or diplopia. No scleral icterus. ENT: No Headaches, hearing loss or vertigo. No mouth sores or sore throat. Cardiovascular: Reviewed in HPI  Respiratory: No cough or wheezing, no sputum production. No hematemesis. Gastrointestinal: No abdominal pain, appetite loss, blood in stools. No change in bowel or bladder habits. Genitourinary: No dysuria, trouble voiding, or hematuria. Musculoskeletal:  No gait disturbance, weakness, +left shoulder pain  Integumentary: No rash or pruritis. Neurological: No headache, diplopia, change in muscle strength, numbness or tingling. No change in gait, balance, coordination, mood, affect, memory, mentation, behavior. Psychiatric: No anxiety, no depression. Endocrine: No malaise, fatigue or temperature intolerance. No excessive thirst, fluid intake, or urination. No tremor. Hematologic/Lymphatic: No abnormal bruising or bleeding, blood clots or swollen lymph nodes. Allergic/Immunologic: No nasal congestion or hives.       PHYSICAL EXAMINATION      Height: 5' 9\" (1.753 m), Weight: 170 lb (77.1 kg), BP: 124/83, Pain 0-10: Pain Level: 9, Location: Left shoulder;       General appearance - alert, cooperative, no distress, appears stated age  Head - Normocephalic, without obvious abnormality, atraumatic  Eyes - PERRL, conjunctiva/corneas clear  Nose - Nares normal, no drainage or sinus tenderness  Throat - Lips, mucosa, and tongue normal  Neck - Supple, symmetrical, trachea midline, no adenopathy, thyroid: not enlarged, symmetric, no tenderness/mass/nodules, no carotid bruit or JVD  Lungs - Clear to auscultation bilaterally, respirations unlabored  Chest wall - No tenderness or deformity  Heart - Regular rate and rhythm, S1, S2 normal, no murmur, no rub or gallop  Abdomen - Soft, non-tender, bowel sounds active all four quadrants,  no masses, no organomegaly  Extremities - Left shoulder in dressing and cast so unable to examine fully. Range of motion is very limited. , no cyanosis or edema  Pulses - 2+ and symmetric upper and lower extremities  Skin - Skin color, texture, turgor normal, no rashes or lesions  Psych - Normal mood and affect  Neurologic - Normal gross motor and sensory exam.        LABS  Lab Results   Component Value Date/Time    WBC 9.7 01/20/2023 04:24 AM    RBC 2.26 01/20/2023 04:24 AM    HGB 7.4 01/20/2023 04:24 AM    HCT 22.5 01/20/2023 04:24 AM    MCV 99.6 01/20/2023 04:24 AM    RDW 20.2 01/20/2023 04:24 AM     01/20/2023 04:24 AM     Lab Results   Component Value Date/Time     01/20/2023 04:24 AM    K 4.5 01/20/2023 04:24 AM    K 4.0 07/12/2019 09:16 AM     01/20/2023 04:24 AM    CO2 20 01/20/2023 04:24 AM    BUN 33 01/20/2023 04:24 AM    CREATININE 1.6 01/20/2023 04:24 AM    GFRAA >60 07/12/2019 09:16 AM    GFRAA 51 03/18/2013 07:13 AM    AGRATIO 1.2 07/11/2019 04:30 PM    LABGLOM 42 01/20/2023 04:24 AM    GLUCOSE 129 01/20/2023 04:24 AM    PROT 6.5 07/11/2019 04:30 PM    PROT 6.8 03/18/2013 07:13 AM    CALCIUM 9.1 01/20/2023 04:24 AM    BILITOT 0.3 07/11/2019 04:30 PM    ALKPHOS 50 07/11/2019 04:30 PM    AST 14 07/11/2019 04:30 PM    ALT 11 07/11/2019 04:30 PM     No results found for: PTINR  Lab Results   Component Value Date    TROPONINI 0.28 (H) 01/20/2023     Lab Results   Component Value Date    CHOL 156 07/12/2019     Lab Results   Component Value Date    TRIG 125 07/12/2019     Lab Results   Component Value Date    HDL 48 07/12/2019     Lab Results   Component Value Date    LDLCALC 83 07/12/2019     No results found for: [facility]      CARDIAC STUDIES    EKG - sinus rhythm, possible inferior infarct given q-waves in inferior leads however these findings have been noted in previous ekgs over the years. Echo - 2019   Summary   Normal left ventricular systolic function with a visually estimated ejection   fraction of 55%. No regional wall motion abnormalities are seen. Grade I diastolic dysfunction with normal LV filling pressures. The right atrium is moderately enlarged. Mild aortic and mitral regurgitation. Moderate pulmonic regurgitation. Stress - 2019   Normal LV size and systolic function. Left ventricular ejection fraction of    68%. Normal wall motion. There is normal isotope uptake at stress and rest. There is no evidence of    myocardial ischemia or scar. Cath - not available    High complexity/medical decision making due to extensive data review, extensive history review, independent review of data    High risk due to acute illness, evaluation of drug-drug interactions, medication management and diagnostic interventions      ASSESSMENT & PLAN:    Troponin elevation - Low values and somewhat flat trend. ECG without ischemic findings. Does have q-waves in inferior leads similar to findings in previous ecgs over the years. No unusual or new chest pain or dyspnea reported. No angina, no signs of heart failure although LV function is not known  - keep on tele  - obtain TTE  - okay to continue heparin for 48 hours  - if no significant abnormalities noted, he can be discharged with outpt cardiology follow up for further workup  - check lipids, last tested in 2019  - if intervention planned in the future, safety of dual antiplatelet therapy will need to be established given profound anemia this admission without clear source  - please keep NPO tonight in case any invasive procedures are planned    Anemia - unknown baseline in the recent years and no testing done prior to recent operation.   - recommend anemia work up including iron studies and GI consult    CAD - reports getting stents years ago but no records available. Will try to obtain records from [place]    Tachycardia - likely secondary tachycardia due to pain, profound anemia etc.  - should improve once home BB restarted  - will continue to treat underlying etiologies    All questions and concerns were addressed to the patient/family. Alternatives to my treatment as well as risks and benefits of proposed treatment were discussed. Tobacco use was discussed with the patient and educated on the negative effects. Thank you for allowing to us to participate in the care or Rose Guerin. Please call with questions.          Ellie Ridley MD, [facility]  Interventional Cardiology  1/20/2023 2:58 PM  [phone]      Inadvertent computerized transcription errors may be present

## 2023-01-20 NOTE — CONSENT
Informed Consent for Blood Component Transfusion Note    I have discussed with the patient the rationale for blood component transfusion; its benefits in treating or preventing fatigue, organ damage, or death; and its risk which includes mild transfusion reactions, rare risk of blood borne infection, or more serious but rare reactions. I have discussed the alternatives to transfusion, including the risk and consequences of not receiving transfusion. The patient had an opportunity to ask questions and had agreed to proceed with transfusion of blood components.     Electronically signed by Monique Baldwin MD on 1/20/23 at 8:23 AM EST

## 2023-01-20 NOTE — PROGRESS NOTES
Physical Therapy/Occupational Therapy    Attempted to see pt for PT/OT evaluation. Per discussion with RN, pt currently receiving blood. Will hold eval until after infusion. Will continue to follow and re-attempt as schedule allows.     Rabia Rabago, PT, DPT  Farzad Arreola, OTR/L

## 2023-01-20 NOTE — CARE COORDINATION
Oval Parisian in registration notified that patient needs an IMM letter - did not receive one on admission.

## 2023-01-20 NOTE — PROGRESS NOTES
Perfect serve to Hector Silva MD  Patient comes with a hernandez that was placed back at the [place] due to his large prostate. pt was supposed to have a follow up appointment today with another urologist... wife is wondering if we could go ahead and do a voiding trial now, and replace hernandez if fails, or get urology involed? thank you! Read 3:51 PM   1/20/23 3:51 PM   Ok for voiding trial. If he fails, will consult urology.

## 2023-01-20 NOTE — CARE COORDINATION
CASE MANAGEMENT INITIAL ASSESSMENT      Reviewed chart and completed assessment with patient's wife       Health Care Decision Maker :   Primary Decision Maker: Glo Puente - Spouse - [phone]    Secondary Decision Maker: Sepideh Ruth - Child - [phone]          Can this person be reached and be able to respond quickly, such as within a few minutes or hours? Yes      Admit date/status:1/19/23 Inpatient   Diagnosis:humerus fracture    Is this a Readmission?:  No    Readmission Screening completed?: No     Insurance:[employer], [place]    Precert required for SNF: Yes       3 night stay required: No    Living arrangements, Adls, care needs, prior to admission: lives in a two level house with his wife    Durable Medical Equipment at home:  Walker_X_Cane__RTS_X_ BSC__Shower Chair_X_  02__ HHN__ CPAP__  BiPap__  Hospital Bed__ W/C_X__ Other_chair stair lift____    Services in the home and/or outpatient, prior to admission:active with home care through [place]    Current PCP:[facility] needs: TBD     Dialysis Facility (if applicable)   Name:  Address:  Dialysis Schedule:  Phone:  Fax:    PT/OT recs:none yet, watch for rec's    [address] Notification (HEN):not initiated     Barriers to discharge:none    Plan/comments:Patient is from home. Wife states she would like to see patient discharge to a skilled nursing facility as he has been very weak at home. She states it has been difficult to manage his needs at home by herself and would like to see him get some rehab before returning home. Informed her that therapy has been ordered and CM will follow up after they have made their recommendations. She verbalized understanding. Patient would need a precert.      ECOC on chart for MD signature

## 2023-01-20 NOTE — CONSULTS
Hospital Medicine  Consult History & Physical        Chief Complaint:  left shoulder pain    Date of Service: Pt seen/examined in consultation on 1/20/2023    History Of Present Illness:      79 y.o. male, s/p intramedullary nailing of the left proximal humerus fracture, who we are asked to see/evaluate by Yari Barksdale MD for medical management of hypertension and tachycardia. The patient is doing well postoperatively. He is resting quietly in bed. The patient stated depending on how he moves his arm he has 7/10 pain. He was recently medicated with tramadol. This may be contributing to his tachycardia. The patient was also hypertensive upon arriving to the floor. He is now normotensive. His home medications have been reordered. The patient stated he is taking p.o. well. He denies any comments, questions and or concerns at this time. Past Medical History:        Diagnosis Date    Asthma     CAD (coronary artery disease)     HTN (hypertension) 7/12/2019    Hyperlipidemia     Prostate cancer [facility])      Past Surgical History:        Procedure Laterality Date    BUNIONECTOMY      CARDIAC SURGERY      coronary stents x2    EYE SURGERY      Retinal reattachment - bilateral    JOINT REPLACEMENT      Bilateral knees    KNEE SURGERY       Medications Prior to Admission:    Prior to Admission medications    Medication Sig Start Date End Date Taking?  Authorizing Provider   buPROPion (WELLBUTRIN) 100 MG tablet Take 100 mg by mouth daily   Yes Historical Provider, MD   pantoprazole (PROTONIX) 40 MG tablet Take 40 mg by mouth every morning (before breakfast)    Historical Provider, MD   escitalopram (LEXAPRO) 20 MG tablet Take 20 mg by mouth daily    Historical Provider, MD   metoprolol tartrate (LOPRESSOR) 25 MG tablet Take 25 mg by mouth 2 times daily  Patient not taking: Reported on 1/19/2023    Historical Provider, MD   aspirin 81 MG chewable tablet Take 1 tablet by mouth daily 7/14/19   Kassie Membreno MD nitroGLYCERIN (NITROSTAT) 0.4 MG SL tablet Place 1 tablet under the tongue every 5 minutes as needed for Chest pain up to max of 3 total doses. If no relief after 1 dose, call 911. Patient not taking: Reported on 1/19/2023 7/13/19   DYLAN Taylor CNP   atorvastatin (LIPITOR) 80 MG tablet Take 1 tablet by mouth nightly 7/13/19   DYLAN Taylor CNP   carvedilol (COREG) 12.5 MG tablet Take 12.5 mg by mouth 2 times daily     Historical Provider, MD   lisinopril (PRINIVIL;ZESTRIL) 10 MG tablet Take 10 mg by mouth    Historical Provider, MD   buPROPion (WELLBUTRIN SR) 150 MG SR tablet Take 150 mg by mouth 2 times daily. Patient not taking: Reported on 1/19/2023    Historical Provider, MD   venlafaxine (EFFEXOR-XR) 75 MG XR capsule Take 75 mg by mouth daily. Historical Provider, MD   latanoprost (XALATAN) 0.005 % ophthalmic solution Place 1 drop into both eyes nightly    Historical Provider, MD   metronidazole (METROCREAM) 0.75 % cream Apply  topically daily. Apply topically 2 times daily. Historical Provider, MD     Allergies:  Pcn [penicillins], Percocet [oxycodone-acetaminophen], and Vicodin [hydrocodone-acetaminophen]    Social History:      The patient currently lives at home    TOBACCO:   reports that he has quit smoking. He has never used smokeless tobacco.  ETOH:   reports that he does not currently use alcohol. Family History:     Reviewed in detail and negative for DM, CAD, Cancer, CVA. Positive as follows:    History reviewed. No pertinent family history. REVIEW OF SYSTEMS COMPLETED:   Pertinent positives as noted in the HPI. All other systems reviewed and negative. PHYSICAL EXAM PERFORMED:    /88   Pulse 98   Temp 98 °F (36.7 °C) (Oral)   Resp 17   Ht 5' 9\" (1.753 m)   Wt 170 lb (77.1 kg)   SpO2 96%   BMI 25.10 kg/m²     General appearance: Pleasant, elderly male in no apparent distress, appears stated age and cooperative.   HEENT: Pupils equal, round, and reactive to light.  Extra ocular muscles intact. Conjunctivae/corneas clear.  Neck: Supple, with full range of motion. No jugular venous distention. Trachea midline.  Respiratory:  Normal respiratory effort. Clear to auscultation, bilaterally without Rales/Wheezes/Rhonchi.  Cardiovascular: Regular rate and rhythm with normal S1/S2 without murmurs, rubs or gallops.  Abdomen: Soft, non-tender, non-distended with normal bowel sounds.  Musculoskeletal:  No clubbing, cyanosis or edema bilaterally.  Decreased ROM left arm related to surgery  Skin: Skin color, texture, turgor normal.  OR dressing clean dry and intact,.  Sling in place  Neurologic:  Neurovascularly intact  Cranial nerves: II-XII intact, grossly non-focal.  Psychiatric: Alert and oriented, thought content appropriate, normal insight  Capillary Refill: Brisk,3 seconds, normal   Peripheral Pulses: +2 palpable, equal bilaterally     Labs:     Recent Labs     01/19/23  1104   *   K 4.1      CO2 21   BUN 33*   CREATININE 1.5*   CALCIUM 9.4     Urinalysis:    Lab Results   Component Value Date/Time    NITRU Negative 01/20/2023 02:00 AM    WBCUA 3-5 01/20/2023 02:00 AM    BACTERIA 1+ 07/13/2019 12:10 AM    RBCUA 0-2 01/20/2023 02:00 AM    BLOODU Negative 01/20/2023 02:00 AM    SPECGRAV 1.010 01/20/2023 02:00 AM    GLUCOSEU Negative 01/20/2023 02:00 AM    GLUCOSEU NEGATIVE 09/02/2010 03:45 PM     Radiology: I have reviewed the radiology reports with the following interpretation:     XR HUMERUS LEFT (MIN 2 VIEWS)   Preliminary Result   Intraprocedural fluoroscopic spot images as above.  See separate procedure   report for more information.         FLUORO FOR SURGICAL PROCEDURES   Final Result        EKG:  I have reviewed the EKG with the following interpretation:    EKG pending    ASSESSMENT:    Active Hospital Problems    Diagnosis Date Noted    Post-operative pain [G89.18] 01/19/2023     Priority: Medium    HTN (hypertension) [I10] 07/12/2019    CAD (coronary  artery disease) [I25.10] 07/12/2019     PLAN:    Left shoulder pain in setting of proximal humerus fracture  -Status post intramedullary nailing of the left proximal humerus fracture  -Postop management and pain control per primary team  -As needed pain medication  -CBC in a.m. Tachycardia  -May be secondary to pain  -Treat pain accordingly  -Beta-blocker restarted  -EKG pending    Essential hypertension in setting of known CAD  -Continue Coreg and lisinopril    Hyperlipidemia  -Continue atorvastatin    Anxiety depression  -Continue Lexapro and Effexor    DVT Prophylaxis: SCDs    Diet: ADULT DIET; Regular    Code Status: Prior    PT/OT Eval Status: Active and ongoing    Dispo -per primary team    Thank you for the consultation, will follow up as needed    Electronically signed by DYLAN Dietrich CNP on 1/20/23 at 1:50 AM EST    ---------------------------Anticipated dR. Dianna her-----------------------------

## 2023-01-20 NOTE — CONSULTS
Pharmacy to Manage Heparin Infusion per [facility]    Dx: NSTEMI  Pt wt = 77.1 kg  Baseline aPTT =     Oral factor Xa-inhibitors may alter and elevate anti-Xa levels used for unfractionated heparin monitoring. As a result, anti-Xa monitoring is not accurate while Xa-inhibitor activity is detectable. Utilize aPTT monitoring when patient received an oral factor Xa-inhibitor (apixaban, betrixaban, edoxaban or rivaroxaban) within 72 hours prior to admission (please document last administration time). The goal is to allow a washout of oral factor Xa-inhibitors by using aPTT for 72 hours, then change to ant-Xa levels for UFH. Heparin (weight-based) Infusion: CAD/STEMI/NSTEMI/UA/AFib)   Heparin 60 units/kg IVP bolus (max 4,000 units) followed by Heparin infusion at 12 units/kg/hr (recommended max initial rate: 1000 units/hr). Recheck anti-Xa (unless aPTT being used) in 6 hours. Goal anti-Xa 0.3-0.7 IU/mL  Goal aPTT =  seconds.   Abida Jovel, Pharm D.1/20/2023 6:37 AM    1/20/23  Anti-Xa level = 0.56 at 1155  Continue with current rate of 830 units/hr  Next Anti-Xa level due 1/20 @ 1800  [facility]      1/20  - Anti-Xa level = 0.32  - No changes needed,continue drip at current rate of 830 units/hr per protocol  - Will check Anti-Xa daily starting 1/21am.  Jacquelin Bear/Feliberto. 1/20/23 1/21 4573  - Anti-Xa level = 0.35  - No changes needed,continue drip at current rate of 930 units/hr per protocol  - Will check Anti-Xa daily   Abida Jovel, Pharm D.1/21/2023 8:02 AM    1/22 0616  - Anti-Xa level = 0.38  - No changes needed,continue drip at current rate of 930 units/hr per protocol  - Will check Anti-Xa daily   Abida Jovel, Pharm D.1/22/2023 7:01 AM

## 2023-01-21 LAB
ANION GAP SERPL CALCULATED.3IONS-SCNC: 12 MMOL/L (ref 3–16)
ANTI-XA UNFRAC HEPARIN: 0.35 IU/ML (ref 0.3–0.7)
BUN BLDV-MCNC: 32 MG/DL (ref 7–20)
CALCIUM SERPL-MCNC: 9 MG/DL (ref 8.3–10.6)
CHLORIDE BLD-SCNC: 101 MMOL/L (ref 99–110)
CHOLESTEROL, TOTAL: 106 MG/DL (ref 0–199)
CO2: 20 MMOL/L (ref 21–32)
CREAT SERPL-MCNC: 1.4 MG/DL (ref 0.8–1.3)
GFR SERPL CREATININE-BSD FRML MDRD: 50 ML/MIN/{1.73_M2}
GLUCOSE BLD-MCNC: 124 MG/DL (ref 70–99)
HCT VFR BLD CALC: 25.5 % (ref 40.5–52.5)
HDLC SERPL-MCNC: 45 MG/DL (ref 40–60)
HEMOGLOBIN: 8.2 G/DL (ref 13.5–17.5)
LDL CHOLESTEROL CALCULATED: 44 MG/DL
LV EF: 53 %
LVEF MODALITY: NORMAL
MCH RBC QN AUTO: 32.5 PG (ref 26–34)
MCHC RBC AUTO-ENTMCNC: 32.3 G/DL (ref 31–36)
MCV RBC AUTO: 100.6 FL (ref 80–100)
PDW BLD-RTO: 19.6 % (ref 12.4–15.4)
PLATELET # BLD: 359 K/UL (ref 135–450)
PMV BLD AUTO: 7.1 FL (ref 5–10.5)
POTASSIUM REFLEX MAGNESIUM: 3.8 MMOL/L (ref 3.5–5.1)
RBC # BLD: 2.54 M/UL (ref 4.2–5.9)
SODIUM BLD-SCNC: 133 MMOL/L (ref 136–145)
TRIGL SERPL-MCNC: 86 MG/DL (ref 0–150)
TROPONIN: 0.22 NG/ML
VLDLC SERPL CALC-MCNC: 17 MG/DL
WBC # BLD: 10.7 K/UL (ref 4–11)

## 2023-01-21 PROCEDURE — 2060000000 HC ICU INTERMEDIATE R&B

## 2023-01-21 PROCEDURE — 6370000000 HC RX 637 (ALT 250 FOR IP): Performed by: ORTHOPAEDIC SURGERY

## 2023-01-21 PROCEDURE — 97165 OT EVAL LOW COMPLEX 30 MIN: CPT

## 2023-01-21 PROCEDURE — 80061 LIPID PANEL: CPT

## 2023-01-21 PROCEDURE — C8929 TTE W OR WO FOL WCON,DOPPLER: HCPCS

## 2023-01-21 PROCEDURE — 80048 BASIC METABOLIC PNL TOTAL CA: CPT

## 2023-01-21 PROCEDURE — 6370000000 HC RX 637 (ALT 250 FOR IP): Performed by: INTERNAL MEDICINE

## 2023-01-21 PROCEDURE — 97110 THERAPEUTIC EXERCISES: CPT

## 2023-01-21 PROCEDURE — 85520 HEPARIN ASSAY: CPT

## 2023-01-21 PROCEDURE — 2580000003 HC RX 258: Performed by: ORTHOPAEDIC SURGERY

## 2023-01-21 PROCEDURE — 36415 COLL VENOUS BLD VENIPUNCTURE: CPT

## 2023-01-21 PROCEDURE — 97530 THERAPEUTIC ACTIVITIES: CPT

## 2023-01-21 PROCEDURE — APPNB60 APP NON BILLABLE TIME 46-60 MINS: Performed by: PHYSICIAN ASSISTANT

## 2023-01-21 PROCEDURE — 2500000003 HC RX 250 WO HCPCS: Performed by: INTERNAL MEDICINE

## 2023-01-21 PROCEDURE — 97161 PT EVAL LOW COMPLEX 20 MIN: CPT

## 2023-01-21 PROCEDURE — 6370000000 HC RX 637 (ALT 250 FOR IP): Performed by: NURSE PRACTITIONER

## 2023-01-21 PROCEDURE — 99233 SBSQ HOSP IP/OBS HIGH 50: CPT | Performed by: NURSE PRACTITIONER

## 2023-01-21 PROCEDURE — 97535 SELF CARE MNGMENT TRAINING: CPT

## 2023-01-21 PROCEDURE — 84484 ASSAY OF TROPONIN QUANT: CPT

## 2023-01-21 PROCEDURE — 94640 AIRWAY INHALATION TREATMENT: CPT

## 2023-01-21 PROCEDURE — 85027 COMPLETE CBC AUTOMATED: CPT

## 2023-01-21 RX ADMIN — GABAPENTIN 200 MG: 100 CAPSULE ORAL at 21:35

## 2023-01-21 RX ADMIN — Medication 2 PUFF: at 19:31

## 2023-01-21 RX ADMIN — TRAMADOL HYDROCHLORIDE 50 MG: 50 TABLET ORAL at 21:35

## 2023-01-21 RX ADMIN — TRAMADOL HYDROCHLORIDE 50 MG: 50 TABLET ORAL at 03:49

## 2023-01-21 RX ADMIN — FINASTERIDE 5 MG: 5 TABLET, FILM COATED ORAL at 10:45

## 2023-01-21 RX ADMIN — ESCITALOPRAM OXALATE 20 MG: 10 TABLET ORAL at 09:35

## 2023-01-21 RX ADMIN — PANTOPRAZOLE SODIUM 40 MG: 40 TABLET, DELAYED RELEASE ORAL at 06:21

## 2023-01-21 RX ADMIN — CARVEDILOL 12.5 MG: 6.25 TABLET, FILM COATED ORAL at 21:35

## 2023-01-21 RX ADMIN — GABAPENTIN 200 MG: 100 CAPSULE ORAL at 14:48

## 2023-01-21 RX ADMIN — HEPARIN SODIUM 930 UNITS/HR: 10000 INJECTION, SOLUTION INTRAVENOUS at 06:25

## 2023-01-21 RX ADMIN — Medication 2 PUFF: at 07:55

## 2023-01-21 RX ADMIN — CARVEDILOL 12.5 MG: 6.25 TABLET, FILM COATED ORAL at 09:35

## 2023-01-21 RX ADMIN — SODIUM CHLORIDE, PRESERVATIVE FREE 10 ML: 5 INJECTION INTRAVENOUS at 21:36

## 2023-01-21 RX ADMIN — GABAPENTIN 200 MG: 100 CAPSULE ORAL at 10:45

## 2023-01-21 RX ADMIN — SODIUM CHLORIDE, PRESERVATIVE FREE 10 ML: 5 INJECTION INTRAVENOUS at 12:22

## 2023-01-21 RX ADMIN — LISINOPRIL 10 MG: 10 TABLET ORAL at 09:35

## 2023-01-21 RX ADMIN — ROSUVASTATIN CALCIUM 40 MG: 10 TABLET, FILM COATED ORAL at 17:39

## 2023-01-21 RX ADMIN — BUPROPION HYDROCHLORIDE 100 MG: 100 TABLET, FILM COATED ORAL at 10:45

## 2023-01-21 RX ADMIN — TRAMADOL HYDROCHLORIDE 50 MG: 50 TABLET ORAL at 14:48

## 2023-01-21 ASSESSMENT — PAIN SCALES - GENERAL
PAINLEVEL_OUTOF10: 6
PAINLEVEL_OUTOF10: 8
PAINLEVEL_OUTOF10: 8
PAINLEVEL_OUTOF10: 6
PAINLEVEL_OUTOF10: 2

## 2023-01-21 ASSESSMENT — PAIN DESCRIPTION - ORIENTATION: ORIENTATION: LEFT

## 2023-01-21 ASSESSMENT — PAIN DESCRIPTION - LOCATION: LOCATION: ARM

## 2023-01-21 NOTE — PROGRESS NOTES
Occupational Therapy  Facility/Department: [facility] A2 CARD TELEMETRY  Occupational Therapy Initial Assessment/Treatment    Name: Yoseph Londono  : 1940  MRN: [id number]  Date of Service: 2023    Discharge Recommendations:  [address]          Patient Diagnosis(es): There were no encounter diagnoses. Past Medical History:  has a past medical history of Asthma, CAD (coronary artery disease), HTN (hypertension), Hyperlipidemia, and Prostate cancer ([facility] [address] Past Surgical History:  has a past surgical history that includes knee surgery; Bunionectomy; eye surgery; joint replacement; Cardiac surgery; and Humerus fracture surgery (Left, 2023). Barriers to home discharge:   [x] Steps to access home entry or bed/bath:   [] No rail with steps to access home entry or bed/bath   [] Reported available assist at home upon discharge limited   [x] Patient or family requests DC post-acute facility   [] Poor cognition/safety awareness for d/c to home alone   [] Unable to maintain ordered weight bearing status   [] Other:        Assessment   Performance deficits / Impairments: Decreased functional mobility ; Decreased safe awareness;Decreased balance;Decreased ADL status; Decreased cognition;Decreased high-level IADLs;Decreased strength;Decreased endurance;Decreased posture  Patient admitted from home for scheduled IM nailing of L proximal humerus fx, s/p fall on 1/10 resulting in L proximal humerus fx. Pt rather IPTA using rollator however pt reports increased falls and weakness. Today patient up to chair via mod of 2 and cane in R hand. Pt tolerated A/AROM to L elbow/hand, ice applied for pain and edema control. Hygiene performed under B armpits. After evaluation, pt found to be presenting with the above mentioned occupational performance deficits which are affecting participation in daily living skills.  Pt would benefit from continued skilled occupational therapy to address ADLs, functional mobility, and safety while in acute care. Patient seen as Co-tx collaboration this date to safely meet goals and will have better occupational performance outcomes with in a co-treatment than 1:1 session. Prognosis: Good  Decision Making: Low Complexity  REQUIRES OT FOLLOW-UP: Yes  Activity Tolerance  Activity Tolerance: Patient Tolerated treatment well        Plan   Occupational Therapy Plan  Times Per Week: 4-6x's a week while in acute care     Restrictions  Restrictions/Precautions  Restrictions/Precautions: Fall Risk, Weight Bearing  Required Braces or Orthoses?: Yes  Upper Extremity Weight Bearing Restrictions  Left Upper Extremity Weight Bearing: Non Weight Bearing  Position Activity Restriction  Other position/activity restrictions: Per Tien Gray MD Op Note on 1/19/23 Sling: May remove sling if resting in bed for comfort. Elbow range of motion is encouraged. May remove sling for shower. Recommend sleeping semiupright in recliner or elevated head of bed in order to rest arm in sling. Subjective   General  Chart Reviewed: Yes, Orders, Progress Notes, History and Physical, Imaging, Operative Notes  Patient assessed for rehabilitation services?: Yes  Family / Caregiver Present: No  Referring Practitioner: Tien Gray MD 1/19/23  Diagnosis: LEFT PROXIMAL HUMERUS FRACTURE, s/p IM nailing on 1/19/23  Subjective  Subjective: Pt resting in bed upon entry, pleasant and agreeable to OT evaluation, states would like to get out of bed. Pain: left shoulder, 8/10 currently, has recieved pain medication prior to OT evaluation, ended session with ice applied to shoulder and bicep for pain& edema control.   Social/Functional History  Social/Functional History  Lives With: Spouse  Type of Home: House  Home Layout: Two level (bedrooms are on second floor, has a chair lift to second floor aand finished.)  Home Access: Stairs to enter with rails  Entrance Stairs - Number of Steps: 2 KAYLI from garage with BHR  Entrance Stairs - Rails: Both  Bathroom Shower/Tub: Walk-in shower, Shower chair with back  Bathroom Toilet: Handicap height  Bathroom Equipment: Grab bars in shower, Shower chair  Home Equipment: Rollator, Cane (mostly uses rollator, stair lift to second floor and basement, adjustable bed,)  Has the patient had two or more falls in the past year or any fall with injury in the past year?: Yes (3 falls in past year, fall with fractured arm brought pt into hospital.)  ADL Assistance: Independent  Homemaking Assistance: Independent  Ambulation Assistance: Independent  Transfer Assistance: Independent  Active : No  Occupation: Retired  Leisure & Hobbies: likes to read. Objective   Vitals: HR 74 BPM, SPO2 94% on room air, /76        Safety Devices  Type of Devices: Call light within reach; Chair alarm in place; Left in chair;Gait belt;Nurse notified; All paul prominences offloaded    Bed Mobility Training  Bed Mobility Training: Yes  Supine to Sit: Moderate assistance;Assist X2  Sit to Supine: Other (comment) (up to chair at end of session)  Scooting: Maximum assistance (to EOB)    Balance  Sitting: Intact-sat EOB for UB bathing. Standing: Impaired  Standing - Static: Constant support  Standing - Dynamic: Constant support    Transfer Training  Transfer Training: Yes  Sit to Stand: Moderate assistance;Assist X2;Minimum assistance (up to cane in R hand) initial heavy posterior lean, vc's and tactile cues given.    Stand to Sit: Moderate assistance (for controlled into chair)  Bed to Chair: Minimum assistance;Assist X2        ADL  Grooming: Maximum assistance  UE Bathing: Maximum assistance (warm washcloth soap/water under armpits.)  UE Dressing: Maximum assistance (changing of gown)  LE Dressing: Dependent/Total (donning socks)  Toileting: Dependent/Total      Vision  Vision: Impaired  Vision Exceptions: Wears glasses for distance;Wears glasses at all times  Hearing  Hearing: Within functional limits    Cognition  Overall Cognitive Status: Exceptions  Following Commands: Follows multistep commands with increased time; Follows multistep commands with repitition  Attention Span: Appears intact  Memory: Appears intact  Safety Judgement: Decreased awareness of need for assistance;Decreased awareness of need for safety  Problem Solving: Decreased awareness of errors  Insights: Decreased awareness of deficits  Initiation: Does not require cues  Sequencing: Requires cues for some  Orientation  Overall Orientation Status: Within Functional Limits  Orientation Level: Oriented to person;Oriented to place;Oriented to situation;Oriented to time    Education Given To: Patient  Education Provided: Role of Therapy;Transfer Training;Equipment;Plan of Care; Fall Prevention Strategies;Precautions; ADL Adaptive Strategies  Education Method: Demonstration;Verbal  Barriers to Learning: Cognition  Education Outcome: Demonstrated understanding;Verbalized understanding;Continued education needed  Disease specific: Given patient's history of falls, patient educated on fall risks and prevention techniques, including: While in the hospital, CALL before you FALL; bed/chair alarms; wearing non-skid socks/shoes; having all needed items within reach, including nurse call light. Patient verbalized understanding.      LUE AROM (degrees)  LUE General AROM: hand/elbow WFL; shoulder not test 2* surgery  RUE AROM (degrees)  RUE AROM : WFL  RUE General AROM: limited shoulder flexion d/t h/o RTC tear      A/AROM Exercises: L elbow flexion/ext; L hand flexion/ext, pro/sup, digit flexion/ext x 10 reps        AM-PAC Score        AM-[facility] Inpatient Daily Activity Raw Score: 11 (01/21/23 1108)  AM-PAC Inpatient ADL T-Scale Score : 29.04 (01/21/23 1108)  ADL Inpatient CMS 0-100% Score: 70.42 (01/21/23 1108)  ADL Inpatient CMS G-Code Modifier : CL (01/21/23 1108)        Goals  Short Term Goals  Time Frame for Short Term Goals: 1 week 1/28  Short Term Goal 1: Pt will complete toilet transfers with CGA by 1/26  Short Term Goal 2: Pt will complete LE dressing with modA  Short Term Goal 3: Pt will complete standing level ADLs with CGA for balance  Patient Goals   Patient goals : \"to be able to walk w/o falling\"       Therapy Time   Individual Concurrent Group Co-treatment   Time In 0758         Time Out 0848         Minutes 50         Timed Code Treatment Minutes: 40 Minutes       Jeane Led, OTR/L  If pt is unable to be seen after this session, please let this note serve as discharge summary. Please see case management note for discharge disposition. Thank you.

## 2023-01-21 NOTE — PLAN OF CARE
Problem: Discharge Planning  Goal: Discharge to home or other facility with appropriate resources  Outcome: Progressing     Problem: Pain  Goal: Verbalizes/displays adequate comfort level or baseline comfort level  1/20/2023 2234 by Andrew Hall RN  Outcome: Progressing  1/20/2023 1506 by Vi Chand RN  Outcome: Progressing     Problem: Safety - Adult  Goal: Free from fall injury  1/20/2023 2234 by Andrew Hall RN  Outcome: Progressing  1/20/2023 1506 by Vi Chand RN  Outcome: Progressing     Problem: Skin/Tissue Integrity  Goal: Absence of new skin breakdown  Description: 1. Monitor for areas of redness and/or skin breakdown  2. Assess vascular access sites hourly  3. Every 4-6 hours minimum:  Change oxygen saturation probe site  4. Every 4-6 hours:  If on nasal continuous positive airway pressure, respiratory therapy assess nares and determine need for appliance change or resting period.   Outcome: Progressing

## 2023-01-21 NOTE — PROGRESS NOTES
Department of Orthopedic Surgery  Physician Assistant   Progress Note    Subjective:       Systemic or Specific Complaints:Feels better today. Pain controlled. Objective:     Patient Vitals for the past 24 hrs:   BP Temp Temp src Pulse Resp SpO2   01/21/23 0813 120/76 -- -- 74 -- 94 %   01/21/23 0757 -- -- -- -- -- 94 %   01/21/23 0041 136/81 98.6 °F (37 °C) Oral 84 17 93 %   01/20/23 2034 -- -- -- 89 16 94 %   01/20/23 2003 124/83 97.7 °F (36.5 °C) Oral 96 18 94 %   01/20/23 1559 104/61 98.3 °F (36.8 °C) Oral 87 18 94 %   01/20/23 1313 -- -- -- -- 16 --   01/20/23 1225 -- -- -- -- -- 93 %   01/20/23 1200 99/64 98.2 °F (36.8 °C) Oral 80 18 96 %   01/20/23 1145 96/62 98.2 °F (36.8 °C) Oral 84 18 94 %   01/20/23 1133 120/71 98.1 °F (36.7 °C) Oral 85 18 93 %         General: alert, appears stated age, and cooperative   Wound: Wound clean and dry no evidence of infection. , No Erythema, No Drainage, Wound Intact, and Positive for Edema   Motion: Painful range of Motion in affected extremity   DVT Exam: No evidence of DVT seen on physical exam.     Additional exam: Right forearm hematoma improved today. Moving all fingers, hand, wrist on the right forearm. Good radial pulse and sensation intact to touch. Left shoulder dressing c/d/I. Sensation intact to touch in the left arm with good radial pulse.     Data Review  CBC:   Lab Results   Component Value Date/Time    WBC 10.7 01/21/2023 09:56 AM    RBC 2.54 01/21/2023 09:56 AM    HGB 8.2 01/21/2023 09:56 AM    HCT 25.5 01/21/2023 09:56 AM     01/21/2023 09:56 AM       Renal:   Lab Results   Component Value Date/Time     01/21/2023 09:56 AM    K 3.8 01/21/2023 09:56 AM     01/21/2023 09:56 AM    CO2 20 01/21/2023 09:56 AM    BUN 32 01/21/2023 09:56 AM    CREATININE 1.4 01/21/2023 09:56 AM    GLUCOSE 124 01/21/2023 09:56 AM    CALCIUM 9.0 01/21/2023 09:56 AM        Trop: 0.28    Assessment:      left shoulder IM nail, right forearm hematoma, Acute blood loss anemia - expected after surgery . Plan:      1:  Continue ice, sling, pain control for the left shoulder. IM managing cardiac symptoms at this time. Dressing to the right forearm IV site and can continue with hand ROM and ice for swelling. Will take time for hematoma to reabsorb. FERMIN KAM. PT/OT as able.   2:  Continue Deep venous thrombosis prophylaxis  3:  Continue Pain Control    DELORIS Williamson

## 2023-01-21 NOTE — PROGRESS NOTES
[facility]     Cardiology                                     Progress Note    Admission date:  2023    Reason for follow up visit: tachycardia, elevated troponin     HPI/CC: Ruperto Mendiola was admitted on 2023 after a mechanical fall. He had a humerus dislocation and humeral head fracture and was taken to the OR. Cardiology consulted for tachycardia and elevated troponin. He was started on a heparin drip. Has also been treated for anemia. Rhythm has been sinus. Subjective: He complains of left arm pain from surgery. Denies chest pain, palpitations, shortness of breath, and dizziness. He reports he had a stress test at [place] around ten days ago. Vitals:  Blood pressure 120/76, pulse 74, temperature 98.6 °F (37 °C), temperature source Oral, resp. rate 17, height 5' 9\" (1.753 m), weight 170 lb (77.1 kg), SpO2 94 %.   Temp  Av.2 °F (36.8 °C)  Min: 97.7 °F (36.5 °C)  Max: 98.6 °F (37 °C)  Pulse  Av.9  Min: 74  Max: 96  BP  Min: 96/62  Max: 136/81  SpO2  Av.9 %  Min: 93 %  Max: 96 %    24 hour I/O    Intake/Output Summary (Last 24 hours) at 2023 0830  Last data filed at 2023 4089  Gross per 24 hour   Intake 480 ml   Output 1300 ml   Net -820 ml     Current Facility-Administered Medications   Medication Dose Route Frequency Provider Last Rate Last Admin    metoprolol (LOPRESSOR) injection 5 mg  5 mg IntraVENous Once DYLAN Weber CNP        0.9 % sodium chloride bolus  500 mL IntraVENous Once DYLAN Arnold CNP        buPROPion [facility]) tablet 100 mg  100 mg Oral Daily DYLAN Arnold CNP   100 mg at 23    carvedilol (COREG) tablet 12.5 mg  12.5 mg Oral BID DYLAN Arnold CNP   12.5 mg at 23    escitalopram (LEXAPRO) tablet 20 mg  20 mg Oral Daily DYLAN Arnold CNP   20 mg at 23 08    lisinopril (PRINIVIL;ZESTRIL) tablet 10 mg  10 mg Oral Daily DYLAN Arnold CNP   10 mg at 23 9038 pantoprazole (PROTONIX) tablet 40 mg  40 mg Oral QAM AC Singh Banegas, APRN - CNP   40 mg at 01/21/23 4298    heparin (porcine) injection 4,000 Units  4,000 Units IntraVENous PRN Katlyn Galvez MD        heparin (porcine) injection 2,000 Units  2,000 Units IntraVENous PRN Katlyn Galvez MD        heparin 25,000 unit in sodium chloride 0.45% 250 mL (premix) infusion  930 Units/hr IntraVENous Continuous Katlyn Galvez MD 9.3 mL/hr at 01/21/23 0625 930 Units/hr at 01/21/23 0625    0.9 % sodium chloride infusion   IntraVENous PRN Krissy Bryan MD        tamsulosin [facility]) capsule 0.4 mg  0.4 mg Oral Nightly Krissy Bryan MD        finasteride (PROSCAR) tablet 5 mg  5 mg Oral Daily Krissy Bryan MD        gabapentin (NEURONTIN) capsule 200 mg  200 mg Oral TID Krissy Bryan MD        fluticasone (FLOVENT HFA) 110 MCG/ACT inhaler 2 puff  2 puff Inhalation BID Krissy Bryan MD   2 puff at 01/21/23 9245    rosuvastatin (CRESTOR) tablet 40 mg  40 mg Oral QPM Krissy Bryan MD        perflutren lipid microspheres (DEFINITY) injection 1.5 mL  1.5 mL IntraVENous ONCE PRN Mile Vann MD        sodium chloride flush 0.9 % injection 5-40 mL  5-40 mL IntraVENous 2 times per day Taylor Garcia MD   10 mL at 01/20/23 2013    sodium chloride flush 0.9 % injection 5-40 mL  5-40 mL IntraVENous PRN Taylor Garcia MD        0.9 % sodium chloride infusion   IntraVENous PRN Taylor Garcia MD        ondansetron (ZOFRAN-ODT) disintegrating tablet 4 mg  4 mg Oral Q8H PRN Taylor Garcia MD        Or    ondansetron [facility]) injection 4 mg  4 mg IntraVENous Q6H PRN Taylor Garcia MD        traMADol Saida Sims) tablet 50 mg  50 mg Oral Q6H PRN Taylor Garcia MD   50 mg at 01/21/23 9119       Objective:     Telemetry monitor: SR    Physical Exam:  Constitutional and general appearance: alert, cooperative, no distress, and appears stated age  HEENT: PERRL, no cervical lymphadenopathy. No masses palpable.  Normal oral mucosa  Respiratory:  Normal excursion and expansion without use of accessory muscles  Resp auscultation: Normal breath sounds without wheezing, rhonchi, and rales  Cardiovascular: The apical impulse is not displaced  Heart tones are crisp and normal. regular S1 and S2.  Jugular venous pulsation Normal  The carotid upstroke is normal in amplitude and contour without delay or bruit  Peripheral pulses are symmetrical and full   Abdomen:  No masses or tenderness  Bowel sounds present  Extremities:   No cyanosis or clubbing   No lower extremity edema   Skin: warm and dry  Neurological:  Alert and oriented  Moves all extremities well  No abnormalities of mood, affect, memory, mentation, or behavior are noted    Data      Echo - 2019   Summary   Normal left ventricular systolic function with a visually estimated ejection   fraction of 55%. No regional wall motion abnormalities are seen. Grade I diastolic dysfunction with normal LV filling pressures. The right atrium is moderately enlarged. Mild aortic and mitral regurgitation. Moderate pulmonic regurgitation. Stress - 2019   Normal LV size and systolic function. Left ventricular ejection fraction of    68%. Normal wall motion. There is normal isotope uptake at stress and rest. There is no evidence of    myocardial ischemia or scar. All labs and testing reviewed.   Lab Review     Renal Profile:   Lab Results   Component Value Date/Time    CREATININE 1.6 01/20/2023 04:24 AM    BUN 33 01/20/2023 04:24 AM     01/20/2023 04:24 AM    K 4.5 01/20/2023 04:24 AM    K 4.0 07/12/2019 09:16 AM     01/20/2023 04:24 AM    CO2 20 01/20/2023 04:24 AM     CBC:    Lab Results   Component Value Date/Time    WBC 9.7 01/20/2023 04:24 AM    RBC 2.26 01/20/2023 04:24 AM    HGB 7.7 01/20/2023 05:36 PM    HCT 24.2 01/20/2023 05:36 PM    MCV 99.6 01/20/2023 04:24 AM    RDW 20.2 01/20/2023 04:24 AM     01/20/2023 04:24 AM     BNP:    Lab Results   Component Value Date/Time    BNP 60 03/18/2013 07:13 AM     Fasting Lipid Panel:    Lab Results   Component Value Date/Time    CHOL 156 07/12/2019 03:02 AM    HDL 48 07/12/2019 03:02 AM    TRIG 125 07/12/2019 03:02 AM     Cardiac Enzymes:  CK/MbTroponin  Lab Results   Component Value Date/Time    TROPONINI 0.29 01/20/2023 03:55 PM     PT/ INR   Lab Results   Component Value Date/Time    INR 1.02 03/18/2013 07:13 AM    PROTIME 11.6 03/18/2013 07:13 AM     PTT No results found for: PTT   Lab Results   Component Value Date/Time    MG 2.5 09/04/2010 04:25 AM      Lab Results   Component Value Date/Time    TSH 0.52 06/01/2011 12:00 PM       Assessment:  Sinus tachycardia: resolved   CAD   -reported remote stents    -recent stress at [facility] showed large fixed inferior wall defect   Elevated troponin: stable    -peak 0.29 (demand due to anemia?)  HTN: controlled   HLD  Asthma   Tobacco abuse   Anemia: received PRBC this admission    Plan:   1. Continue Coreg, lisinopril, and Crestor   2. Low suspicion for ACS. Recent stress test at [facility] showed large fixed defect of inferior wall felt to be consistent with old MI. If echo is normal, would not pursue any invasive ischemia evaluation. 3. Continue IV heparin for now. Stop after 48 hours. 4. Monitor on telemetry   5.  Echo pending       Refugia [address]JORN [address]  [phone]

## 2023-01-21 NOTE — PROGRESS NOTES
Physical Therapy  Facility/Department: [facility]  Physical Therapy Initial Assessment    Name: Gray Quintero  : 1940  MRN: [id number]  Date of Service: 2023    Discharge Recommendations:  Subacute/Skilled Nursing Facility   PT Equipment Recommendations  Equipment Needed: No  Other: defer to next level of care. Patient Diagnosis(es): There were no encounter diagnoses. Past Medical History:  has a past medical history of Asthma, CAD (coronary artery disease), HTN (hypertension), Hyperlipidemia, and Prostate cancer ([facility] [address] Past Surgical History:  has a past surgical history that includes knee surgery; Bunionectomy; eye surgery; joint replacement; Cardiac surgery; and Humerus fracture surgery (Left, 2023). Assessment   Body Structures, Functions, Activity Limitations Requiring Skilled Therapeutic Intervention: Decreased functional mobility ; Decreased body mechanics; Decreased strength;Decreased endurance;Decreased balance;Decreased posture; Increased pain  Assessment: pt seen in conjunction with OT to maximize pt safety and mobility. pt presents to [facility] with primary diagnosis of post operative pain, diagnosis of fall with subsequent L humerus fracture was also pertinent to this pt's treatment. PTA pt lived at home with 2 KAYLI with spouse, reports IND with bed mobility, transfers, and ambulation with rollator. currently pt requires up to MIN A + MOD A (A X2) for bed mobility, and MOD A X2 for functinoal transfers with SPC. pt demonstrates decreased strength, endurance, balance, activity tolerance, and functional mobility compared to baseline and would benefit from skilled PT to address the above stated deficits during his stay in the hospital.  recommend DC to SNF secondary to pt's notable deficits. Treatment Diagnosis: decreased strength, endurnace, balance, and mobility.   Therapy Prognosis: Good  Decision Making: Medium Complexity  Requires PT Follow-Up: Yes  Activity Tolerance  Activity Tolerance: Patient tolerated evaluation without incident;Patient limited by fatigue;Patient limited by endurance     Plan   Physcial Therapy Plan  General Plan: 3-5 times per week  Current Treatment Recommendations: Strengthening, Balance training, Functional mobility training, Transfer training, Endurance training, Gait training, Stair training, Neuromuscular re-education, Manual, Home exercise program, Safety education & training, Patient/Caregiver education & training, Equipment evaluation, education, & procurement, Modalities, Therapeutic activities  Safety Devices  Type of Devices: Call light within reach, Chair alarm in place, Left in chair, Gait belt, Nurse notified, All paul prominences offloaded  Restraints  Restraints Initially in Place: No     Restrictions  Restrictions/Precautions  Restrictions/Precautions: Fall Risk, Weight Bearing  Required Braces or Orthoses?: Yes  Upper Extremity Weight Bearing Restrictions  Left Upper Extremity Weight Bearing: Non Weight Bearing  Position Activity Restriction  Other position/activity restrictions: Per Saint Moose, MD Op Note on 1/19/23 Sling: May remove sling if resting in bed for comfort. Elbow range of motion is encouraged. May remove sling for shower. Recommend sleeping semiupright in recliner or elevated head of bed in order to rest arm in sling. Subjective   Pain: left shoulder, 8/10 currently, has recieved pain medication. General  Chart Reviewed: Yes  Patient assessed for rehabilitation services?: Yes  Family / Caregiver Present: No  Referring Practitioner: Saint Moose, MD  Referral Date : 01/19/23  Diagnosis: post operative pain, diagnosis of fall with subsequent L humerus fracture was also pertinent to this pt's treatment. Follows Commands: Within Functional Limits  Subjective  Subjective: pt agreeable to PT evaluation.          Social/Functional History  Social/Functional History  Lives With: Spouse  Type of Home: [facility] Layout: Two level (bedrooms are on second floor, has a chair lift to second floor aand finished.)  Home Access: Stairs to enter with rails  Entrance Stairs - Number of Steps: 2 KAYLI from garage with BHR  Entrance Stairs - Rails: Both  Bathroom Shower/Tub: Walk-in shower, Shower chair with back  Bathroom Toilet: Handicap height  Bathroom Equipment: Grab bars in shower, Shower chair  Home Equipment: Rollator, Cane (mostly uses rollator, stair lift to second floor and basement, adjustable bed,)  Has the patient had two or more falls in the past year or any fall with injury in the past year?: Yes (3 falls in past year, fall with fractured arm brought pt into hospital.)  ADL Assistance: Independent  Homemaking Assistance: Independent  Ambulation Assistance: Independent  Transfer Assistance: Independent  Active : No  Occupation: Retired  Leisure & Hobbies: likes to read. Vision/Hearing  Vision  Vision: Impaired  Vision Exceptions: Wears glasses for distance;Wears glasses at all times  Hearing  Hearing: Within functional limits    Cognition   Orientation  Overall Orientation Status: Within Functional Limits  Orientation Level: Oriented to person;Oriented to place;Oriented to situation;Oriented to time  Cognition  Overall Cognitive Status: Exceptions  Following Commands: Follows multistep commands with increased time; Follows multistep commands with repitition  Attention Span: Appears intact  Memory: Appears intact  Safety Judgement: Decreased awareness of need for assistance;Decreased awareness of need for safety  Problem Solving: Decreased awareness of errors  Insights: Decreased awareness of deficits  Initiation: Does not require cues  Sequencing: Requires cues for some     Objective   Heart Rate: 70  Heart Rate Source: Monitor  BP: (!) 103/59  BP Location: Right upper arm  Patient Position: Supine  MAP (Calculated): 74  Resp: 17  SpO2: 95 %  O2 Device: None (Room air)  Comment: HR 74 BPM, SPO2 94% on room air, BP 120/76     Observation/Palpation  Posture: Fair (with SPC)  Gross Assessment  AROM: Generally decreased, functional  PROM: Generally decreased, functional  Strength: Generally decreased, functional (4-/5 MMT score in all major muscle groups of BLE.)  Sensation: Intact (to light touch in B LE.)         Bed Mobility Training  Bed Mobility Training: Yes  Overall Level of Assistance: Assist X2;Moderate assistance  Supine to Sit: Moderate assistance;Assist X2  Sit to Supine: Other (comment) (JEFF, pt up in chair at end of evaluation.)  Scooting: Maximum assistance (to EOB.)  Balance  Sitting: Intact  Standing: Impaired  Standing - Static: Constant support  Standing - Dynamic: Constant support  Transfer Training  Transfer Training: Yes (with SPC in R hand.)  Overall Level of Assistance: Assist X2;Moderate assistance;Minimum assistance  Sit to Stand: Moderate assistance;Assist X2;Minimum assistance  Stand to Sit: Moderate assistance (with decreased eccentric control.)  Bed to Chair: Minimum assistance;Assist X2  Gait Training  Gait Training: No (pt performed ~3 steps to perform stand pivot transfer to chair from bed, unsteady on feet.)        -PAC Score  -PAC Inpatient Mobility Raw Score : 12 (01/21/23 1332)  -PAC Inpatient T-Scale Score : 35.33 (01/21/23 1332)  Mobility Inpatient CMS 0-100% Score: 68.66 (01/21/23 1332)  Mobility Inpatient CMS G-Code Modifier : CL (01/21/23 1332)        Goals  Short Term Goals  Time Frame for Short Term Goals: 7 days (2/28) unless otherwise stated. Short Term Goal 1: pt will demonstrate MIN A for bed mobility. Short Term Goal 2: pt will demonstrate MIN A for functional transfers with LRAD. Short Term Goal 3: pt will demonstrate MIN A for ambulation up to 22' with LRAD. Short Term Goal 4: pt will participate in 12-15 reps of BLE exercises to improve strength and endurance by 1/26. Patient Goals   Patient Goals : \"I want to get my pain under contorl and go home. \" Education  Patient Education  Education Given To: Patient  Education Provided: Role of Therapy;Plan of Care;Home Exercise Program;Fall Prevention Strategies;Transfer Training;Equipment  Education Provided Comments: educated pt on use of SPC for transfers, NWB status of LUE. pt repeatedly started to use LUE during transfers and required freqeuent VCs to mainatin NWB status. Education Method: Demonstration;Verbal  Barriers to Learning: None  Education Outcome: Verbalized understanding;Continued education needed      Therapy Time   Individual Concurrent Group Co-treatment   Time In 0758         Time Out 0845         Minutes 47         Timed Code Treatment Minutes: 32 Minutes, 15 minutes for initial evaluation. Diane Orbilly, PT, DPT  If pt is unable to be seen after this session, please let this note serve as discharge summary. Please see case management note for discharge disposition. Thank you.

## 2023-01-21 NOTE — PROGRESS NOTES
Hospitalist Progress Note      PCP: Ora Nugent MD    Date of Admission: 1/19/2023    Chief Complaint: left should pain    Hospital Course:   79 y.o. male, s/p intramedullary nailing of the left proximal humerus fracture, who we are asked to see/evaluate by Miranda Martínez MD for medical management of hypertension and tachycardia. The patient is doing well postoperatively. He is resting quietly in bed. The patient stated depending on how he moves his arm he has 7/10 pain. He was recently medicated with tramadol. This may be contributing to his tachycardia. The patient was also hypertensive upon arriving to the floor. He is now normotensive. His home medications have been reordered. The patient stated he is taking p.o. well. He denies any comments, questions and or concerns at this time. Subjective: shoulder pain well controlled. Denies chest pain. Remains on heparin gtt.        Medications:  Reviewed    Infusion Medications    heparin (PORCINE) Infusion 930 Units/hr (01/21/23 0625)    sodium chloride      sodium chloride       Scheduled Medications    metoprolol  5 mg IntraVENous Once    sodium chloride  500 mL IntraVENous Once    buPROPion  100 mg Oral Daily    carvedilol  12.5 mg Oral BID    escitalopram  20 mg Oral Daily    lisinopril  10 mg Oral Daily    pantoprazole  40 mg Oral QAM AC    tamsulosin  0.4 mg Oral Nightly    finasteride  5 mg Oral Daily    gabapentin  200 mg Oral TID    fluticasone  2 puff Inhalation BID    rosuvastatin  40 mg Oral QPM    sodium chloride flush  5-40 mL IntraVENous 2 times per day     PRN Meds: heparin (porcine), heparin (porcine), sodium chloride, perflutren lipid microspheres, sodium chloride flush, sodium chloride, ondansetron **OR** ondansetron, traMADol      Intake/Output Summary (Last 24 hours) at 1/21/2023 1247  Last data filed at 1/21/2023 1241  Gross per 24 hour   Intake 960 ml   Output 1000 ml   Net -40 ml       Physical Exam Performed:    BP (!) 103/59 Pulse 70   Temp 98.4 °F (36.9 °C) (Axillary)   Resp 17   Ht 5' 9\" (1.753 m)   Wt 170 lb (77.1 kg)   SpO2 95%   BMI 25.10 kg/m²     General appearance: Pleasant, elderly male in no apparent distress, appears stated age and cooperative. HEENT: Pupils equal, round, and reactive to light. Extra ocular muscles intact. Conjunctivae/corneas clear. Neck: Supple, with full range of motion. No jugular venous distention. Trachea midline. Respiratory:  Normal respiratory effort. Clear to auscultation, bilaterally without Rales/Wheezes/Rhonchi. Cardiovascular: Regular rate and rhythm with normal S1/S2 without murmurs, rubs or gallops. Abdomen: Soft, non-tender, non-distended with normal bowel sounds. Musculoskeletal:  No clubbing, cyanosis or edema bilaterally. Decreased ROM left arm related to surgery  Skin: Skin color, texture, turgor normal.  OR dressing clean dry and intact,. Sling in place  Neurologic:  Neurovascularly intact  Cranial nerves: II-XII intact, grossly non-focal.  Psychiatric: Alert and oriented, thought content appropriate, normal insight  Capillary Refill: Brisk,3 seconds, normal   Peripheral Pulses: +2 palpable, equal bilaterally       Labs:   Recent Labs     01/20/23  0424 01/20/23  1736 01/21/23  0956   WBC 9.7  --  10.7   HGB 7.4* 7.7* 8.2*   HCT 22.5* 24.2* 25.5*     --  359     Recent Labs     01/19/23  1104 01/20/23  0424 01/21/23  0956   * 132* 133*   K 4.1 4.5 3.8    102 101   CO2 21 20* 20*   BUN 33* 33* 32*   CREATININE 1.5* 1.6* 1.4*   CALCIUM 9.4 9.1 9.0     No results for input(s): AST, ALT, BILIDIR, BILITOT, ALKPHOS in the last 72 hours. No results for input(s): INR in the last 72 hours.   Recent Labs     01/20/23  1155 01/20/23  1555 01/21/23  0956   TROPONINI 0.28* 0.29* 0.22*       Urinalysis:      Lab Results   Component Value Date/Time    NITRU Negative 01/20/2023 02:00 AM    WBCUA 3-5 01/20/2023 02:00 AM    BACTERIA 1+ 07/13/2019 12:10 AM    RBCUA 0-2 01/20/2023 02:00 AM    BLOODU Negative 01/20/2023 02:00 AM    SPECGRAV 1.010 01/20/2023 02:00 AM    GLUCOSEU Negative 01/20/2023 02:00 AM    GLUCOSEU NEGATIVE 09/02/2010 03:45 PM       Radiology:  XR HUMERUS LEFT (MIN 2 VIEWS)   Preliminary Result   Intraprocedural fluoroscopic spot images as above. See separate procedure   report for more information. FLUORO FOR SURGICAL PROCEDURES   Final Result          IP CONSULT TO HOSPITALIST  IP CONSULT TO CARDIOLOGY  IP CONSULT TO PHARMACY    Assessment/Plan:    Active Hospital Problems    Diagnosis     NSTEMI (non-ST elevated myocardial infarction) ([facility] [address] [I21.4]      Priority: Medium    Tachycardia [R00.0]      Priority: Medium    Post-operative pain [G89.18]      Priority: Medium    HTN (hypertension) [I10]     CAD (coronary artery disease) [I25.10]      Elevated troponin, known CAD  - cardiology consulted  - trend troponins  - EKG stable  - recent outpatient stress test with high risk study indicating likely fixed defect  - continue heparin gtt until tomorrow morning  - continue statin, coreg     Humeral fracture  - s/p repair  - post-op care per surgery  - pain control ordered     Anemia  - unclear baseline  - expected post-op blood loss. No additional blood loss noted  - s/p 1u pRBCs  - continue to monitor    CKD  - Unclear baseline but not on history from OSH  - continue to monitor     HTN  - poorly controlled  - continue coreg, lisinopril     HLD  - continue statin     Asthma  - no evidence of exacerbation  - continue home inhalers     BPH  - continue proscar, flomax     Depression  - mood stable  - continue lexapro, wellbutrin    DVT Prophylaxis: lovenox  Diet: ADULT DIET;  Regular  Code Status: Full Code  PT/OT Eval Status: ordered    Dispo - SNF in 1-2 days    Appropriate for A1 Discharge Unit: No      Nemo Colmenares MD

## 2023-01-22 LAB — ANTI-XA UNFRAC HEPARIN: 0.38 IU/ML (ref 0.3–0.7)

## 2023-01-22 PROCEDURE — 6370000000 HC RX 637 (ALT 250 FOR IP): Performed by: ORTHOPAEDIC SURGERY

## 2023-01-22 PROCEDURE — 6370000000 HC RX 637 (ALT 250 FOR IP): Performed by: INTERNAL MEDICINE

## 2023-01-22 PROCEDURE — 2500000003 HC RX 250 WO HCPCS: Performed by: INTERNAL MEDICINE

## 2023-01-22 PROCEDURE — 94640 AIRWAY INHALATION TREATMENT: CPT

## 2023-01-22 PROCEDURE — 99232 SBSQ HOSP IP/OBS MODERATE 35: CPT | Performed by: NURSE PRACTITIONER

## 2023-01-22 PROCEDURE — 2580000003 HC RX 258: Performed by: ORTHOPAEDIC SURGERY

## 2023-01-22 PROCEDURE — 2060000000 HC ICU INTERMEDIATE R&B

## 2023-01-22 PROCEDURE — 36415 COLL VENOUS BLD VENIPUNCTURE: CPT

## 2023-01-22 PROCEDURE — 6370000000 HC RX 637 (ALT 250 FOR IP): Performed by: NURSE PRACTITIONER

## 2023-01-22 PROCEDURE — 85520 HEPARIN ASSAY: CPT

## 2023-01-22 RX ADMIN — BUPROPION HYDROCHLORIDE 100 MG: 100 TABLET, FILM COATED ORAL at 09:18

## 2023-01-22 RX ADMIN — SODIUM CHLORIDE, PRESERVATIVE FREE 10 ML: 5 INJECTION INTRAVENOUS at 20:13

## 2023-01-22 RX ADMIN — PANTOPRAZOLE SODIUM 40 MG: 40 TABLET, DELAYED RELEASE ORAL at 05:27

## 2023-01-22 RX ADMIN — ESCITALOPRAM OXALATE 20 MG: 10 TABLET ORAL at 09:18

## 2023-01-22 RX ADMIN — SODIUM CHLORIDE, PRESERVATIVE FREE 10 ML: 5 INJECTION INTRAVENOUS at 09:19

## 2023-01-22 RX ADMIN — FINASTERIDE 5 MG: 5 TABLET, FILM COATED ORAL at 09:18

## 2023-01-22 RX ADMIN — CARVEDILOL 12.5 MG: 6.25 TABLET, FILM COATED ORAL at 20:00

## 2023-01-22 RX ADMIN — LISINOPRIL 10 MG: 10 TABLET ORAL at 09:19

## 2023-01-22 RX ADMIN — CARVEDILOL 12.5 MG: 6.25 TABLET, FILM COATED ORAL at 09:19

## 2023-01-22 RX ADMIN — ROSUVASTATIN CALCIUM 40 MG: 10 TABLET, FILM COATED ORAL at 17:35

## 2023-01-22 RX ADMIN — GABAPENTIN 200 MG: 100 CAPSULE ORAL at 09:19

## 2023-01-22 RX ADMIN — TRAMADOL HYDROCHLORIDE 50 MG: 50 TABLET ORAL at 20:25

## 2023-01-22 RX ADMIN — GABAPENTIN 200 MG: 100 CAPSULE ORAL at 14:55

## 2023-01-22 RX ADMIN — Medication 2 PUFF: at 20:03

## 2023-01-22 RX ADMIN — GABAPENTIN 200 MG: 100 CAPSULE ORAL at 20:01

## 2023-01-22 RX ADMIN — TRAMADOL HYDROCHLORIDE 50 MG: 50 TABLET ORAL at 05:27

## 2023-01-22 RX ADMIN — TRAMADOL HYDROCHLORIDE 50 MG: 50 TABLET ORAL at 14:55

## 2023-01-22 RX ADMIN — HEPARIN SODIUM 930 UNITS/HR: 10000 INJECTION, SOLUTION INTRAVENOUS at 05:35

## 2023-01-22 ASSESSMENT — PAIN DESCRIPTION - ONSET
ONSET: ON-GOING

## 2023-01-22 ASSESSMENT — PAIN DESCRIPTION - LOCATION
LOCATION: SHOULDER
LOCATION: SHOULDER
LOCATION: ARM
LOCATION: SHOULDER
LOCATION: SHOULDER

## 2023-01-22 ASSESSMENT — PAIN SCALES - GENERAL
PAINLEVEL_OUTOF10: 7
PAINLEVEL_OUTOF10: 0
PAINLEVEL_OUTOF10: 6
PAINLEVEL_OUTOF10: 8
PAINLEVEL_OUTOF10: 0
PAINLEVEL_OUTOF10: 9
PAINLEVEL_OUTOF10: 0
PAINLEVEL_OUTOF10: 0
PAINLEVEL_OUTOF10: 6

## 2023-01-22 ASSESSMENT — PAIN DESCRIPTION - ORIENTATION
ORIENTATION: LEFT

## 2023-01-22 ASSESSMENT — PAIN DESCRIPTION - DESCRIPTORS
DESCRIPTORS: ACHING;DISCOMFORT

## 2023-01-22 ASSESSMENT — PAIN DESCRIPTION - FREQUENCY
FREQUENCY: INTERMITTENT

## 2023-01-22 ASSESSMENT — PAIN DESCRIPTION - PAIN TYPE
TYPE: SURGICAL PAIN

## 2023-01-22 ASSESSMENT — PAIN - FUNCTIONAL ASSESSMENT
PAIN_FUNCTIONAL_ASSESSMENT: PREVENTS OR INTERFERES SOME ACTIVE ACTIVITIES AND ADLS

## 2023-01-22 NOTE — PROGRESS NOTES
Hospitalist Progress Note      PCP: Ora Nugent MD    Date of Admission: 1/19/2023    Chief Complaint: left should pain    Hospital Course:   79 y.o. male, s/p intramedullary nailing of the left proximal humerus fracture, who we are asked to see/evaluate by Miranda Martínez MD for medical management of hypertension and tachycardia. The patient is doing well postoperatively. He is resting quietly in bed. The patient stated depending on how he moves his arm he has 7/10 pain. He was recently medicated with tramadol. This may be contributing to his tachycardia. The patient was also hypertensive upon arriving to the floor. He is now normotensive. His home medications have been reordered. The patient stated he is taking p.o. well. He denies any comments, questions and or concerns at this time. Subjective: shoulder pain well controlled. Denies chest pain. Remains on heparin gtt.        Medications:  Reviewed    Infusion Medications    sodium chloride      sodium chloride       Scheduled Medications    metoprolol  5 mg IntraVENous Once    sodium chloride  500 mL IntraVENous Once    buPROPion  100 mg Oral Daily    carvedilol  12.5 mg Oral BID    escitalopram  20 mg Oral Daily    lisinopril  10 mg Oral Daily    pantoprazole  40 mg Oral QAM AC    tamsulosin  0.4 mg Oral Nightly    finasteride  5 mg Oral Daily    gabapentin  200 mg Oral TID    fluticasone  2 puff Inhalation BID    rosuvastatin  40 mg Oral QPM    sodium chloride flush  5-40 mL IntraVENous 2 times per day     PRN Meds: sodium chloride, perflutren lipid microspheres, sodium chloride flush, sodium chloride, ondansetron **OR** ondansetron, traMADol      Intake/Output Summary (Last 24 hours) at 1/22/2023 0904  Last data filed at 1/22/2023 5200  Gross per 24 hour   Intake 480 ml   Output 600 ml   Net -120 ml       Physical Exam Performed:    /78   Pulse 78   Temp 98.1 °F (36.7 °C) (Oral)   Resp 16   Ht 5' 9\" (1.753 m)   Wt 170 lb (77.1 kg) SpO2 94%   BMI 25.10 kg/m²     General appearance: Pleasant, elderly male in no apparent distress, appears stated age and cooperative. HEENT: Pupils equal, round, and reactive to light. Extra ocular muscles intact. Conjunctivae/corneas clear. Neck: Supple, with full range of motion. No jugular venous distention. Trachea midline. Respiratory:  Normal respiratory effort. Clear to auscultation, bilaterally without Rales/Wheezes/Rhonchi. Cardiovascular: Regular rate and rhythm with normal S1/S2 without murmurs, rubs or gallops. Abdomen: Soft, non-tender, non-distended with normal bowel sounds. Musculoskeletal:  No clubbing, cyanosis or edema bilaterally. Decreased ROM left arm related to surgery  Skin: Skin color, texture, turgor normal.  OR dressing clean dry and intact,. Sling in place  Neurologic:  Neurovascularly intact  Cranial nerves: II-XII intact, grossly non-focal.  Psychiatric: Alert and oriented, thought content appropriate, normal insight  Capillary Refill: Brisk,3 seconds, normal   Peripheral Pulses: +2 palpable, equal bilaterally       Labs:   Recent Labs     01/20/23  0424 01/20/23  1736 01/21/23  0956   WBC 9.7  --  10.7   HGB 7.4* 7.7* 8.2*   HCT 22.5* 24.2* 25.5*     --  359     Recent Labs     01/19/23  1104 01/20/23  0424 01/21/23  0956   * 132* 133*   K 4.1 4.5 3.8    102 101   CO2 21 20* 20*   BUN 33* 33* 32*   CREATININE 1.5* 1.6* 1.4*   CALCIUM 9.4 9.1 9.0     No results for input(s): AST, ALT, BILIDIR, BILITOT, ALKPHOS in the last 72 hours. No results for input(s): INR in the last 72 hours.   Recent Labs     01/20/23  1155 01/20/23  1555 01/21/23  0956   TROPONINI 0.28* 0.29* 0.22*       Urinalysis:      Lab Results   Component Value Date/Time    NITRU Negative 01/20/2023 02:00 AM    WBCUA 3-5 01/20/2023 02:00 AM    BACTERIA 1+ 07/13/2019 12:10 AM    RBCUA 0-2 01/20/2023 02:00 AM    BLOODU Negative 01/20/2023 02:00 AM    SPECGRAV 1.010 01/20/2023 02:00 AM GLUCOSEU Negative 01/20/2023 02:00 AM    GLUCOSEU NEGATIVE 09/02/2010 03:45 PM       Radiology:  XR HUMERUS LEFT (MIN 2 VIEWS)   Preliminary Result   Intraprocedural fluoroscopic spot images as above. See separate procedure   report for more information. FLUORO FOR SURGICAL PROCEDURES   Final Result          IP CONSULT TO HOSPITALIST  IP CONSULT TO CARDIOLOGY  IP CONSULT TO PHARMACY    Assessment/Plan:    Active Hospital Problems    Diagnosis     NSTEMI (non-ST elevated myocardial infarction) ([facility] [address] [I21.4]      Priority: Medium    Tachycardia [R00.0]      Priority: Medium    Post-operative pain [G89.18]      Priority: Medium    HTN (hypertension) [I10]     CAD (coronary artery disease) [I25.10]      Elevated troponin, known CAD  - cardiology consulted  - trend troponins  - EKG stable  - recent outpatient stress test with high risk study indicating likely fixed defect  - s/p heparin gtt  - continue statin, coreg     Humeral fracture  - s/p repair  - post-op care per surgery  - pain control ordered     Anemia  - unclear baseline  - expected post-op blood loss. No additional blood loss noted  - s/p 1u pRBCs  - continue to monitor    CKD  - Unclear baseline but not on history from OSH  - continue to monitor     HTN  - poorly controlled  - continue coreg, lisinopril     HLD  - continue statin     Asthma  - no evidence of exacerbation  - continue home inhalers     BPH  - continue proscar, flomax     Depression  - mood stable  - continue lexapro, wellbutrin    DVT Prophylaxis: lovenox  Diet: ADULT DIET; Regular  Code Status: Full Code  PT/OT Eval Status: ordered    Dispo - Per ortho.  No medical barriers to discharge    Appropriate for A1 Discharge Unit: No      Hien Henderson MD

## 2023-01-22 NOTE — CARE COORDINATION
Faxed paper referral to [phone] for [facility]    Addendum: [facility] can accept pt. Precert started on joshua site.

## 2023-01-22 NOTE — PROGRESS NOTES
Department of Orthopedic Surgery  Physician Assistant   Progress Note    Subjective:       Systemic or Specific Complaints: Pain controlled.    Objective:     Patient Vitals for the past 24 hrs:   BP Temp Temp src Pulse Resp SpO2   01/22/23 0808 131/78 98.1 °F (36.7 °C) Oral 78 16 94 %   01/22/23 0530 (!) 146/76 98.4 °F (36.9 °C) Oral 73 18 93 %   01/22/23 0036 131/77 98.6 °F (37 °C) Oral 71 15 95 %   01/21/23 2205 -- -- -- -- 16 --   01/21/23 2027 139/71 98.1 °F (36.7 °C) Oral 79 18 97 %   01/21/23 1933 -- -- -- -- -- 93 %   01/21/23 1655 124/73 98.1 °F (36.7 °C) -- 75 18 98 %   01/21/23 1518 -- -- -- -- 16 --   01/21/23 1239 (!) 103/59 98.4 °F (36.9 °C) Axillary 70 17 95 %         General: alert, appears stated age, and cooperative   Wound: Wound clean and dry no evidence of infection., No Erythema, No Drainage, Wound Intact, and Positive for Edema   Motion: Painful range of Motion in affected extremity   DVT Exam: No evidence of DVT seen on physical exam.     Additional exam: Right forearm hematoma improved today.  Moving all fingers, hand, wrist on the right forearm.  Good radial pulse and sensation intact to touch.    Left shoulder dressing c/d/I.  Sensation intact to touch in the left arm with good radial pulse.    Data Review  CBC:   Lab Results   Component Value Date/Time    WBC 10.7 01/21/2023 09:56 AM    RBC 2.54 01/21/2023 09:56 AM    HGB 8.2 01/21/2023 09:56 AM    HCT 25.5 01/21/2023 09:56 AM     01/21/2023 09:56 AM       Renal:   Lab Results   Component Value Date/Time     01/21/2023 09:56 AM    K 3.8 01/21/2023 09:56 AM     01/21/2023 09:56 AM    CO2 20 01/21/2023 09:56 AM    BUN 32 01/21/2023 09:56 AM    CREATININE 1.4 01/21/2023 09:56 AM    GLUCOSE 124 01/21/2023 09:56 AM    CALCIUM 9.0 01/21/2023 09:56 AM        Trop: 0.28    Assessment:      left shoulder IM nail, right forearm hematoma, Acute blood loss anemia - expected after surgery and improving.     Plan:      1:  Continue  ice, sling, pain control for the left shoulder. IM managing cardiac symptoms at this time. Dressing to the right forearm IV site and can continue with hand ROM and ice for swelling. Will take time for hematoma to reabsorb. FERMIN KAM. PT/OT as able. Plan d/c once precert obtained.   2:  Continue Deep venous thrombosis prophylaxis  3:  Continue Pain Control    DELORIS Haddad

## 2023-01-22 NOTE — CARE COORDINATION
Spoke to pt spouse re: SNF recs. [facility]ing [facility]. Next choices 2) [facility] 3) [facility] 4) [facility]. Referral placed in Epic and left vm with liam Ellsworth.

## 2023-01-22 NOTE — PROGRESS NOTES
[address]     Cardiology                                     Progress Note    Admission date:  2023    Reason for follow up visit: tachycardia, elevated troponin     HPI/CC: Abelardo Li was admitted on 2023 after a mechanical fall. He had a humerus dislocation and humeral head fracture and was taken to the OR. Cardiology consulted for tachycardia and elevated troponin. He was started on a heparin drip. Has also been treated for anemia. Echo showed an EF of 50-55%. Rhythm has been sinus. Subjective: He complains of ongoing left arm pain from surgery. He has no cardiac complaints. Denies chest pain, palpitations, SOB or dizziness. Vitals:  Blood pressure 131/78, pulse 78, temperature 98.1 °F (36.7 °C), temperature source Oral, resp. rate 16, height 5' 9\" (1.753 m), weight 170 lb (77.1 kg), SpO2 94 %.   Temp  Av.3 °F (36.8 °C)  Min: 98.1 °F (36.7 °C)  Max: 98.6 °F (37 °C)  Pulse  Av.3  Min: 70  Max: 79  BP  Min: 103/59  Max: 146/76  SpO2  Av %  Min: 93 %  Max: 98 %    24 hour I/O    Intake/Output Summary (Last 24 hours) at 2023 1017  Last data filed at 2023 6318  Gross per 24 hour   Intake 720 ml   Output 1050 ml   Net -330 ml       Current Facility-Administered Medications   Medication Dose Route Frequency Provider Last Rate Last Admin    metoprolol (LOPRESSOR) injection 5 mg  5 mg IntraVENous Once DYLAN Weber - CNP        0.9 % sodium chloride bolus  500 mL IntraVENous Once DYLAN Fitzgerald - CNP        buPROPion [facility]) tablet 100 mg  100 mg Oral Daily DYLAN Fitzgerald - CNP   100 mg at 23 0918    carvedilol (COREG) tablet 12.5 mg  12.5 mg Oral BID DYLAN Fitzgerald - CNP   12.5 mg at 23 0919    escitalopram (LEXAPRO) tablet 20 mg  20 mg Oral Daily DYLAN Fitzgerald - CNP   20 mg at 23 0918    lisinopril (PRINIVIL;ZESTRIL) tablet 10 mg  10 mg Oral Daily DYLAN Fitzgerald CNP   10 mg at 23 6695 pantoprazole (PROTONIX) tablet 40 mg  40 mg Oral QAM AC Demetrio Rojas, APRN - CNP   40 mg at 01/22/23 0527    0.9 % sodium chloride infusion   IntraVENous PRN Aicha Hicks MD        tamsulosin [facility]) capsule 0.4 mg  0.4 mg Oral Nightly Aicha Hicks MD        finasteride (PROSCAR) tablet 5 mg  5 mg Oral Daily Aicha Hicks MD   5 mg at 01/22/23 8189    gabapentin (NEURONTIN) capsule 200 mg  200 mg Oral TID Aicha Hicks MD   200 mg at 01/22/23 0919    fluticasone (FLOVENT HFA) 110 MCG/ACT inhaler 2 puff  2 puff Inhalation BID Aicha Hicks MD   2 puff at 01/21/23 1931    rosuvastatin (CRESTOR) tablet 40 mg  40 mg Oral QPM Aicha Hicks MD   40 mg at 01/21/23 1739    perflutren lipid microspheres (DEFINITY) injection 1.5 mL  1.5 mL IntraVENous ONCE PRN Modesto Mari MD        sodium chloride flush 0.9 % injection 5-40 mL  5-40 mL IntraVENous 2 times per day Terell Peacock MD   10 mL at 01/22/23 0919    sodium chloride flush 0.9 % injection 5-40 mL  5-40 mL IntraVENous PRN Terell Peacock MD        0.9 % sodium chloride infusion   IntraVENous PRN Terell Peacock MD        ondansetron (ZOFRAN-ODT) disintegrating tablet 4 mg  4 mg Oral Q8H PRN Terell Peacock MD        Or    ondansetron [facility]) injection 4 mg  4 mg IntraVENous Q6H PRN Terell Peacock MD        traMADol [facility]) tablet 50 mg  50 mg Oral Q6H PRN Terell Peacock MD   50 mg at 01/22/23 5836       Objective:     Telemetry monitor: SR    Physical Exam:  Constitutional and general appearance: alert, cooperative, no distress, and appears stated age  HEENT: PERRL, no cervical lymphadenopathy. No masses palpable. Normal oral mucosa  Respiratory:  Normal excursion and expansion without use of accessory muscles  Resp auscultation: Normal breath sounds without wheezing, rhonchi, and rales  Cardiovascular:   The apical impulse is not displaced  Heart tones are crisp and normal. regular S1 and S2.  Jugular venous pulsation Normal  The carotid upstroke is normal in amplitude and contour without delay or bruit  Peripheral pulses are symmetrical and full   Abdomen:  No masses or tenderness  Bowel sounds present  Extremities:   No cyanosis or clubbing   No lower extremity edema   Skin: warm and dry  Neurological:  Alert and oriented  Moves all extremities well  No abnormalities of mood, affect, memory, mentation, or behavior are noted    Data      Echo - 2019   Summary   Normal left ventricular systolic function with a visually estimated ejection   fraction of 55%. No regional wall motion abnormalities are seen. Grade I diastolic dysfunction with normal LV filling pressures. The right atrium is moderately enlarged. Mild aortic and mitral regurgitation. Moderate pulmonic regurgitation. Stress - 2019   Normal LV size and systolic function. Left ventricular ejection fraction of    68%. Normal wall motion. There is normal isotope uptake at stress and rest. There is no evidence of    myocardial ischemia or scar. All labs and testing reviewed.   Lab Review     Renal Profile:   Lab Results   Component Value Date/Time    CREATININE 1.4 01/21/2023 09:56 AM    BUN 32 01/21/2023 09:56 AM     01/21/2023 09:56 AM    K 3.8 01/21/2023 09:56 AM     01/21/2023 09:56 AM    CO2 20 01/21/2023 09:56 AM     CBC:    Lab Results   Component Value Date/Time    WBC 10.7 01/21/2023 09:56 AM    RBC 2.54 01/21/2023 09:56 AM    HGB 8.2 01/21/2023 09:56 AM    HCT 25.5 01/21/2023 09:56 AM    .6 01/21/2023 09:56 AM    RDW 19.6 01/21/2023 09:56 AM     01/21/2023 09:56 AM     BNP:    Lab Results   Component Value Date/Time    BNP 60 03/18/2013 07:13 AM     Fasting Lipid Panel:    Lab Results   Component Value Date/Time    CHOL 106 01/21/2023 06:22 AM    HDL 45 01/21/2023 06:22 AM    TRIG 86 01/21/2023 06:22 AM     Cardiac Enzymes:  CK/MbTroponin  Lab Results   Component Value Date/Time    TROPONINI 0.22 01/21/2023 09:56 AM     PT/ INR   Lab Results   Component Value Date/Time    INR 1.02 03/18/2013 07:13 AM    PROTIME 11.6 03/18/2013 07:13 AM     PTT No results found for: PTT   Lab Results   Component Value Date/Time    MG 2.5 09/04/2010 04:25 AM      Lab Results   Component Value Date/Time    TSH 0.52 06/01/2011 12:00 PM       Assessment:  Sinus tachycardia: resolved   CAD   -reported remote stents    -recent stress at [facility] showed large fixed inferior wall defect   Elevated troponin: stable    -peak 0.29 (demand due to anemia?)  HTN: controlled   HLD  Asthma   Tobacco abuse   Anemia: received PRBC this admission    Plan:   1. Continue Coreg, lisinopril, and Crestor   2. Echo overall normal. Patient to follow up with primary cardiology team.   3. Nothing further from a cardiology standpoint. We will sign off but remain available if needed.        Bogdan Campbell, APRN-CNP  [address]  [phone]

## 2023-01-23 VITALS
HEIGHT: 69 IN | RESPIRATION RATE: 18 BRPM | BODY MASS INDEX: 24.82 KG/M2 | SYSTOLIC BLOOD PRESSURE: 95 MMHG | WEIGHT: 167.55 LBS | OXYGEN SATURATION: 94 % | TEMPERATURE: 98.2 F | HEART RATE: 78 BPM | DIASTOLIC BLOOD PRESSURE: 54 MMHG

## 2023-01-23 LAB
HCT VFR BLD CALC: 23.2 % (ref 40.5–52.5)
HEMOGLOBIN: 7.7 G/DL (ref 13.5–17.5)
SARS-COV-2, NAAT: NOT DETECTED

## 2023-01-23 PROCEDURE — 6370000000 HC RX 637 (ALT 250 FOR IP): Performed by: ORTHOPAEDIC SURGERY

## 2023-01-23 PROCEDURE — 6370000000 HC RX 637 (ALT 250 FOR IP): Performed by: INTERNAL MEDICINE

## 2023-01-23 PROCEDURE — 85014 HEMATOCRIT: CPT

## 2023-01-23 PROCEDURE — 2580000003 HC RX 258: Performed by: ORTHOPAEDIC SURGERY

## 2023-01-23 PROCEDURE — 87635 SARS-COV-2 COVID-19 AMP PRB: CPT

## 2023-01-23 PROCEDURE — 85018 HEMOGLOBIN: CPT

## 2023-01-23 PROCEDURE — APPNB60 APP NON BILLABLE TIME 46-60 MINS: Performed by: SPECIALIST/TECHNOLOGIST

## 2023-01-23 PROCEDURE — 36415 COLL VENOUS BLD VENIPUNCTURE: CPT

## 2023-01-23 PROCEDURE — 99024 POSTOP FOLLOW-UP VISIT: CPT | Performed by: SPECIALIST/TECHNOLOGIST

## 2023-01-23 PROCEDURE — 94640 AIRWAY INHALATION TREATMENT: CPT

## 2023-01-23 PROCEDURE — 6370000000 HC RX 637 (ALT 250 FOR IP): Performed by: NURSE PRACTITIONER

## 2023-01-23 RX ORDER — FINASTERIDE 5 MG/1
5 TABLET, FILM COATED ORAL DAILY
Qty: 30 TABLET | Refills: 3 | DISCHARGE
Start: 2023-01-24

## 2023-01-23 RX ORDER — TRAMADOL HYDROCHLORIDE 50 MG/1
50 TABLET ORAL EVERY 6 HOURS PRN
Qty: 12 TABLET | Refills: 0 | Status: SHIPPED | OUTPATIENT
Start: 2023-01-23 | End: 2023-01-26

## 2023-01-23 RX ORDER — TAMSULOSIN HYDROCHLORIDE 0.4 MG/1
0.4 CAPSULE ORAL NIGHTLY
Qty: 30 CAPSULE | Refills: 3 | DISCHARGE
Start: 2023-01-23

## 2023-01-23 RX ADMIN — FINASTERIDE 5 MG: 5 TABLET, FILM COATED ORAL at 08:19

## 2023-01-23 RX ADMIN — CARVEDILOL 12.5 MG: 6.25 TABLET, FILM COATED ORAL at 08:18

## 2023-01-23 RX ADMIN — BUPROPION HYDROCHLORIDE 100 MG: 100 TABLET, FILM COATED ORAL at 08:19

## 2023-01-23 RX ADMIN — LISINOPRIL 10 MG: 10 TABLET ORAL at 08:18

## 2023-01-23 RX ADMIN — GABAPENTIN 200 MG: 100 CAPSULE ORAL at 08:18

## 2023-01-23 RX ADMIN — Medication 2 PUFF: at 07:37

## 2023-01-23 RX ADMIN — ESCITALOPRAM OXALATE 20 MG: 10 TABLET ORAL at 08:19

## 2023-01-23 RX ADMIN — TRAMADOL HYDROCHLORIDE 50 MG: 50 TABLET ORAL at 10:42

## 2023-01-23 RX ADMIN — GABAPENTIN 200 MG: 100 CAPSULE ORAL at 14:07

## 2023-01-23 RX ADMIN — SODIUM CHLORIDE, PRESERVATIVE FREE 10 ML: 5 INJECTION INTRAVENOUS at 08:19

## 2023-01-23 RX ADMIN — PANTOPRAZOLE SODIUM 40 MG: 40 TABLET, DELAYED RELEASE ORAL at 06:30

## 2023-01-23 RX ADMIN — TRAMADOL HYDROCHLORIDE 50 MG: 50 TABLET ORAL at 04:04

## 2023-01-23 ASSESSMENT — PAIN - FUNCTIONAL ASSESSMENT
PAIN_FUNCTIONAL_ASSESSMENT: PREVENTS OR INTERFERES SOME ACTIVE ACTIVITIES AND ADLS

## 2023-01-23 ASSESSMENT — PAIN DESCRIPTION - ORIENTATION
ORIENTATION: LEFT

## 2023-01-23 ASSESSMENT — PAIN SCALES - GENERAL
PAINLEVEL_OUTOF10: 6
PAINLEVEL_OUTOF10: 0
PAINLEVEL_OUTOF10: 6
PAINLEVEL_OUTOF10: 3
PAINLEVEL_OUTOF10: 7

## 2023-01-23 ASSESSMENT — PAIN DESCRIPTION - LOCATION
LOCATION: SHOULDER

## 2023-01-23 ASSESSMENT — PAIN DESCRIPTION - PAIN TYPE
TYPE: SURGICAL PAIN

## 2023-01-23 ASSESSMENT — PAIN DESCRIPTION - DESCRIPTORS
DESCRIPTORS: ACHING;DISCOMFORT

## 2023-01-23 ASSESSMENT — PAIN DESCRIPTION - ONSET: ONSET: ON-GOING

## 2023-01-23 ASSESSMENT — PAIN DESCRIPTION - FREQUENCY: FREQUENCY: INTERMITTENT

## 2023-01-23 NOTE — DISCHARGE SUMMARY
Department of Orthopedic Surgery  Physician Assistant   Discharge Summary    The Grace Casarez is a 79 y.o. male admitted for intramedullary nailing of left proximal humerus fracture. Grace Casarez was admitted to the floor following His recovery in the PACU. Discharge Diagnosis  left proximal humerus ORIF    Current Inpatient Medications    Current Facility-Administered Medications: metoprolol (LOPRESSOR) injection 5 mg, 5 mg, IntraVENous, Once  0.9 % sodium chloride bolus, 500 mL, IntraVENous, Once  buPROPion (WELLBUTRIN) tablet 100 mg, 100 mg, Oral, Daily  carvedilol (COREG) tablet 12.5 mg, 12.5 mg, Oral, BID  escitalopram (LEXAPRO) tablet 20 mg, 20 mg, Oral, Daily  lisinopril (PRINIVIL;ZESTRIL) tablet 10 mg, 10 mg, Oral, Daily  pantoprazole (PROTONIX) tablet 40 mg, 40 mg, Oral, QAM AC  0.9 % sodium chloride infusion, , IntraVENous, PRN  tamsulosin (FLOMAX) capsule 0.4 mg, 0.4 mg, Oral, Nightly  finasteride (PROSCAR) tablet 5 mg, 5 mg, Oral, Daily  gabapentin (NEURONTIN) capsule 200 mg, 200 mg, Oral, TID  fluticasone (FLOVENT HFA) 110 MCG/ACT inhaler 2 puff, 2 puff, Inhalation, BID  rosuvastatin (CRESTOR) tablet 40 mg, 40 mg, Oral, QPM  perflutren lipid microspheres (DEFINITY) injection 1.5 mL, 1.5 mL, IntraVENous, ONCE PRN  sodium chloride flush 0.9 % injection 5-40 mL, 5-40 mL, IntraVENous, 2 times per day  sodium chloride flush 0.9 % injection 5-40 mL, 5-40 mL, IntraVENous, PRN  0.9 % sodium chloride infusion, , IntraVENous, PRN  ondansetron (ZOFRAN-ODT) disintegrating tablet 4 mg, 4 mg, Oral, Q8H PRN **OR** ondansetron (ZOFRAN) injection 4 mg, 4 mg, IntraVENous, Q6H PRN  traMADol (ULTRAM) tablet 50 mg, 50 mg, Oral, Q6H PRN    Post-operatively the patients diet was advanced as tolerated and their dressing was changed on POD #1. The incision is dressing in place, clean, dry, and intact with no signs of infection.   The patient remained neurovascularly intact in the left upper and had intact pulses distally. Patients calf remained soft and showed no evidence of DVT. The patient was able to move their left upper extremity without any problems post-operatively. Physical therapy and occupational therapy were consulted and began working with the patient post-operatively. The patient progressed with PT/OT as would be expected and continued to improve through their stay. The patients pain was initially controlled with IV medications but we were able to transition to oral pain medications soon after arrival to the floor and their pain remained under good control through their hospital stay. From a medical standpoint the patient remained stable and continued to have the medicine team follow throughout their stay. The patient will be discharged at this time to [address] [place] [address]  with their current diet restrictions and will continue to follow the precautions outlined to them by us and PT/OT. Condition on Discharge: Stable    Plan  Return visit in 2 weeks. .  Patient was instructed on the use of pain medications, the signs and symptoms of infection, and was given our number to call should they have any questions or concerns following discharge. For opioid prescriptions given at discharge the following statement is provided for compliance with [employer] rules. Patient being given increased dosage/quantity of opoid pain medication in excess of [employer] guidelines which noted a 30 MED daily of opioids due to the fact that he/she has undergone major orthopaedic surgery as outlined in rule 4731-11-13. Dosages and further duration of the pain medication will be re-evaluated at her post op visit in 2 weeks. Patient was educated on dosing expectations and limits of prescribing as a result of the new [facility] rules enacted August 31, 2017.   Please also note that this is not the initial opoid prescription issued to this patient but a continuation of medication utilized during the hospital admission as noted in the medical record. [place]ARRS report has also been utilized to screen for any abuse history or suspicious activity as outlined in [place]. All efforts have been taken to prevent abuse potential and misuse of opioid medications including education, screening, and close clinical follow up.

## 2023-01-23 NOTE — PROGRESS NOTES
Pt ok for discharge per MD. Discharge instructions given. Script given to transport. IV's removed. Telemetry monitor removed and CMU notified. No questions or concerns at this time. Transported via stretcher by [facility] to [facility] with all belongings. Report called to Brennan Luna LPN at facility.

## 2023-01-23 NOTE — DISCHARGE INSTR - COC
Continuity of Care Form    Patient Name: Cesar Baez   :  1940  MRN:  [id number]    Admit date:  2023  Discharge date:  ***    Code Status Order: Full Code   Advance Directives:     Admitting Physician:  Manuel Gonzales MD  PCP: Mary Patel MD    Discharging Nurse: [facility]/Room#: 0115/0115-01  Discharging Unit Phone Number: ***    Emergency Contact:   Extended Emergency Contact Information  Primary Emergency Contact: Pavithra Villalba  Address: [address] [place] [address], [address] [place] [address] [address] [facility] of [address] Phone: [phone]  Work Phone: [phone]  Mobile Phone: [phone]  Relation: Spouse  Secondary Emergency Contact: [facility] Phone: [phone]  Mobile Phone: [phone]  Relation: Child    Past Surgical History:  Past Surgical History:   Procedure Laterality Date    BUNIONECTOMY      CARDIAC SURGERY      coronary stents x2    EYE SURGERY      Retinal reattachment - bilateral    HUMERUS FRACTURE SURGERY Left 2023    INTRAMEDULLARY NAILING OF LEFT PROXIMAL HUMERUS FRACTURE      MCKEON & NEPHEW performed by Manuel Gonzales MD at [facility]      Bilateral knees    KNEE SURGERY         Immunization History:   Immunization History   Administered Date(s) Administered    Tdap (Boostrix, Adacel) 2019       Active Problems:  Patient Active Problem List   Diagnosis Code    Chest pain R07.9    HTN (hypertension) I10    CAD (coronary artery disease) I25.10    Headache R51.9    Post-operative pain G89.18    NSTEMI (non-ST elevated myocardial infarction) ([facility] [address] I21.4    Tachycardia R00.0       Isolation/Infection:   Isolation            No Isolation          Patient Infection Status       None to display            Nurse Assessment:  Last Vital Signs: BP (!) 95/54   Pulse 78   Temp 98.2 °F (36.8 °C) (Oral)   Resp 18   Ht 5' 9\" (1.753 m)   Wt 167 lb 8.8 oz (76 kg)   SpO2 94%   BMI 24.74 kg/m²     Last documented pain score (0-10 scale): Pain Level: 3  Last Weight:   Wt Readings from Last 1 Encounters:   01/23/23 167 lb 8.8 oz (76 kg)     Mental Status:  oriented and alert    IV Access:  - None    Nursing Mobility/ADLs:  Walking   Assisted  Transfer  Assisted  Bathing  Assisted  Dressing  Assisted  Toileting  Assisted  Feeding  Assisted  Med Admin  Assisted  Med Delivery   whole    Wound Care Documentation and Therapy:  Incision 01/19/23 Shoulder Left;Proximal (Active)   Dressing Status Clean;Dry; Intact 01/23/23 0757   Dressing/Treatment Dry dressing 01/23/23 0757   Drainage Amount Large 01/23/23 0000   Odor None 01/23/23 0000   Number of days: 4        Elimination:  Continence: Bowel: Yes  Bladder: Yes  Urinary Catheter: None   Colostomy/Ileostomy/Ileal Conduit: No       Date of Last BM:     Intake/Output Summary (Last 24 hours) at 1/23/2023 1423  Last data filed at 1/23/2023 0856  Gross per 24 hour   Intake 240 ml   Output 1500 ml   Net -1260 ml     I/O last 3 completed shifts: In: 240 [P.O.:240]  Out: 2050 [Urine:2050]    Safety Concerns:     History of Falls (last 30 days) and At Risk for Falls    Impairments/Disabilities:      None    Nutrition Therapy:  Current Nutrition Therapy:   - Oral Diet:  General    Routes of Feeding: Oral  Liquids: Thin Liquids  Daily Fluid Restriction: no  Last Modified Barium Swallow with Video (Video Swallowing Test): not done    Treatments at the Time of Hospital Discharge:   Respiratory Treatments: N/A  Oxygen Therapy:  is not on home oxygen therapy.   Ventilator:    - No ventilator support    Rehab Therapies: Physical Therapy and Occupational Therapy  Weight Bearing Status/Restrictions: Non-weight bearing on left upper extremity   Other Medical Equipment (for information only, NOT a DME order):  walker  Other Treatments: N/A    Patient's personal belongings (please select all that are sent with patient):  None    RN SIGNATURE:  Electronically signed by Jaime Wooten RN on 1/23/23 at 2:43 PM EST    CASE MANAGEMENT/SOCIAL WORK SECTION    Inpatient Status Date: ***    Readmission Risk Assessment Score:  Readmission Risk              Risk of Unplanned Readmission:  14           Discharging to Facility/ Agency   Name:   Address:  Phone:  Fax:    Dialysis Facility (if applicable)   Name:  Address:  Dialysis Schedule:  Phone:  Fax:    / signature: {Lianaature:[id number]}    PHYSICIAN SECTION    Prognosis: Good    Condition at Discharge: Stable    Rehab Potential (if transferring to Rehab): Good    Recommended Follow-up, Labs or Other Treatments After Discharge:    PT/OT- Nonweightbearing left upper extremity  May use left arm for light tasks of daily living including eating  DVT prophylaxis: Ambulation, SCDs  Wound care: Reinforce dressings if necessary  Sling: May remove sling if resting in bed for comfort. Elbow range of motion is encouraged. May remove sling for shower. Recommend sleeping semiupright in recliner or elevated head of bed in order to rest arm in sling. Follow up with Dr Mary Gross as scheduled, Call [facility] at [phone] with any questions. Repeat CBC on Wednesday, 1/25/2023           Physician Certification: I certify the above information and transfer of Abelardo Li  is necessary for the continuing treatment of the diagnosis listed and that he requires [facility] for less 30 days.      Update Admission H&P: No change in H&P    PHYSICIAN SIGNATURE:  Electronically signed by Garrel Litten, PA on 1/23/23 at 2:24 PM EST

## 2023-01-23 NOTE — PROGRESS NOTES
Hospitalist Progress Note      PCP: Slim Deleon MD    Date of Admission: 1/19/2023    Chief Complaint: left should pain    Hospital Course:   79 y.o. male, s/p intramedullary nailing of the left proximal humerus fracture, who we are asked to see/evaluate by Daryl Wells MD for medical management of hypertension and tachycardia. The patient is doing well postoperatively. He is resting quietly in bed. The patient stated depending on how he moves his arm he has 7/10 pain. He was recently medicated with tramadol. This may be contributing to his tachycardia. The patient was also hypertensive upon arriving to the floor. He is now normotensive. His home medications have been reordered. The patient stated he is taking p.o. well. He denies any comments, questions and or concerns at this time. Subjective: shoulder pain well controlled. Denies chest pain. Remains on heparin gtt.        Medications:  Reviewed    Infusion Medications    sodium chloride      sodium chloride       Scheduled Medications    metoprolol  5 mg IntraVENous Once    sodium chloride  500 mL IntraVENous Once    buPROPion  100 mg Oral Daily    carvedilol  12.5 mg Oral BID    escitalopram  20 mg Oral Daily    lisinopril  10 mg Oral Daily    pantoprazole  40 mg Oral QAM AC    tamsulosin  0.4 mg Oral Nightly    finasteride  5 mg Oral Daily    gabapentin  200 mg Oral TID    fluticasone  2 puff Inhalation BID    rosuvastatin  40 mg Oral QPM    sodium chloride flush  5-40 mL IntraVENous 2 times per day     PRN Meds: sodium chloride, perflutren lipid microspheres, sodium chloride flush, sodium chloride, ondansetron **OR** ondansetron, traMADol      Intake/Output Summary (Last 24 hours) at 1/23/2023 1031  Last data filed at 1/23/2023 0856  Gross per 24 hour   Intake 240 ml   Output 1500 ml   Net -1260 ml       Physical Exam Performed:    BP (!) 149/76   Pulse 79   Temp 98.4 °F (36.9 °C) (Oral)   Resp 18   Ht 5' 9\" (1.753 m)   Wt 167 lb 8.8 oz (76 kg)   SpO2 95%   BMI 24.74 kg/m²     General appearance: Pleasant, elderly male in no apparent distress, appears stated age and cooperative.  HEENT: Pupils equal, round, and reactive to light.  Extra ocular muscles intact. Conjunctivae/corneas clear.  Neck: Supple, with full range of motion. No jugular venous distention. Trachea midline.  Respiratory:  Normal respiratory effort. Clear to auscultation, bilaterally without Rales/Wheezes/Rhonchi.  Cardiovascular: Regular rate and rhythm with normal S1/S2 without murmurs, rubs or gallops.  Abdomen: Soft, non-tender, non-distended with normal bowel sounds.  Musculoskeletal:  No clubbing, cyanosis or edema bilaterally.  Decreased ROM left arm related to surgery  Skin: Skin color, texture, turgor normal.  OR dressing clean dry and intact,.  Sling in place  Neurologic:  Neurovascularly intact  Cranial nerves: II-XII intact, grossly non-focal.  Psychiatric: Alert and oriented, thought content appropriate, normal insight  Capillary Refill: Brisk,3 seconds, normal   Peripheral Pulses: +2 palpable, equal bilaterally       Labs:   Recent Labs     01/20/23  1736 01/21/23  0956   WBC  --  10.7   HGB 7.7* 8.2*   HCT 24.2* 25.5*   PLT  --  359     Recent Labs     01/21/23  0956   *   K 3.8      CO2 20*   BUN 32*   CREATININE 1.4*   CALCIUM 9.0     No results for input(s): AST, ALT, BILIDIR, BILITOT, ALKPHOS in the last 72 hours.  No results for input(s): INR in the last 72 hours.  Recent Labs     01/20/23  1155 01/20/23  1555 01/21/23  0956   TROPONINI 0.28* 0.29* 0.22*       Urinalysis:      Lab Results   Component Value Date/Time    NITRU Negative 01/20/2023 02:00 AM    WBCUA 3-5 01/20/2023 02:00 AM    BACTERIA 1+ 07/13/2019 12:10 AM    RBCUA 0-2 01/20/2023 02:00 AM    BLOODU Negative 01/20/2023 02:00 AM    SPECGRAV 1.010 01/20/2023 02:00 AM    GLUCOSEU Negative 01/20/2023 02:00 AM    GLUCOSEU NEGATIVE 09/02/2010 03:45 PM       Radiology:  XR HUMERUS LEFT (MIN  2 VIEWS)   Final Result   Intraprocedural fluoroscopic spot images as above. See separate procedure   report for more information. FLUORO FOR SURGICAL PROCEDURES   Final Result          IP CONSULT TO HOSPITALIST  IP CONSULT TO CARDIOLOGY  IP CONSULT TO PHARMACY    Assessment/Plan:    Active Hospital Problems    Diagnosis     NSTEMI (non-ST elevated myocardial infarction) ([facility] [address] [I21.4]      Priority: Medium    Tachycardia [R00.0]      Priority: Medium    Post-operative pain [G89.18]      Priority: Medium    HTN (hypertension) [I10]     CAD (coronary artery disease) [I25.10]      Elevated troponin, known CAD  - cardiology consulted  - trend troponins  - EKG stable  - recent outpatient stress test with high risk study indicating likely fixed defect  - s/p heparin gtt  - continue statin, coreg     Humeral fracture  - s/p repair  - post-op care per surgery  - pain control ordered     Anemia  - unclear baseline  - expected post-op blood loss. No additional blood loss noted  - s/p 1u pRBCs  - continue to monitor    CKD  - Unclear baseline but not on history from OSH  - continue to monitor     HTN  - poorly controlled  - continue coreg, lisinopril     HLD  - continue statin     Asthma  - no evidence of exacerbation  - continue home inhalers     BPH  - continue proscar, flomax     Depression  - mood stable  - continue lexapro, wellbutrin    DVT Prophylaxis: lovenox  Diet: ADULT DIET; Regular  Code Status: Full Code  PT/OT Eval Status: ordered    Dispo - Per ortho.  No medical barriers to discharge    Appropriate for A1 Discharge Unit: Elizabeth Crowder MD

## 2023-01-23 NOTE — DISCHARGE INSTRUCTIONS
Left proximal humerus intramedullary nail  Nonweightbearing left upper extremity  May use left arm for light tasks of daily living including eating  DVT prophylaxis: Ambulation, SCDs  Wound care: Reinforce dressings if necessary  Sling: May remove sling if resting in bed for comfort. Elbow range of motion is encouraged. May remove sling for shower. Recommend sleeping semiupright in recliner or elevated head of bed in order to rest arm in sling. Follow up with Dr Neena Naranjo as scheduled, Call [facility] at [phone] with any questions.

## 2023-01-23 NOTE — PLAN OF CARE
Problem: Discharge Planning  Goal: Discharge to home or other facility with appropriate resources  Outcome: Progressing     Problem: Pain  Goal: Verbalizes/displays adequate comfort level or baseline comfort level  Outcome: Progressing     Problem: Safety - Adult  Goal: Free from fall injury  Outcome: Progressing  Flowsheets (Taken 1/22/2023 2000)  Free From Fall Injury:   Instruct family/caregiver on patient safety   Based on caregiver fall risk screen, instruct family/caregiver to ask for assistance with transferring infant if caregiver noted to have fall risk factors     Problem: Skin/Tissue Integrity  Goal: Absence of new skin breakdown  Description: 1. Monitor for areas of redness and/or skin breakdown  2. Assess vascular access sites hourly  3. Every 4-6 hours minimum:  Change oxygen saturation probe site  4. Every 4-6 hours:  If on nasal continuous positive airway pressure, respiratory therapy assess nares and determine need for appliance change or resting period.   Outcome: Progressing     Problem: ABCDS Injury Assessment  Goal: Absence of physical injury  Outcome: Progressing

## 2023-01-23 NOTE — CARE COORDINATION
CASE  MANAGEMENT DISCHARGE SUMMARY:    Discharge to: [facility]    Transportation Mode/Time: 3:00 pm by [facility] due to debility and medical status    Family Notified: Yes    New Durable Medical Equipment: n/a    HENS if applicable: Completed    Precertification obtained if needed: obtained by:    RN to RN report if SNF at dc: [phone]    social work has faxed dc information to facility at [facility]    Nurse responsible for completion of pg 1/2 of SRIKANTH (continuity of care form), med reconciliation/AVS, and to place copies of each in patient's hard chart. Nurse to ensure that any written prescriptions; copy of the patients chart to accompany patient to facility.

## 2023-01-23 NOTE — PLAN OF CARE
Problem: Pain  Goal: Verbalizes/displays adequate comfort level or baseline comfort level  Outcome: Progressing  Verbalizes/displays adequate comfort level or baseline comfort level: Encourage patient to monitor pain and request assistance     Problem: Safety - Adult  Goal: Free from fall injury  Outcome: Progressing     Problem: Skin/Tissue Integrity  Goal: Absence of new skin breakdown  Description: 1. Monitor for areas of redness and/or skin breakdown  2. Assess vascular access sites hourly  3. Every 4-6 hours minimum:  Change oxygen saturation probe site  4. Every 4-6 hours:  If on nasal continuous positive airway pressure, respiratory therapy assess nares and determine need for appliance change or resting period.   Outcome: Progressing     Problem: Discharge Planning  Goal: Discharge to home or other facility with appropriate resources  Outcome: Adequate for Discharge

## 2023-01-23 NOTE — CARE COORDINATION
Writer aware pt likely to discharge today to [facility], precert approved, rapid Covid negative this morning. Writer scheduled [facility] transport for 3-330pm.  Will await discharge order.   MENA Reynolds-PATTI

## 2023-01-23 NOTE — PROGRESS NOTES
Patient transferred to [facility] at this time. [address] [place] [address] notified.

## 2023-01-23 NOTE — PROGRESS NOTES
Department of Orthopedic Surgery  Physician Assistant   Progress Note    Subjective:       Systemic or Specific Complaints: No complaints, pain is well controlled in the left arm. Right forearm hematoma resolving. Patient reports he has been able to use his left arm for light tasks of daily living. Has not worked with therapy today. No family at bedside    Objective:     Patient Vitals for the past 24 hrs:   BP Temp Temp src Pulse Resp SpO2 Weight   01/23/23 1357 (!) 95/54 98.2 °F (36.8 °C) Oral 78 18 94 % --   01/23/23 1035 (!) 107/56 97.8 °F (36.6 °C) Oral 77 18 95 % --   01/23/23 0757 (!) 149/76 98.4 °F (36.9 °C) Oral 79 18 95 % --   01/23/23 0504 -- -- -- -- -- -- 167 lb 8.8 oz (76 kg)   01/23/23 0409 (!) 130/91 98.6 °F (37 °C) Oral 78 15 94 % --   01/22/23 2350 135/67 99 °F (37.2 °C) Oral 81 17 95 % --   01/22/23 1959 137/72 98.4 °F (36.9 °C) Oral 84 18 94 % --   01/22/23 1958 -- -- -- 86 -- 95 % --   01/22/23 1646 123/70 98.1 °F (36.7 °C) -- 86 16 97 % --       General: alert, appears stated age, and cooperative   Wound: Wound clean and dry no evidence of infection. , No Erythema, No Drainage, Wound Intact, and Positive for Edema   Motion: Painful range of Motion in affected extremity   DVT Exam: No evidence of DVT seen on physical exam.     Additional exam:  Right forearm hematoma continues to improve, receding from marked borders. Moving all fingers, hand, wrist on the right forearm. Good radial pulse and sensation intact to touch. Left shoulder dressing c/d/I. Pressure dressing taken down, incision well approximated, staples appropriately placed. Mepilex dressing placed over incisional areas. Sensation intact to touch in the left arm with good radial pulse.     Data Review  CBC:   Lab Results   Component Value Date/Time    WBC 10.7 01/21/2023 09:56 AM    RBC 2.54 01/21/2023 09:56 AM    HGB 7.7 01/23/2023 11:48 AM    HCT 23.2 01/23/2023 11:48 AM     01/21/2023 09:56 AM       Renal:   Lab Results   Component Value Date/Time     01/21/2023 09:56 AM    K 3.8 01/21/2023 09:56 AM     01/21/2023 09:56 AM    CO2 20 01/21/2023 09:56 AM    BUN 32 01/21/2023 09:56 AM    CREATININE 1.4 01/21/2023 09:56 AM    GLUCOSE 124 01/21/2023 09:56 AM    CALCIUM 9.0 01/21/2023 09:56 AM        Trop: 0.28    Assessment:     S/p left shoulder IM nail, right forearm hematoma, POD 4  DOS 1/19/23 by Dr Kacie Bergman  Acute blood loss anemia - expected after surgery and stable   S/p 1 unit PRBCs 1/20/23    Plan:      1:  Continue ice, sling, pain control for the left shoulder. IM managing cardiac symptoms at this time. Dressing to the right forearm IV site and can continue with hand ROM and ice for swelling. Will take time for hematoma to reabsorb. FERMIN KAM. PT/OT as able. 2:  Continue Deep venous thrombosis prophylaxis-SCD sleeves, ambulation from orthopedic standpoint. Patient no longer on heparin drip  3:  Continue Pain Control-tramadol as needed  4: Discharge appropriate for skilled nursing facility.   DCP updated, prescription in soft chart    DELORIS Grande

## 2023-02-01 ENCOUNTER — OFFICE VISIT (OUTPATIENT)
Dept: ORTHOPEDIC SURGERY | Age: 83
End: 2023-02-01

## 2023-02-01 VITALS — WEIGHT: 167 LBS | BODY MASS INDEX: 24.73 KG/M2 | HEIGHT: 69 IN

## 2023-02-01 DIAGNOSIS — Z47.89 ORTHOPEDIC AFTERCARE: Primary | ICD-10-CM

## 2023-02-01 PROCEDURE — 99024 POSTOP FOLLOW-UP VISIT: CPT | Performed by: ORTHOPAEDIC SURGERY

## 2023-02-01 NOTE — PROGRESS NOTES
ORTHOPAEDIC SURGERY FOLLOWUP VISIT    CHIEF COMPLAINT:  Follow-up left shoulder    DATE OF INJURY: 1/10/2023  DIAGNOSIS: Left proximal humerus fracture  DATE OF SURGERY: 1/19/2023, intramedullary nailing of left proximal humerus fracture    HISTORY OF PRESENT ILLNESS:  79-year-old right-hand-dominant male presents for evaluation 2 weeks status post left proximal humerus intramedullary nail fixation. Overall, he believes that the pain is improving day by day. He is having consistent pain in the shoulder region. He has not had any wound healing problems. No fever, chills, or night sweats. He has been residing at a facility. PHYSICAL EXAM:  General: Well-appearing male of stated age. Presents in wheelchair. Left upper extremity: Surgical incision sites are well approximated with skin staples. There is near mature healing. No signs of dehiscence. No open areas. No surrounding erythema. No active drainage. Minimal tenderness to palpation. There is ecchymosis and a state of resolution. There is moderate swelling to the shoulder. Elbow range of motion is from full extension to 140 degrees of flexion. He moves it gingerly related to pain in the biceps area. Sensation is intact to light touch in median, radial, ulnar, and axillary distributions. Motor function is intact to AIN, PIN, ulnar motor nerves. There is a strong palpable radial pulse, and brisk capillary refill to the fingers. Compartments are soft and compressible    RADIOGRAPHIC EXAM:  5 views of the left shoulder including AP, Grashey, supraspinous outlet view, scapular Y x-ray, axillary demonstrate appropriate alignment of proximal humerus fracture. There is no change in hardware position from immediate intraoperative imaging. Overall alignment is near anatomic. There is progressive periosteal callus formation about the lateral humerus. No loss of fixation. Glenohumeral joint appears appropriately positioned.     ASSESSMENT AND PLAN:  2 weeks status post left proximal humerus fracture intramedullary nailing    Nonweightbearing left upper extremity  Pain control  PT/OT  Would allow him to progress his activities with active range of motion of the elbow, wrist, hand. May also initiate pendulum/Codman exercises for the shoulder. Would hold off on any formal shoulder motion beyond this at this time. May remove staples at the facility effective Monday, 2/6/2023. Apply Steri-Strips. Return to clinic in 3 weeks for repeat x-rays. Plan to initiate active assisted range of motion for the shoulder at that time if there is appropriate interval healing on x-ray.     Faye Pallas, MD

## 2023-02-22 ENCOUNTER — OFFICE VISIT (OUTPATIENT)
Dept: ORTHOPEDIC SURGERY | Age: 83
End: 2023-02-22

## 2023-02-22 VITALS — BODY MASS INDEX: 24.73 KG/M2 | HEIGHT: 69 IN | WEIGHT: 167 LBS

## 2023-02-22 DIAGNOSIS — Z47.89 ORTHOPEDIC AFTERCARE: Primary | ICD-10-CM

## 2023-02-22 PROCEDURE — 99024 POSTOP FOLLOW-UP VISIT: CPT | Performed by: ORTHOPAEDIC SURGERY

## 2023-02-22 NOTE — PROGRESS NOTES
ORTHOPAEDIC SURGERY FOLLOWUP VISIT     CHIEF COMPLAINT:  Follow-up left shoulder     DATE OF INJURY: 1/10/2023  DIAGNOSIS: Left proximal humerus fracture  DATE OF SURGERY: 1/19/2023, intramedullary nailing of left proximal humerus fracture     HISTORY OF PRESENT ILLNESS:  82-year-old right-hand-dominant male presents for evaluation 1 month status post left proximal humerus intramedullary nail fixation.  Overall, he believes that the pain is improving gradually.  He continues to have pain.  He is having consistent pain in the shoulder region.  He has not had any wound healing problems.  No fever, chills, or night sweats.  He has been residing at a facility.     PHYSICAL EXAM:  General: Well-appearing male of stated age.  Presents in wheelchair.  Left upper extremity: Surgical scars are maturely healed at this point.  No signs of dehiscence.  No open areas.  No surrounding erythema.  No active drainage.  Minimal tenderness to palpation.  There is ecchymosis and a state of resolution.  There is moderate swelling to the shoulder.  Shoulder range of motion was not assessed today.  Elbow range of motion is from full extension to 140 degrees of flexion.  He moves it gingerly related to pain in the biceps area. Sensation is intact to light touch in median, radial, ulnar, and axillary distributions.  Motor function is intact to AIN, PIN, ulnar motor nerves.  There is a strong palpable radial pulse, and brisk capillary refill to the fingers.  Compartments are soft and compressible     RADIOGRAPHIC EXAM:  4 views of the left shoulder including AP, Grashey, supraspinous outlet view, scapular Y x-ray, axillary demonstrate appropriate alignment of proximal humerus fracture.  There is no change in hardware position from immediate intraoperative imaging.  Overall alignment is near anatomic.  There is progressive periosteal callus formation about the lateral humerus, greater tuberosity.  No loss of fixation.  There is minor  halo/lucency about the screw head/screw tips of proximal interlocking screws. Glenohumeral joint appears appropriately positioned. ASSESSMENT AND PLAN:  1 month status post left proximal humerus fracture intramedullary nailing     Nonweightbearing left upper extremity  May use upper extremity for light tasks of daily living. Lifting restriction 5 pounds. Pain control  PT/OT  Would allow him to progress his activities with active range of motion of the elbow, wrist, hand. May also initiate pendulum/Codman exercises for the shoulder. Would advance his activities to allow for passive range of motion of the shoulder at the direction of a physical therapist.  I instructed the patient to use pain as a guide to activity. Return to clinic in 3 weeks for repeat x-rays.       Candida Stephen MD

## 2023-03-15 ENCOUNTER — OFFICE VISIT (OUTPATIENT)
Dept: ORTHOPEDIC SURGERY | Age: 83
End: 2023-03-15

## 2023-03-15 VITALS — WEIGHT: 167 LBS | BODY MASS INDEX: 24.73 KG/M2 | HEIGHT: 69 IN

## 2023-03-15 DIAGNOSIS — M25.511 RIGHT SHOULDER PAIN, UNSPECIFIED CHRONICITY: ICD-10-CM

## 2023-03-15 DIAGNOSIS — Z47.89 ORTHOPEDIC AFTERCARE: Primary | ICD-10-CM

## 2023-03-15 DIAGNOSIS — M19.012 PRIMARY OSTEOARTHRITIS OF LEFT SHOULDER: ICD-10-CM

## 2023-03-21 ENCOUNTER — APPOINTMENT (OUTPATIENT)
Dept: CT IMAGING | Age: 83
DRG: 493 | End: 2023-03-21
Payer: OTHER GOVERNMENT

## 2023-03-21 ENCOUNTER — APPOINTMENT (OUTPATIENT)
Dept: GENERAL RADIOLOGY | Age: 83
DRG: 493 | End: 2023-03-21
Payer: OTHER GOVERNMENT

## 2023-03-21 ENCOUNTER — HOSPITAL ENCOUNTER (INPATIENT)
Age: 83
LOS: 7 days | Discharge: NURSING, REHAB OR LONG-TERM CARE | DRG: 493 | End: 2023-03-28
Attending: EMERGENCY MEDICINE | Admitting: INTERNAL MEDICINE
Payer: OTHER GOVERNMENT

## 2023-03-21 DIAGNOSIS — S42.201A CLOSED FRACTURE OF PROXIMAL END OF RIGHT HUMERUS, UNSPECIFIED FRACTURE MORPHOLOGY, INITIAL ENCOUNTER: Primary | ICD-10-CM

## 2023-03-21 PROBLEM — Z87.81 HISTORY OF HUMERUS FRACTURE: Status: ACTIVE | Noted: 2023-03-21

## 2023-03-21 LAB
ALBUMIN SERPL-MCNC: 3.8 G/DL (ref 3.4–5)
ALBUMIN/GLOB SERPL: 1.5 {RATIO} (ref 1.1–2.2)
ALP SERPL-CCNC: 69 U/L (ref 40–129)
ALT SERPL-CCNC: 9 U/L (ref 10–40)
ANION GAP SERPL CALCULATED.3IONS-SCNC: 11 MMOL/L (ref 3–16)
AST SERPL-CCNC: 13 U/L (ref 15–37)
BASOPHILS # BLD: 0.1 K/UL (ref 0–0.2)
BASOPHILS NFR BLD: 1.2 %
BILIRUB SERPL-MCNC: <0.2 MG/DL (ref 0–1)
BUN SERPL-MCNC: 24 MG/DL (ref 7–20)
CALCIUM SERPL-MCNC: 9.3 MG/DL (ref 8.3–10.6)
CHLORIDE SERPL-SCNC: 103 MMOL/L (ref 99–110)
CO2 SERPL-SCNC: 20 MMOL/L (ref 21–32)
CREAT SERPL-MCNC: 1.2 MG/DL (ref 0.8–1.3)
DEPRECATED RDW RBC AUTO: 19.3 % (ref 12.4–15.4)
EKG ATRIAL RATE: 65 BPM
EKG DIAGNOSIS: NORMAL
EKG P AXIS: 44 DEGREES
EKG P-R INTERVAL: 218 MS
EKG Q-T INTERVAL: 422 MS
EKG QRS DURATION: 94 MS
EKG QTC CALCULATION (BAZETT): 438 MS
EKG R AXIS: 4 DEGREES
EKG T AXIS: 15 DEGREES
EKG VENTRICULAR RATE: 65 BPM
EOSINOPHIL # BLD: 0.3 K/UL (ref 0–0.6)
EOSINOPHIL NFR BLD: 4.2 %
GFR SERPLBLD CREATININE-BSD FMLA CKD-EPI: 60 ML/MIN/{1.73_M2}
GLUCOSE SERPL-MCNC: 114 MG/DL (ref 70–99)
HCT VFR BLD AUTO: 30 % (ref 40.5–52.5)
HGB BLD-MCNC: 10.1 G/DL (ref 13.5–17.5)
LYMPHOCYTES # BLD: 1.2 K/UL (ref 1–5.1)
LYMPHOCYTES NFR BLD: 18 %
MCH RBC QN AUTO: 33.5 PG (ref 26–34)
MCHC RBC AUTO-ENTMCNC: 33.5 G/DL (ref 31–36)
MCV RBC AUTO: 99.9 FL (ref 80–100)
MONOCYTES # BLD: 0.5 K/UL (ref 0–1.3)
MONOCYTES NFR BLD: 7.9 %
NEUTROPHILS # BLD: 4.5 K/UL (ref 1.7–7.7)
NEUTROPHILS NFR BLD: 68.7 %
PLATELET # BLD AUTO: 329 K/UL (ref 135–450)
PMV BLD AUTO: 7.2 FL (ref 5–10.5)
POTASSIUM SERPL-SCNC: 4.3 MMOL/L (ref 3.5–5.1)
PROT SERPL-MCNC: 6.4 G/DL (ref 6.4–8.2)
RBC # BLD AUTO: 3.01 M/UL (ref 4.2–5.9)
SODIUM SERPL-SCNC: 134 MMOL/L (ref 136–145)
TROPONIN T SERPL-MCNC: 0.02 NG/ML
WBC # BLD AUTO: 6.6 K/UL (ref 4–11)

## 2023-03-21 PROCEDURE — 93010 ELECTROCARDIOGRAM REPORT: CPT | Performed by: INTERNAL MEDICINE

## 2023-03-21 PROCEDURE — 96374 THER/PROPH/DIAG INJ IV PUSH: CPT

## 2023-03-21 PROCEDURE — 84484 ASSAY OF TROPONIN QUANT: CPT

## 2023-03-21 PROCEDURE — 1200000000 HC SEMI PRIVATE

## 2023-03-21 PROCEDURE — 72125 CT NECK SPINE W/O DYE: CPT

## 2023-03-21 PROCEDURE — 71045 X-RAY EXAM CHEST 1 VIEW: CPT

## 2023-03-21 PROCEDURE — 73030 X-RAY EXAM OF SHOULDER: CPT

## 2023-03-21 PROCEDURE — 99285 EMERGENCY DEPT VISIT HI MDM: CPT

## 2023-03-21 PROCEDURE — 71250 CT THORAX DX C-: CPT

## 2023-03-21 PROCEDURE — 96375 TX/PRO/DX INJ NEW DRUG ADDON: CPT

## 2023-03-21 PROCEDURE — 73521 X-RAY EXAM HIPS BI 2 VIEWS: CPT

## 2023-03-21 PROCEDURE — 6360000002 HC RX W HCPCS: Performed by: PHYSICIAN ASSISTANT

## 2023-03-21 PROCEDURE — 85025 COMPLETE CBC W/AUTO DIFF WBC: CPT

## 2023-03-21 PROCEDURE — 93005 ELECTROCARDIOGRAM TRACING: CPT | Performed by: PHYSICIAN ASSISTANT

## 2023-03-21 PROCEDURE — 80053 COMPREHEN METABOLIC PANEL: CPT

## 2023-03-21 PROCEDURE — 70450 CT HEAD/BRAIN W/O DYE: CPT

## 2023-03-21 PROCEDURE — 36415 COLL VENOUS BLD VENIPUNCTURE: CPT

## 2023-03-21 RX ORDER — KETOTIFEN FUMARATE 0.35 MG/ML
1 SOLUTION/ DROPS OPHTHALMIC 2 TIMES DAILY
Status: DISCONTINUED | OUTPATIENT
Start: 2023-03-22 | End: 2023-03-23

## 2023-03-21 RX ORDER — FLUTICASONE PROPIONATE 50 MCG
2 SPRAY, SUSPENSION (ML) NASAL DAILY
COMMUNITY

## 2023-03-21 RX ORDER — FINASTERIDE 5 MG/1
5 TABLET, FILM COATED ORAL DAILY
Status: DISCONTINUED | OUTPATIENT
Start: 2023-03-22 | End: 2023-03-23

## 2023-03-21 RX ORDER — ALBUTEROL SULFATE 2.5 MG/3ML
2.5 SOLUTION RESPIRATORY (INHALATION) EVERY 4 HOURS PRN
Status: DISCONTINUED | OUTPATIENT
Start: 2023-03-21 | End: 2023-03-23

## 2023-03-21 RX ORDER — FENTANYL CITRATE 50 UG/ML
50 INJECTION, SOLUTION INTRAMUSCULAR; INTRAVENOUS ONCE
Status: COMPLETED | OUTPATIENT
Start: 2023-03-21 | End: 2023-03-21

## 2023-03-21 RX ORDER — SODIUM CHLORIDE 0.9 % (FLUSH) 0.9 %
5-40 SYRINGE (ML) INJECTION EVERY 12 HOURS SCHEDULED
Status: DISCONTINUED | OUTPATIENT
Start: 2023-03-21 | End: 2023-03-23

## 2023-03-21 RX ORDER — LATANOPROST 50 UG/ML
1 SOLUTION/ DROPS OPHTHALMIC NIGHTLY
Status: DISCONTINUED | OUTPATIENT
Start: 2023-03-22 | End: 2023-03-23

## 2023-03-21 RX ORDER — BUPROPION HYDROCHLORIDE 100 MG/1
100 TABLET, EXTENDED RELEASE ORAL DAILY
COMMUNITY

## 2023-03-21 RX ORDER — CARVEDILOL 6.25 MG/1
12.5 TABLET ORAL 2 TIMES DAILY
Status: DISCONTINUED | OUTPATIENT
Start: 2023-03-21 | End: 2023-03-23

## 2023-03-21 RX ORDER — LISINOPRIL 10 MG/1
10 TABLET ORAL DAILY
Status: DISCONTINUED | OUTPATIENT
Start: 2023-03-22 | End: 2023-03-23

## 2023-03-21 RX ORDER — METHOCARBAMOL 500 MG/1
500 TABLET, FILM COATED ORAL 2 TIMES DAILY PRN
COMMUNITY

## 2023-03-21 RX ORDER — ONDANSETRON 4 MG/1
4 TABLET, ORALLY DISINTEGRATING ORAL EVERY 8 HOURS PRN
Status: DISCONTINUED | OUTPATIENT
Start: 2023-03-21 | End: 2023-03-23

## 2023-03-21 RX ORDER — ROSUVASTATIN CALCIUM 10 MG/1
20 TABLET, COATED ORAL EVERY EVENING
Status: DISCONTINUED | OUTPATIENT
Start: 2023-03-22 | End: 2023-03-23

## 2023-03-21 RX ORDER — ATORVASTATIN CALCIUM 40 MG/1
80 TABLET, FILM COATED ORAL NIGHTLY
Status: DISCONTINUED | OUTPATIENT
Start: 2023-03-21 | End: 2023-03-21 | Stop reason: CLARIF

## 2023-03-21 RX ORDER — TRAMADOL HYDROCHLORIDE 50 MG/1
100 TABLET ORAL EVERY 6 HOURS PRN
Status: DISCONTINUED | OUTPATIENT
Start: 2023-03-21 | End: 2023-03-21 | Stop reason: SDUPTHER

## 2023-03-21 RX ORDER — IPRATROPIUM BROMIDE 21 UG/1
2 SPRAY, METERED NASAL EVERY 6 HOURS PRN
Status: DISCONTINUED | OUTPATIENT
Start: 2023-03-21 | End: 2023-03-23

## 2023-03-21 RX ORDER — PANTOPRAZOLE SODIUM 40 MG/1
40 TABLET, DELAYED RELEASE ORAL
Status: DISCONTINUED | OUTPATIENT
Start: 2023-03-22 | End: 2023-03-23

## 2023-03-21 RX ORDER — TRAMADOL HYDROCHLORIDE 50 MG/1
100 TABLET ORAL EVERY 6 HOURS PRN
Status: DISCONTINUED | OUTPATIENT
Start: 2023-03-21 | End: 2023-03-23

## 2023-03-21 RX ORDER — ESCITALOPRAM OXALATE 10 MG/1
20 TABLET ORAL DAILY
Status: DISCONTINUED | OUTPATIENT
Start: 2023-03-22 | End: 2023-03-23

## 2023-03-21 RX ORDER — POLYETHYLENE GLYCOL 3350 17 G/17G
17 POWDER, FOR SOLUTION ORAL DAILY PRN
Status: DISCONTINUED | OUTPATIENT
Start: 2023-03-21 | End: 2023-03-23

## 2023-03-21 RX ORDER — SODIUM CHLORIDE 9 MG/ML
INJECTION, SOLUTION INTRAVENOUS PRN
Status: DISCONTINUED | OUTPATIENT
Start: 2023-03-21 | End: 2023-03-23

## 2023-03-21 RX ORDER — BUPROPION HYDROCHLORIDE 100 MG/1
100 TABLET, EXTENDED RELEASE ORAL DAILY
Status: DISCONTINUED | OUTPATIENT
Start: 2023-03-22 | End: 2023-03-23

## 2023-03-21 RX ORDER — ACETAMINOPHEN 500 MG
1000 TABLET ORAL EVERY 6 HOURS
Status: DISCONTINUED | OUTPATIENT
Start: 2023-03-22 | End: 2023-03-23

## 2023-03-21 RX ORDER — ENOXAPARIN SODIUM 100 MG/ML
40 INJECTION SUBCUTANEOUS DAILY
Status: DISCONTINUED | OUTPATIENT
Start: 2023-03-22 | End: 2023-03-23

## 2023-03-21 RX ORDER — MORPHINE SULFATE 4 MG/ML
4 INJECTION, SOLUTION INTRAMUSCULAR; INTRAVENOUS ONCE
Status: COMPLETED | OUTPATIENT
Start: 2023-03-21 | End: 2023-03-21

## 2023-03-21 RX ORDER — LIDOCAINE 50 MG/G
1 PATCH TOPICAL DAILY
COMMUNITY

## 2023-03-21 RX ORDER — TRAMADOL HYDROCHLORIDE 50 MG/1
50 TABLET ORAL EVERY 6 HOURS PRN
Status: DISCONTINUED | OUTPATIENT
Start: 2023-03-21 | End: 2023-03-23

## 2023-03-21 RX ORDER — DORZOLAMIDE HYDROCHLORIDE AND TIMOLOL MALEATE 20; 5 MG/ML; MG/ML
1 SOLUTION/ DROPS OPHTHALMIC 2 TIMES DAILY
COMMUNITY

## 2023-03-21 RX ORDER — SODIUM CHLORIDE 0.9 % (FLUSH) 0.9 %
5-40 SYRINGE (ML) INJECTION PRN
Status: DISCONTINUED | OUTPATIENT
Start: 2023-03-21 | End: 2023-03-23

## 2023-03-21 RX ORDER — TAMSULOSIN HYDROCHLORIDE 0.4 MG/1
0.4 CAPSULE ORAL NIGHTLY
Status: DISCONTINUED | OUTPATIENT
Start: 2023-03-21 | End: 2023-03-23

## 2023-03-21 RX ORDER — ALBUTEROL SULFATE 90 UG/1
2 AEROSOL, METERED RESPIRATORY (INHALATION) EVERY 4 HOURS PRN
COMMUNITY

## 2023-03-21 RX ORDER — TRAMADOL HYDROCHLORIDE 50 MG/1
50 TABLET ORAL EVERY 6 HOURS PRN
Status: DISCONTINUED | OUTPATIENT
Start: 2023-03-21 | End: 2023-03-21 | Stop reason: SDUPTHER

## 2023-03-21 RX ORDER — GABAPENTIN 100 MG/1
200 CAPSULE ORAL 3 TIMES DAILY
Status: DISCONTINUED | OUTPATIENT
Start: 2023-03-22 | End: 2023-03-23

## 2023-03-21 RX ORDER — ONDANSETRON 2 MG/ML
4 INJECTION INTRAMUSCULAR; INTRAVENOUS EVERY 6 HOURS PRN
Status: DISCONTINUED | OUTPATIENT
Start: 2023-03-21 | End: 2023-03-23

## 2023-03-21 RX ORDER — BRIMONIDINE TARTRATE 0.15 %
1 DROPS OPHTHALMIC (EYE) 2 TIMES DAILY
COMMUNITY

## 2023-03-21 RX ADMIN — FENTANYL CITRATE 50 MCG: 50 INJECTION INTRAMUSCULAR; INTRAVENOUS at 19:20

## 2023-03-21 RX ADMIN — MORPHINE SULFATE 4 MG: 4 INJECTION, SOLUTION INTRAMUSCULAR; INTRAVENOUS at 18:02

## 2023-03-21 ASSESSMENT — PAIN SCALES - GENERAL
PAINLEVEL_OUTOF10: 8
PAINLEVEL_OUTOF10: 7
PAINLEVEL_OUTOF10: 8
PAINLEVEL_OUTOF10: 7
PAINLEVEL_OUTOF10: 8

## 2023-03-21 ASSESSMENT — PAIN DESCRIPTION - ORIENTATION
ORIENTATION: RIGHT

## 2023-03-21 ASSESSMENT — PAIN DESCRIPTION - LOCATION
LOCATION: SHOULDER
LOCATION: ARM;SHOULDER
LOCATION: ARM
LOCATION: ARM

## 2023-03-21 ASSESSMENT — LIFESTYLE VARIABLES
HOW OFTEN DO YOU HAVE A DRINK CONTAINING ALCOHOL: NEVER
HOW MANY STANDARD DRINKS CONTAINING ALCOHOL DO YOU HAVE ON A TYPICAL DAY: PATIENT DOES NOT DRINK

## 2023-03-21 ASSESSMENT — PAIN - FUNCTIONAL ASSESSMENT
PAIN_FUNCTIONAL_ASSESSMENT: 0-10
PAIN_FUNCTIONAL_ASSESSMENT: PREVENTS OR INTERFERES SOME ACTIVE ACTIVITIES AND ADLS
PAIN_FUNCTIONAL_ASSESSMENT: 0-10
PAIN_FUNCTIONAL_ASSESSMENT: 0-10

## 2023-03-21 ASSESSMENT — PAIN DESCRIPTION - PAIN TYPE
TYPE: ACUTE PAIN
TYPE: ACUTE PAIN

## 2023-03-21 ASSESSMENT — PAIN DESCRIPTION - DESCRIPTORS
DESCRIPTORS: PATIENT UNABLE TO DESCRIBE
DESCRIPTORS: ACHING
DESCRIPTORS: PATIENT UNABLE TO DESCRIBE

## 2023-03-21 ASSESSMENT — PAIN DESCRIPTION - ONSET: ONSET: ON-GOING

## 2023-03-21 ASSESSMENT — PAIN DESCRIPTION - FREQUENCY: FREQUENCY: CONTINUOUS

## 2023-03-21 NOTE — ED NOTES
Pts SPO2 dropped to 85% on RA after receiving Fentanyl . Pt placed on 2lpm via NC. SPO2 was 97% after several minutes. DELORIS Lindsey notified.       Yohana Ayon RN  03/21/23 1956

## 2023-03-21 NOTE — LETTER
[employer] [employer]   DR. Iam MONTOYA     TODAY'S DATE: 3/22/23    PATIENT'S NAME: Gabriel Becker    PATIENT'S NUMBER: [id number]    : 1940    PREFERRED PHONE NUMBER: [phone]      WORK PHONE NUMBER: There is no work phone number on file. DIAGNOSIS:   1. Closed fracture of proximal end of right humerus, unspecified fracture morphology, initial encounter      DATE OF SURGERY: 3/23/2023    PROCEDURE: RIGHT HUMERUS INTRAMEDULLARY NAILING    SURGEON: Dr. Cha Kind: Urgent    HOSPITAL: [address] [place] [address] St: Inpatient    ANESTHESIA: General   Pre-Op Block requested  No    LENGTH OF SURGERY: 2 hours    EQUIPMENT REQUESTED: [employer] long humerus intramedullary nails, beachchair position. X-RAYS REQUIRED: C-ARM      PCP: Wellington Rico MD    H&P TO BE DONE BY THE PCP      CARDIAC CLEARANCE NEEDED: No     ALLERGIES:   Allergies   Allergen Reactions    Pcn [Penicillins] Anaphylaxis    Percocet [Oxycodone-Acetaminophen]      \"Made me wacko\" Post surgical crying/hysterical    Vicodin [Hydrocodone-Acetaminophen]      \"Made me wacko\" Post surgical crying/hysterical       DME: none    POST-OP VISIT: 14 Days    OTHER INSTRUCTIONS/REMARKS: inpatient at [facility]. Available all day THURS.     INSURANCE INFORMATION: _________________________ CARD IN MEDIA IN EPIC___  CARD FAXED___    PRE-CERTIFICATION REQUIRED: YES   NO   PER _______________________    SURGERY SCHEDULED BY: ____________________________________    PATIENT CALLED AND CONFIRMED DATE & TIME: ______________________

## 2023-03-22 PROBLEM — J44.9 CHRONIC OBSTRUCTIVE PULMONARY DISEASE (HCC): Status: ACTIVE | Noted: 2023-03-22

## 2023-03-22 PROBLEM — N18.9 CHRONIC KIDNEY DISEASE: Status: ACTIVE | Noted: 2023-03-22

## 2023-03-22 PROBLEM — S42.201A CLOSED FRACTURE OF RIGHT PROXIMAL HUMERUS: Status: ACTIVE | Noted: 2023-03-22

## 2023-03-22 LAB
ANION GAP SERPL CALCULATED.3IONS-SCNC: 8 MMOL/L (ref 3–16)
BASOPHILS # BLD: 0.1 K/UL (ref 0–0.2)
BASOPHILS NFR BLD: 1.5 %
BUN SERPL-MCNC: 29 MG/DL (ref 7–20)
CALCIUM SERPL-MCNC: 9.1 MG/DL (ref 8.3–10.6)
CHLORIDE SERPL-SCNC: 105 MMOL/L (ref 99–110)
CO2 SERPL-SCNC: 24 MMOL/L (ref 21–32)
CREAT SERPL-MCNC: 1.6 MG/DL (ref 0.8–1.3)
DEPRECATED RDW RBC AUTO: 19.1 % (ref 12.4–15.4)
EOSINOPHIL # BLD: 0.2 K/UL (ref 0–0.6)
EOSINOPHIL NFR BLD: 3.6 %
GFR SERPLBLD CREATININE-BSD FMLA CKD-EPI: 42 ML/MIN/{1.73_M2}
GLUCOSE SERPL-MCNC: 92 MG/DL (ref 70–99)
HCT VFR BLD AUTO: 26.3 % (ref 40.5–52.5)
HGB BLD-MCNC: 8.7 G/DL (ref 13.5–17.5)
IRON SATN MFR SERPL: 23 % (ref 20–50)
IRON SERPL-MCNC: 61 UG/DL (ref 59–158)
LYMPHOCYTES # BLD: 1.7 K/UL (ref 1–5.1)
LYMPHOCYTES NFR BLD: 27.2 %
MCH RBC QN AUTO: 33.6 PG (ref 26–34)
MCHC RBC AUTO-ENTMCNC: 33.2 G/DL (ref 31–36)
MCV RBC AUTO: 101.1 FL (ref 80–100)
MONOCYTES # BLD: 0.6 K/UL (ref 0–1.3)
MONOCYTES NFR BLD: 10.3 %
NEUTROPHILS # BLD: 3.6 K/UL (ref 1.7–7.7)
NEUTROPHILS NFR BLD: 57.4 %
PLATELET # BLD AUTO: 306 K/UL (ref 135–450)
PMV BLD AUTO: 7.5 FL (ref 5–10.5)
POTASSIUM SERPL-SCNC: 4.3 MMOL/L (ref 3.5–5.1)
PROCALCITONIN SERPL IA-MCNC: 0.14 NG/ML (ref 0–0.15)
RBC # BLD AUTO: 2.6 M/UL (ref 4.2–5.9)
SARS-COV-2 RDRP RESP QL NAA+PROBE: NOT DETECTED
SODIUM SERPL-SCNC: 137 MMOL/L (ref 136–145)
TIBC SERPL-MCNC: 261 UG/DL (ref 260–445)
TROPONIN T SERPL-MCNC: 0.03 NG/ML
TROPONIN T SERPL-MCNC: 0.04 NG/ML
WBC # BLD AUTO: 6.3 K/UL (ref 4–11)

## 2023-03-22 PROCEDURE — 36415 COLL VENOUS BLD VENIPUNCTURE: CPT

## 2023-03-22 PROCEDURE — 80048 BASIC METABOLIC PNL TOTAL CA: CPT

## 2023-03-22 PROCEDURE — 84484 ASSAY OF TROPONIN QUANT: CPT

## 2023-03-22 PROCEDURE — 85025 COMPLETE CBC W/AUTO DIFF WBC: CPT

## 2023-03-22 PROCEDURE — 82607 VITAMIN B-12: CPT

## 2023-03-22 PROCEDURE — 83540 ASSAY OF IRON: CPT

## 2023-03-22 PROCEDURE — 87635 SARS-COV-2 COVID-19 AMP PRB: CPT

## 2023-03-22 PROCEDURE — 6360000002 HC RX W HCPCS: Performed by: INTERNAL MEDICINE

## 2023-03-22 PROCEDURE — 99233 SBSQ HOSP IP/OBS HIGH 50: CPT

## 2023-03-22 PROCEDURE — 82746 ASSAY OF FOLIC ACID SERUM: CPT

## 2023-03-22 PROCEDURE — 83550 IRON BINDING TEST: CPT

## 2023-03-22 PROCEDURE — 84145 PROCALCITONIN (PCT): CPT

## 2023-03-22 PROCEDURE — 1200000000 HC SEMI PRIVATE

## 2023-03-22 PROCEDURE — 6370000000 HC RX 637 (ALT 250 FOR IP): Performed by: INTERNAL MEDICINE

## 2023-03-22 PROCEDURE — 2580000003 HC RX 258: Performed by: INTERNAL MEDICINE

## 2023-03-22 RX ORDER — SODIUM CHLORIDE 9 MG/ML
INJECTION, SOLUTION INTRAVENOUS CONTINUOUS
Status: DISCONTINUED | OUTPATIENT
Start: 2023-03-22 | End: 2023-03-22

## 2023-03-22 RX ADMIN — Medication 10 ML: at 00:23

## 2023-03-22 RX ADMIN — FINASTERIDE 5 MG: 5 TABLET, FILM COATED ORAL at 09:26

## 2023-03-22 RX ADMIN — CARVEDILOL 12.5 MG: 6.25 TABLET, FILM COATED ORAL at 09:27

## 2023-03-22 RX ADMIN — GABAPENTIN 200 MG: 100 CAPSULE ORAL at 00:23

## 2023-03-22 RX ADMIN — KETOTIFEN FUMARATE 1 DROP: 0.35 SOLUTION/ DROPS OPHTHALMIC at 20:50

## 2023-03-22 RX ADMIN — GABAPENTIN 200 MG: 100 CAPSULE ORAL at 09:26

## 2023-03-22 RX ADMIN — ACETAMINOPHEN 1000 MG: 500 TABLET ORAL at 00:22

## 2023-03-22 RX ADMIN — ACETAMINOPHEN 1000 MG: 500 TABLET ORAL at 18:04

## 2023-03-22 RX ADMIN — ESCITALOPRAM OXALATE 20 MG: 10 TABLET ORAL at 09:27

## 2023-03-22 RX ADMIN — Medication 5 ML: at 09:24

## 2023-03-22 RX ADMIN — LISINOPRIL 10 MG: 10 TABLET ORAL at 09:27

## 2023-03-22 RX ADMIN — KETOTIFEN FUMARATE 1 DROP: 0.35 SOLUTION/ DROPS OPHTHALMIC at 09:28

## 2023-03-22 RX ADMIN — GABAPENTIN 200 MG: 100 CAPSULE ORAL at 20:50

## 2023-03-22 RX ADMIN — Medication 10 ML: at 20:50

## 2023-03-22 RX ADMIN — LATANOPROST 1 DROP: 50 SOLUTION OPHTHALMIC at 03:08

## 2023-03-22 RX ADMIN — BUPROPION HYDROCHLORIDE 100 MG: 100 TABLET, FILM COATED, EXTENDED RELEASE ORAL at 09:26

## 2023-03-22 RX ADMIN — ACETAMINOPHEN 1000 MG: 500 TABLET ORAL at 06:21

## 2023-03-22 RX ADMIN — TRAMADOL HYDROCHLORIDE 100 MG: 50 TABLET, COATED ORAL at 09:23

## 2023-03-22 RX ADMIN — PANTOPRAZOLE SODIUM 40 MG: 40 TABLET, DELAYED RELEASE ORAL at 06:21

## 2023-03-22 RX ADMIN — ENOXAPARIN SODIUM 40 MG: 100 INJECTION SUBCUTANEOUS at 09:24

## 2023-03-22 RX ADMIN — ROSUVASTATIN CALCIUM 20 MG: 10 TABLET, FILM COATED ORAL at 00:23

## 2023-03-22 RX ADMIN — CARVEDILOL 12.5 MG: 6.25 TABLET, FILM COATED ORAL at 00:23

## 2023-03-22 RX ADMIN — ACETAMINOPHEN 1000 MG: 500 TABLET ORAL at 12:07

## 2023-03-22 RX ADMIN — GABAPENTIN 200 MG: 100 CAPSULE ORAL at 13:46

## 2023-03-22 RX ADMIN — LATANOPROST 1 DROP: 50 SOLUTION OPHTHALMIC at 20:50

## 2023-03-22 RX ADMIN — ROSUVASTATIN CALCIUM 20 MG: 10 TABLET, FILM COATED ORAL at 18:04

## 2023-03-22 RX ADMIN — KETOTIFEN FUMARATE 1 DROP: 0.35 SOLUTION/ DROPS OPHTHALMIC at 03:08

## 2023-03-22 RX ADMIN — CARVEDILOL 12.5 MG: 6.25 TABLET, FILM COATED ORAL at 20:50

## 2023-03-22 ASSESSMENT — PAIN DESCRIPTION - ORIENTATION
ORIENTATION: RIGHT
ORIENTATION: RIGHT

## 2023-03-22 ASSESSMENT — PAIN SCALES - GENERAL
PAINLEVEL_OUTOF10: 8
PAINLEVEL_OUTOF10: 8
PAINLEVEL_OUTOF10: 6
PAINLEVEL_OUTOF10: 8
PAINLEVEL_OUTOF10: 8

## 2023-03-22 ASSESSMENT — PAIN DESCRIPTION - PAIN TYPE: TYPE: ACUTE PAIN

## 2023-03-22 ASSESSMENT — PAIN DESCRIPTION - ONSET: ONSET: ON-GOING

## 2023-03-22 ASSESSMENT — PAIN DESCRIPTION - DESCRIPTORS
DESCRIPTORS: SHARP;TENDER
DESCRIPTORS: ACHING

## 2023-03-22 ASSESSMENT — PAIN DESCRIPTION - LOCATION
LOCATION: ARM;SHOULDER
LOCATION: SHOULDER;ARM

## 2023-03-22 ASSESSMENT — PAIN DESCRIPTION - FREQUENCY: FREQUENCY: CONTINUOUS

## 2023-03-22 NOTE — ED NOTES
ED SBAR report provider to [facility]. Patient to be transported to Room 0208 via stretcher by RN. Patient transported with bedside cardiac monitor and with IV medications infusing. IV site clean, dry, and intact. MEWS score and pain assessed as 8/10 and documented. Updated patient and family on plan of care.      Kim Boykin RN  03/21/23 2037

## 2023-03-22 NOTE — H&P
Hospital Medicine History & Physical      PCP: Paramjit Rossi MD    Date of Admission: 3/21/2023    Date of Service: Pt seen/examined on 3/21/2023    Chief Complaint: Fall      History Of Present Illness:    79 y.o. male who presented to the hospital with a chief complaint of right arm pain and fall. Patient was at home today when he suffered a mechanical fall. Patient states he was upstairs trying to reach the chair rail and his legs gave out underneath him. He fell landing on his right side. Developed pretty significant pain and a mild deformity of his right upper extremity. In emergency department he was found to have a right humeral fracture. Patient is a recovering alcoholic and in January had a fracture of his left humerus as well. He had been progressing with his rehab and has suffered a setback as well. Orthopedic surgery was consulted in the emergency department and will see the patient in consultation. The patient denied any preceding symptoms, no shortness of breath, chest pain or loss of consciousness. Past Medical History:          Diagnosis Date    Asthma     CAD (coronary artery disease)     HTN (hypertension) 7/12/2019    Hyperlipidemia     Prostate cancer ([facility] [address]         History of alcohol abuse    Past Surgical History:          Procedure Laterality Date    BUNIONECTOMY      CARDIAC SURGERY      coronary stents x2    EYE SURGERY      Retinal reattachment - bilateral    HUMERUS FRACTURE SURGERY Left 1/19/2023    INTRAMEDULLARY NAILING OF LEFT PROXIMAL HUMERUS FRACTURE      MCKEON & NEPHEW performed by Kourtney Pat MD at [facility]      Bilateral knees    KNEE SURGERY         Medications Prior to Admission:      Prior to Admission medications    Medication Sig Start Date End Date Taking?  Authorizing Provider   brimonidine (ALPHAGAN P) 0.15 % ophthalmic solution Place 1 drop into both eyes in the morning and at bedtime   Yes Historical Provider, MD   buPROPion

## 2023-03-22 NOTE — PROGRESS NOTES
repeat evaluation    Regards to the right shoulder, I reviewed the x-rays in detail with him. I reviewed that he has tried all conservative managements including corticosteroid injection which has only given him transient relief. I indicated to him that given the degree of his osteoarthritis, not much is likely to be helpful for the shoulder short of arthroplasty. I counseled him that he should allow his left upper extremity to heal and strengthen before considering any form of surgery on the right shoulder. In the event that he does desire to undergo arthroplasty of the shoulder, would recommend referral to Dr. Shyanne Colón for evaluation.      Diana Rodriguez MD

## 2023-03-23 ENCOUNTER — APPOINTMENT (OUTPATIENT)
Dept: GENERAL RADIOLOGY | Age: 83
DRG: 493 | End: 2023-03-23
Payer: OTHER GOVERNMENT

## 2023-03-23 ENCOUNTER — ANESTHESIA (OUTPATIENT)
Dept: OPERATING ROOM | Age: 83
End: 2023-03-23
Payer: OTHER GOVERNMENT

## 2023-03-23 ENCOUNTER — ANESTHESIA EVENT (OUTPATIENT)
Dept: OPERATING ROOM | Age: 83
End: 2023-03-23
Payer: OTHER GOVERNMENT

## 2023-03-23 LAB
ANION GAP SERPL CALCULATED.3IONS-SCNC: 10 MMOL/L (ref 3–16)
BASOPHILS # BLD: 0.1 K/UL (ref 0–0.2)
BASOPHILS NFR BLD: 1.2 %
BUN SERPL-MCNC: 30 MG/DL (ref 7–20)
CALCIUM SERPL-MCNC: 8.8 MG/DL (ref 8.3–10.6)
CHLORIDE SERPL-SCNC: 103 MMOL/L (ref 99–110)
CO2 SERPL-SCNC: 19 MMOL/L (ref 21–32)
CREAT SERPL-MCNC: 1.4 MG/DL (ref 0.8–1.3)
DEPRECATED RDW RBC AUTO: 19 % (ref 12.4–15.4)
EOSINOPHIL # BLD: 0.3 K/UL (ref 0–0.6)
EOSINOPHIL NFR BLD: 5 %
FOLATE SERPL-MCNC: 14.41 NG/ML (ref 4.78–24.2)
GFR SERPLBLD CREATININE-BSD FMLA CKD-EPI: 50 ML/MIN/{1.73_M2}
GLUCOSE SERPL-MCNC: 92 MG/DL (ref 70–99)
HCT VFR BLD AUTO: 26.7 % (ref 40.5–52.5)
HGB BLD-MCNC: 9 G/DL (ref 13.5–17.5)
LYMPHOCYTES # BLD: 1.8 K/UL (ref 1–5.1)
LYMPHOCYTES NFR BLD: 25.8 %
MCH RBC QN AUTO: 33.7 PG (ref 26–34)
MCHC RBC AUTO-ENTMCNC: 33.6 G/DL (ref 31–36)
MCV RBC AUTO: 100.3 FL (ref 80–100)
MONOCYTES # BLD: 0.7 K/UL (ref 0–1.3)
MONOCYTES NFR BLD: 10.5 %
NEUTROPHILS # BLD: 4 K/UL (ref 1.7–7.7)
NEUTROPHILS NFR BLD: 57.5 %
PLATELET # BLD AUTO: 288 K/UL (ref 135–450)
PMV BLD AUTO: 7.6 FL (ref 5–10.5)
POTASSIUM SERPL-SCNC: 4.1 MMOL/L (ref 3.5–5.1)
RBC # BLD AUTO: 2.66 M/UL (ref 4.2–5.9)
SODIUM SERPL-SCNC: 132 MMOL/L (ref 136–145)
VIT B12 SERPL-MCNC: 557 PG/ML (ref 211–911)
WBC # BLD AUTO: 6.9 K/UL (ref 4–11)

## 2023-03-23 PROCEDURE — 1200000000 HC SEMI PRIVATE

## 2023-03-23 PROCEDURE — 2500000003 HC RX 250 WO HCPCS: Performed by: ANESTHESIOLOGY

## 2023-03-23 PROCEDURE — 99232 SBSQ HOSP IP/OBS MODERATE 35: CPT | Performed by: INTERNAL MEDICINE

## 2023-03-23 PROCEDURE — 2720000010 HC SURG SUPPLY STERILE: Performed by: ORTHOPAEDIC SURGERY

## 2023-03-23 PROCEDURE — 80048 BASIC METABOLIC PNL TOTAL CA: CPT

## 2023-03-23 PROCEDURE — 3700000000 HC ANESTHESIA ATTENDED CARE: Performed by: ORTHOPAEDIC SURGERY

## 2023-03-23 PROCEDURE — C1713 ANCHOR/SCREW BN/BN,TIS/BN: HCPCS | Performed by: ORTHOPAEDIC SURGERY

## 2023-03-23 PROCEDURE — 0PHF06Z INSERTION OF INTRAMEDULLARY INTERNAL FIXATION DEVICE INTO RIGHT HUMERAL SHAFT, OPEN APPROACH: ICD-10-PCS | Performed by: ORTHOPAEDIC SURGERY

## 2023-03-23 PROCEDURE — 2709999900 HC NON-CHARGEABLE SUPPLY: Performed by: ORTHOPAEDIC SURGERY

## 2023-03-23 PROCEDURE — 2580000003 HC RX 258: Performed by: NURSE ANESTHETIST, CERTIFIED REGISTERED

## 2023-03-23 PROCEDURE — 2580000003 HC RX 258: Performed by: INTERNAL MEDICINE

## 2023-03-23 PROCEDURE — 6360000002 HC RX W HCPCS: Performed by: ORTHOPAEDIC SURGERY

## 2023-03-23 PROCEDURE — 3600000014 HC SURGERY LEVEL 4 ADDTL 15MIN: Performed by: ORTHOPAEDIC SURGERY

## 2023-03-23 PROCEDURE — 2580000003 HC RX 258: Performed by: ORTHOPAEDIC SURGERY

## 2023-03-23 PROCEDURE — 2700000000 HC OXYGEN THERAPY PER DAY

## 2023-03-23 PROCEDURE — 2500000003 HC RX 250 WO HCPCS: Performed by: NURSE ANESTHETIST, CERTIFIED REGISTERED

## 2023-03-23 PROCEDURE — 2500000003 HC RX 250 WO HCPCS: Performed by: ORTHOPAEDIC SURGERY

## 2023-03-23 PROCEDURE — 6370000000 HC RX 637 (ALT 250 FOR IP): Performed by: INTERNAL MEDICINE

## 2023-03-23 PROCEDURE — 6360000002 HC RX W HCPCS: Performed by: PHYSICIAN ASSISTANT

## 2023-03-23 PROCEDURE — 85025 COMPLETE CBC W/AUTO DIFF WBC: CPT

## 2023-03-23 PROCEDURE — 7100000000 HC PACU RECOVERY - FIRST 15 MIN: Performed by: ORTHOPAEDIC SURGERY

## 2023-03-23 PROCEDURE — 3700000001 HC ADD 15 MINUTES (ANESTHESIA): Performed by: ORTHOPAEDIC SURGERY

## 2023-03-23 PROCEDURE — 2580000003 HC RX 258: Performed by: PHYSICIAN ASSISTANT

## 2023-03-23 PROCEDURE — 94761 N-INVAS EAR/PLS OXIMETRY MLT: CPT

## 2023-03-23 PROCEDURE — 36415 COLL VENOUS BLD VENIPUNCTURE: CPT

## 2023-03-23 PROCEDURE — 2580000003 HC RX 258: Performed by: ANESTHESIOLOGY

## 2023-03-23 PROCEDURE — 6360000002 HC RX W HCPCS: Performed by: NURSE ANESTHETIST, CERTIFIED REGISTERED

## 2023-03-23 PROCEDURE — 24516 TX HUMRL SHAFT FX IMED IMPLT: CPT | Performed by: ORTHOPAEDIC SURGERY

## 2023-03-23 PROCEDURE — 3209999900 FLUORO FOR SURGICAL PROCEDURES

## 2023-03-23 PROCEDURE — 7100000001 HC PACU RECOVERY - ADDTL 15 MIN: Performed by: ORTHOPAEDIC SURGERY

## 2023-03-23 PROCEDURE — 3600000004 HC SURGERY LEVEL 4 BASE: Performed by: ORTHOPAEDIC SURGERY

## 2023-03-23 DEVICE — TRIGEN 4.0MM X 28MM SELF-TAPPING                                    CORTICAL SCREW TITANIUM
Type: IMPLANTABLE DEVICE | Site: HUMERUS | Status: FUNCTIONAL
Brand: TRIGEN

## 2023-03-23 DEVICE — 5.0MM X 44MM SELF-TAPPING                                    CANCELLOUS SCREW TITANIUM
Type: IMPLANTABLE DEVICE | Site: HUMERUS | Status: FUNCTIONAL
Brand: TRIGEN

## 2023-03-23 DEVICE — 5.0MM X 40MM SELF-TAPPING                                    CANCELLOUS SCREW TITANIUM
Type: IMPLANTABLE DEVICE | Site: HUMERUS | Status: FUNCTIONAL
Brand: TRIGEN

## 2023-03-23 DEVICE — TRIGEN HUMERAL NAIL 10/8.5MM X                                    24CM PROXIMAL BENT
Type: IMPLANTABLE DEVICE | Site: HUMERUS | Status: FUNCTIONAL
Brand: TRIGEN

## 2023-03-23 DEVICE — TRIGEN 4.0MM X 24MM SELF-TAPPING                                    CORTICAL SCREW TITANIUM
Type: IMPLANTABLE DEVICE | Site: HUMERUS | Status: FUNCTIONAL
Brand: TRIGEN

## 2023-03-23 RX ORDER — OXYCODONE HYDROCHLORIDE 5 MG/1
10 TABLET ORAL PRN
Status: DISCONTINUED | OUTPATIENT
Start: 2023-03-23 | End: 2023-03-23 | Stop reason: HOSPADM

## 2023-03-23 RX ORDER — SODIUM CHLORIDE 9 MG/ML
INJECTION, SOLUTION INTRAVENOUS PRN
Status: DISCONTINUED | OUTPATIENT
Start: 2023-03-23 | End: 2023-03-28 | Stop reason: HOSPADM

## 2023-03-23 RX ORDER — ONDANSETRON 4 MG/1
4 TABLET, ORALLY DISINTEGRATING ORAL EVERY 8 HOURS PRN
Status: DISCONTINUED | OUTPATIENT
Start: 2023-03-23 | End: 2023-03-28 | Stop reason: HOSPADM

## 2023-03-23 RX ORDER — SODIUM CHLORIDE 9 MG/ML
INJECTION, SOLUTION INTRAVENOUS PRN
Status: DISCONTINUED | OUTPATIENT
Start: 2023-03-23 | End: 2023-03-23 | Stop reason: HOSPADM

## 2023-03-23 RX ORDER — TRAMADOL HYDROCHLORIDE 50 MG/1
50 TABLET ORAL EVERY 6 HOURS PRN
Status: DISCONTINUED | OUTPATIENT
Start: 2023-03-23 | End: 2023-03-28 | Stop reason: HOSPADM

## 2023-03-23 RX ORDER — TRAMADOL HYDROCHLORIDE 50 MG/1
100 TABLET ORAL EVERY 6 HOURS PRN
Status: DISCONTINUED | OUTPATIENT
Start: 2023-03-23 | End: 2023-03-28 | Stop reason: HOSPADM

## 2023-03-23 RX ORDER — ONDANSETRON 2 MG/ML
4 INJECTION INTRAMUSCULAR; INTRAVENOUS
Status: DISCONTINUED | OUTPATIENT
Start: 2023-03-23 | End: 2023-03-24

## 2023-03-23 RX ORDER — ROCURONIUM BROMIDE 10 MG/ML
INJECTION, SOLUTION INTRAVENOUS PRN
Status: DISCONTINUED | OUTPATIENT
Start: 2023-03-23 | End: 2023-03-23 | Stop reason: SDUPTHER

## 2023-03-23 RX ORDER — LIDOCAINE HYDROCHLORIDE 20 MG/ML
INJECTION, SOLUTION EPIDURAL; INFILTRATION; INTRACAUDAL; PERINEURAL PRN
Status: DISCONTINUED | OUTPATIENT
Start: 2023-03-23 | End: 2023-03-23 | Stop reason: SDUPTHER

## 2023-03-23 RX ORDER — SODIUM CHLORIDE 0.9 % (FLUSH) 0.9 %
5-40 SYRINGE (ML) INJECTION PRN
Status: DISCONTINUED | OUTPATIENT
Start: 2023-03-23 | End: 2023-03-24

## 2023-03-23 RX ORDER — ATROPINE SULFATE 0.4 MG/ML
AMPUL (ML) INJECTION PRN
Status: DISCONTINUED | OUTPATIENT
Start: 2023-03-23 | End: 2023-03-23 | Stop reason: SDUPTHER

## 2023-03-23 RX ORDER — SODIUM CHLORIDE 0.9 % (FLUSH) 0.9 %
5-40 SYRINGE (ML) INJECTION EVERY 12 HOURS SCHEDULED
Status: DISCONTINUED | OUTPATIENT
Start: 2023-03-23 | End: 2023-03-23 | Stop reason: HOSPADM

## 2023-03-23 RX ORDER — ONDANSETRON 2 MG/ML
4 INJECTION INTRAMUSCULAR; INTRAVENOUS EVERY 6 HOURS PRN
Status: DISCONTINUED | OUTPATIENT
Start: 2023-03-23 | End: 2023-03-28 | Stop reason: HOSPADM

## 2023-03-23 RX ORDER — MEPERIDINE HYDROCHLORIDE 25 MG/ML
12.5 INJECTION INTRAMUSCULAR; INTRAVENOUS; SUBCUTANEOUS EVERY 5 MIN PRN
Status: DISCONTINUED | OUTPATIENT
Start: 2023-03-23 | End: 2023-03-28 | Stop reason: HOSPADM

## 2023-03-23 RX ORDER — BUPIVACAINE HYDROCHLORIDE 2.5 MG/ML
INJECTION, SOLUTION INFILTRATION; PERINEURAL PRN
Status: DISCONTINUED | OUTPATIENT
Start: 2023-03-23 | End: 2023-03-23 | Stop reason: ALTCHOICE

## 2023-03-23 RX ORDER — GLYCOPYRROLATE 0.2 MG/ML
INJECTION INTRAMUSCULAR; INTRAVENOUS PRN
Status: DISCONTINUED | OUTPATIENT
Start: 2023-03-23 | End: 2023-03-23 | Stop reason: SDUPTHER

## 2023-03-23 RX ORDER — SODIUM CHLORIDE, SODIUM LACTATE, POTASSIUM CHLORIDE, CALCIUM CHLORIDE 600; 310; 30; 20 MG/100ML; MG/100ML; MG/100ML; MG/100ML
INJECTION, SOLUTION INTRAVENOUS CONTINUOUS PRN
Status: DISCONTINUED | OUTPATIENT
Start: 2023-03-23 | End: 2023-03-23 | Stop reason: SDUPTHER

## 2023-03-23 RX ORDER — SODIUM CHLORIDE 0.9 % (FLUSH) 0.9 %
5-40 SYRINGE (ML) INJECTION EVERY 12 HOURS SCHEDULED
Status: DISCONTINUED | OUTPATIENT
Start: 2023-03-23 | End: 2023-03-24

## 2023-03-23 RX ORDER — FENTANYL CITRATE 50 UG/ML
INJECTION, SOLUTION INTRAMUSCULAR; INTRAVENOUS PRN
Status: DISCONTINUED | OUTPATIENT
Start: 2023-03-23 | End: 2023-03-23 | Stop reason: SDUPTHER

## 2023-03-23 RX ORDER — SODIUM CHLORIDE 0.9 % (FLUSH) 0.9 %
5-40 SYRINGE (ML) INJECTION PRN
Status: DISCONTINUED | OUTPATIENT
Start: 2023-03-23 | End: 2023-03-23 | Stop reason: HOSPADM

## 2023-03-23 RX ORDER — SODIUM CHLORIDE 0.9 % (FLUSH) 0.9 %
5-40 SYRINGE (ML) INJECTION EVERY 12 HOURS SCHEDULED
Status: DISCONTINUED | OUTPATIENT
Start: 2023-03-23 | End: 2023-03-28 | Stop reason: HOSPADM

## 2023-03-23 RX ORDER — SODIUM CHLORIDE 0.9 % (FLUSH) 0.9 %
5-40 SYRINGE (ML) INJECTION PRN
Status: DISCONTINUED | OUTPATIENT
Start: 2023-03-23 | End: 2023-03-28 | Stop reason: HOSPADM

## 2023-03-23 RX ORDER — SODIUM CHLORIDE 9 MG/ML
25 INJECTION, SOLUTION INTRAVENOUS PRN
Status: DISCONTINUED | OUTPATIENT
Start: 2023-03-23 | End: 2023-03-28 | Stop reason: HOSPADM

## 2023-03-23 RX ORDER — OXYCODONE HYDROCHLORIDE 5 MG/1
5 TABLET ORAL PRN
Status: DISCONTINUED | OUTPATIENT
Start: 2023-03-23 | End: 2023-03-23 | Stop reason: HOSPADM

## 2023-03-23 RX ORDER — PROPOFOL 10 MG/ML
INJECTION, EMULSION INTRAVENOUS PRN
Status: DISCONTINUED | OUTPATIENT
Start: 2023-03-23 | End: 2023-03-23 | Stop reason: SDUPTHER

## 2023-03-23 RX ORDER — SODIUM CHLORIDE 9 MG/ML
INJECTION, SOLUTION INTRAVENOUS CONTINUOUS
Status: DISCONTINUED | OUTPATIENT
Start: 2023-03-23 | End: 2023-03-23 | Stop reason: HOSPADM

## 2023-03-23 RX ORDER — ONDANSETRON 2 MG/ML
INJECTION INTRAMUSCULAR; INTRAVENOUS PRN
Status: DISCONTINUED | OUTPATIENT
Start: 2023-03-23 | End: 2023-03-23 | Stop reason: SDUPTHER

## 2023-03-23 RX ADMIN — ATROPINE SULFATE 0.2 MG: 0.4 INJECTION, SOLUTION INTRAMUSCULAR; INTRAVENOUS; SUBCUTANEOUS at 15:16

## 2023-03-23 RX ADMIN — ROCURONIUM BROMIDE 50 MG: 10 INJECTION, SOLUTION INTRAVENOUS at 14:31

## 2023-03-23 RX ADMIN — CARVEDILOL 12.5 MG: 6.25 TABLET, FILM COATED ORAL at 10:49

## 2023-03-23 RX ADMIN — PHENYLEPHRINE HYDROCHLORIDE 100 MCG: 10 INJECTION INTRAVENOUS at 16:11

## 2023-03-23 RX ADMIN — PHENYLEPHRINE HYDROCHLORIDE 100 MCG: 10 INJECTION INTRAVENOUS at 16:07

## 2023-03-23 RX ADMIN — TRAMADOL HYDROCHLORIDE 50 MG: 50 TABLET, COATED ORAL at 10:49

## 2023-03-23 RX ADMIN — TRAMADOL HYDROCHLORIDE 100 MG: 50 TABLET, COATED ORAL at 19:47

## 2023-03-23 RX ADMIN — KETOTIFEN FUMARATE 1 DROP: 0.35 SOLUTION/ DROPS OPHTHALMIC at 10:52

## 2023-03-23 RX ADMIN — PHENYLEPHRINE HYDROCHLORIDE 100 MCG: 10 INJECTION INTRAVENOUS at 15:46

## 2023-03-23 RX ADMIN — SODIUM CHLORIDE, POTASSIUM CHLORIDE, SODIUM LACTATE AND CALCIUM CHLORIDE: 600; 310; 30; 20 INJECTION, SOLUTION INTRAVENOUS at 14:24

## 2023-03-23 RX ADMIN — PHENYLEPHRINE HYDROCHLORIDE 100 MCG: 10 INJECTION INTRAVENOUS at 15:51

## 2023-03-23 RX ADMIN — PROPOFOL 80 MG: 10 INJECTION, EMULSION INTRAVENOUS at 14:31

## 2023-03-23 RX ADMIN — GLYCOPYRROLATE 0.2 MG: 0.2 INJECTION, SOLUTION INTRAMUSCULAR; INTRAVENOUS at 15:11

## 2023-03-23 RX ADMIN — Medication 10 ML: at 10:50

## 2023-03-23 RX ADMIN — CEFAZOLIN 2000 MG: 2 INJECTION, POWDER, FOR SOLUTION INTRAMUSCULAR; INTRAVENOUS at 00:06

## 2023-03-23 RX ADMIN — ONDANSETRON HYDROCHLORIDE 4 MG: 2 INJECTION, SOLUTION INTRAMUSCULAR; INTRAVENOUS at 16:14

## 2023-03-23 RX ADMIN — ATROPINE SULFATE 0.2 MG: 0.4 INJECTION, SOLUTION INTRAMUSCULAR; INTRAVENOUS; SUBCUTANEOUS at 15:12

## 2023-03-23 RX ADMIN — ACETAMINOPHEN 1000 MG: 500 TABLET ORAL at 00:08

## 2023-03-23 RX ADMIN — CEFAZOLIN 2000 MG: 2 INJECTION, POWDER, FOR SOLUTION INTRAMUSCULAR; INTRAVENOUS at 14:36

## 2023-03-23 RX ADMIN — Medication 20 MG: at 14:10

## 2023-03-23 RX ADMIN — FENTANYL CITRATE 25 MCG: 50 INJECTION INTRAMUSCULAR; INTRAVENOUS at 15:07

## 2023-03-23 RX ADMIN — PHENYLEPHRINE HYDROCHLORIDE 100 MCG: 10 INJECTION INTRAVENOUS at 15:17

## 2023-03-23 RX ADMIN — CEFAZOLIN 2000 MG: 2 INJECTION, POWDER, FOR SOLUTION INTRAMUSCULAR; INTRAVENOUS at 21:48

## 2023-03-23 RX ADMIN — LIDOCAINE HYDROCHLORIDE 80 MG: 20 INJECTION, SOLUTION EPIDURAL; INFILTRATION; INTRACAUDAL; PERINEURAL at 14:31

## 2023-03-23 RX ADMIN — Medication 10 ML: at 21:05

## 2023-03-23 RX ADMIN — PHENYLEPHRINE HYDROCHLORIDE 100 MCG: 10 INJECTION INTRAVENOUS at 15:26

## 2023-03-23 RX ADMIN — ROCURONIUM BROMIDE 10 MG: 10 INJECTION, SOLUTION INTRAVENOUS at 15:47

## 2023-03-23 ASSESSMENT — ENCOUNTER SYMPTOMS: SHORTNESS OF BREATH: 1

## 2023-03-23 ASSESSMENT — PAIN SCALES - GENERAL
PAINLEVEL_OUTOF10: 7
PAINLEVEL_OUTOF10: 8
PAINLEVEL_OUTOF10: 8

## 2023-03-23 ASSESSMENT — LIFESTYLE VARIABLES: SMOKING_STATUS: 0

## 2023-03-23 ASSESSMENT — PAIN DESCRIPTION - ORIENTATION
ORIENTATION: RIGHT
ORIENTATION: RIGHT

## 2023-03-23 ASSESSMENT — PAIN DESCRIPTION - LOCATION
LOCATION: ARM
LOCATION: SHOULDER

## 2023-03-23 NOTE — ANESTHESIA POSTPROCEDURE EVALUATION
Department of Anesthesiology  Postprocedure Note    Patient: Alex Begum  MRN: [id number]  YOB: 1940  Date of evaluation: 3/23/2023      Procedure Summary     Date: 03/23/23 Room / Location: [place] [address] / [facility]    Anesthesia Start: 7326 Anesthesia Stop: 1082    Procedure: RIGHT HUMERUS INTRAMEDULLARY NAILING (Right: Shoulder) Diagnosis:       Closed fracture of proximal end of right humerus, unspecified fracture morphology, initial encounter      (Closed fracture of proximal end of right humerus, unspecified fracture morphology, initial encounter [L08.943L])    Surgeons: Laurent Bradford MD Responsible Provider: Rylie Terrell MD    Anesthesia Type: general ASA Status: 3          Anesthesia Type: No value filed.     Daly Phase I: Daly Score: 8    Daly Phase II:      Vitals:    03/23/23 1635 03/23/23 1640 03/23/23 1645 03/23/23 1655   BP: (!) 152/70 (!) 150/65 125/68 122/69   Pulse: 81 82 79 80   Resp: 16 17 12 12   Temp: 98.6 °F (37 °C)      TempSrc:       SpO2: 95% (!) 89% 94% 100%   Weight:       Height:         Anesthesia Post Evaluation    Patient participation: complete - patient participated  Level of consciousness: awake and alert  Nausea & Vomiting: no nausea  Cardiovascular status: hemodynamically stable  Respiratory status: acceptable and nasal cannula  Hydration status: euvolemic

## 2023-03-23 NOTE — CONSULTS
ORTHOPAEDIC SURGERY INITIAL EVALUATION NOTE  Chief Complaint   Patient presents with    Fall     Pt is unsteady at baseline. Hx of falling. Does use assistants with rollaider and walkers. Had a fall today. Right shoulder pain. HISTORY OF PRESENT ILLNESS:  18-year-old right-hand-dominant male presents for evaluation of right shoulder injury. He sustained a mechanical fall. He is unsteady at baseline with his gait. He has a history of prior falls. He is approximately 2 months status post left proximal humerus intramedullary nail fixation for a displaced proximal humerus fracture. Overall, he had been recovering well from this. He was in the process of considering possible operative intervention for right shoulder osteoarthritis. He presented to the emergency department on 3/21/2023 after mechanical fall. A evaluation demonstrated displaced proximal humeral shaft fracture.     Past Medical History:   Diagnosis Date    Asthma     CAD (coronary artery disease)     CKD (chronic kidney disease), stage III (HCC)     HTN (hypertension) 07/12/2019    Hyperlipidemia     Idiopathic normal pressure hydrocephalus (HCC)     Prostate cancer ([facility] [address]        Current Facility-Administered Medications   Medication Dose Route Frequency Provider Last Rate Last Admin    buPROPion [facility] SR) extended release tablet 100 mg  100 mg Oral Daily Orquidea Tapia MD   100 mg at 03/22/23 0926    carvedilol (COREG) tablet 12.5 mg  12.5 mg Oral BID Orquidea Tapia MD   12.5 mg at 03/23/23 1049    escitalopram (LEXAPRO) tablet 20 mg  20 mg Oral Daily Orquidea Tapia MD   20 mg at 03/22/23 9502    finasteride (PROSCAR) tablet 5 mg  5 mg Oral Daily Orquidea Tapia MD   5 mg at 03/22/23 0926    gabapentin (NEURONTIN) capsule 200 mg  200 mg Oral TID Orquidea Tapia MD   200 mg at 03/22/23 2050    ipratropium (ATROVENT) 0.03 % nasal spray 2 spray  2 spray Each Nostril Q6H PRN Orquidea Tapia MD        latanoprost (XALATAN) 0.005 %

## 2023-03-23 NOTE — OP NOTE
He was taken to the postoperative recovery area in apparent stable condition. There were no immediate complications. All sponge and needle counts were correct. Postoperative plan: Nonweightbearing right upper extremity  Sling  May come out of sling for hygiene purposes and active elbow hand and wrist motion. Due to the patient's right hand dominance, would allow him to perform light activities of daily living such as eating with the right upper extremity. May advance left upper extremity weightbearing for functional use  Left shoulder range of motion as tolerated. Ancef IV x24 hours postop. DVT prophylaxis: Ambulation, SCDs  Anticipate discharge in 2 to 3 days pending progress with therapy, medical stability. Anticipate need for placement. Follow-up in 2 weeks for repeat x-rays, wound check.     Electronically signed by Balaji Diggs MD on 3/23/2023 at 4:37 PM

## 2023-03-24 PROBLEM — E78.5 HYPERLIPIDEMIA: Status: ACTIVE | Noted: 2023-03-24

## 2023-03-24 LAB
ANION GAP SERPL CALCULATED.3IONS-SCNC: 10 MMOL/L (ref 3–16)
BUN SERPL-MCNC: 21 MG/DL (ref 7–20)
CALCIUM SERPL-MCNC: 8.5 MG/DL (ref 8.3–10.6)
CHLORIDE SERPL-SCNC: 103 MMOL/L (ref 99–110)
CO2 SERPL-SCNC: 22 MMOL/L (ref 21–32)
CREAT SERPL-MCNC: 1.1 MG/DL (ref 0.8–1.3)
DEPRECATED RDW RBC AUTO: 18.9 % (ref 12.4–15.4)
GFR SERPLBLD CREATININE-BSD FMLA CKD-EPI: >60 ML/MIN/{1.73_M2}
GLUCOSE SERPL-MCNC: 114 MG/DL (ref 70–99)
HCT VFR BLD AUTO: 23.4 % (ref 40.5–52.5)
HGB BLD-MCNC: 7.8 G/DL (ref 13.5–17.5)
MCH RBC QN AUTO: 33.7 PG (ref 26–34)
MCHC RBC AUTO-ENTMCNC: 33.2 G/DL (ref 31–36)
MCV RBC AUTO: 101.6 FL (ref 80–100)
PLATELET # BLD AUTO: 247 K/UL (ref 135–450)
PMV BLD AUTO: 7.5 FL (ref 5–10.5)
POTASSIUM SERPL-SCNC: 4.1 MMOL/L (ref 3.5–5.1)
RBC # BLD AUTO: 2.31 M/UL (ref 4.2–5.9)
SODIUM SERPL-SCNC: 135 MMOL/L (ref 136–145)
WBC # BLD AUTO: 7 K/UL (ref 4–11)

## 2023-03-24 PROCEDURE — 85027 COMPLETE CBC AUTOMATED: CPT

## 2023-03-24 PROCEDURE — 2580000003 HC RX 258: Performed by: ORTHOPAEDIC SURGERY

## 2023-03-24 PROCEDURE — 36415 COLL VENOUS BLD VENIPUNCTURE: CPT

## 2023-03-24 PROCEDURE — 97535 SELF CARE MNGMENT TRAINING: CPT

## 2023-03-24 PROCEDURE — 80048 BASIC METABOLIC PNL TOTAL CA: CPT

## 2023-03-24 PROCEDURE — 97163 PT EVAL HIGH COMPLEX 45 MIN: CPT

## 2023-03-24 PROCEDURE — 97166 OT EVAL MOD COMPLEX 45 MIN: CPT

## 2023-03-24 PROCEDURE — 6370000000 HC RX 637 (ALT 250 FOR IP): Performed by: INTERNAL MEDICINE

## 2023-03-24 PROCEDURE — 94640 AIRWAY INHALATION TREATMENT: CPT

## 2023-03-24 PROCEDURE — 2700000000 HC OXYGEN THERAPY PER DAY

## 2023-03-24 PROCEDURE — 99232 SBSQ HOSP IP/OBS MODERATE 35: CPT | Performed by: INTERNAL MEDICINE

## 2023-03-24 PROCEDURE — 6370000000 HC RX 637 (ALT 250 FOR IP)

## 2023-03-24 PROCEDURE — 6360000002 HC RX W HCPCS: Performed by: ORTHOPAEDIC SURGERY

## 2023-03-24 PROCEDURE — APPNB60 APP NON BILLABLE TIME 46-60 MINS: Performed by: PHYSICIAN ASSISTANT

## 2023-03-24 PROCEDURE — 1200000000 HC SEMI PRIVATE

## 2023-03-24 PROCEDURE — 94761 N-INVAS EAR/PLS OXIMETRY MLT: CPT

## 2023-03-24 PROCEDURE — 97530 THERAPEUTIC ACTIVITIES: CPT

## 2023-03-24 RX ORDER — DORZOLAMIDE HYDROCHLORIDE AND TIMOLOL MALEATE 20; 5 MG/ML; MG/ML
1 SOLUTION/ DROPS OPHTHALMIC 2 TIMES DAILY
Status: DISCONTINUED | OUTPATIENT
Start: 2023-03-24 | End: 2023-03-28 | Stop reason: HOSPADM

## 2023-03-24 RX ORDER — ROSUVASTATIN CALCIUM 10 MG/1
20 TABLET, COATED ORAL EVERY EVENING
Status: DISCONTINUED | OUTPATIENT
Start: 2023-03-24 | End: 2023-03-28 | Stop reason: HOSPADM

## 2023-03-24 RX ORDER — ALBUTEROL SULFATE 90 UG/1
2 AEROSOL, METERED RESPIRATORY (INHALATION) EVERY 4 HOURS PRN
Status: DISCONTINUED | OUTPATIENT
Start: 2023-03-24 | End: 2023-03-28 | Stop reason: HOSPADM

## 2023-03-24 RX ORDER — ACETAMINOPHEN 325 MG/1
650 TABLET ORAL EVERY 4 HOURS PRN
Status: DISCONTINUED | OUTPATIENT
Start: 2023-03-24 | End: 2023-03-28 | Stop reason: HOSPADM

## 2023-03-24 RX ORDER — FINASTERIDE 5 MG/1
5 TABLET, FILM COATED ORAL DAILY
Status: DISCONTINUED | OUTPATIENT
Start: 2023-03-24 | End: 2023-03-28 | Stop reason: HOSPADM

## 2023-03-24 RX ORDER — IPRATROPIUM BROMIDE 21 UG/1
2 SPRAY, METERED NASAL EVERY 6 HOURS PRN
Status: DISCONTINUED | OUTPATIENT
Start: 2023-03-24 | End: 2023-03-28 | Stop reason: HOSPADM

## 2023-03-24 RX ORDER — BUPROPION HYDROCHLORIDE 100 MG/1
100 TABLET, EXTENDED RELEASE ORAL DAILY
Status: DISCONTINUED | OUTPATIENT
Start: 2023-03-24 | End: 2023-03-28 | Stop reason: HOSPADM

## 2023-03-24 RX ORDER — LISINOPRIL 10 MG/1
10 TABLET ORAL DAILY
Status: DISCONTINUED | OUTPATIENT
Start: 2023-03-24 | End: 2023-03-28 | Stop reason: HOSPADM

## 2023-03-24 RX ORDER — GABAPENTIN 100 MG/1
200 CAPSULE ORAL 3 TIMES DAILY
Status: DISCONTINUED | OUTPATIENT
Start: 2023-03-24 | End: 2023-03-28 | Stop reason: HOSPADM

## 2023-03-24 RX ORDER — LATANOPROST 50 UG/ML
1 SOLUTION/ DROPS OPHTHALMIC NIGHTLY
Status: DISCONTINUED | OUTPATIENT
Start: 2023-03-24 | End: 2023-03-28 | Stop reason: HOSPADM

## 2023-03-24 RX ORDER — PANTOPRAZOLE SODIUM 40 MG/1
40 TABLET, DELAYED RELEASE ORAL
Status: DISCONTINUED | OUTPATIENT
Start: 2023-03-25 | End: 2023-03-28 | Stop reason: HOSPADM

## 2023-03-24 RX ORDER — CARVEDILOL 6.25 MG/1
12.5 TABLET ORAL 2 TIMES DAILY
Status: DISCONTINUED | OUTPATIENT
Start: 2023-03-24 | End: 2023-03-28 | Stop reason: HOSPADM

## 2023-03-24 RX ORDER — TAMSULOSIN HYDROCHLORIDE 0.4 MG/1
0.4 CAPSULE ORAL NIGHTLY
Status: DISCONTINUED | OUTPATIENT
Start: 2023-03-24 | End: 2023-03-28 | Stop reason: HOSPADM

## 2023-03-24 RX ORDER — BRIMONIDINE TARTRATE 2 MG/ML
1 SOLUTION/ DROPS OPHTHALMIC 2 TIMES DAILY
Status: DISCONTINUED | OUTPATIENT
Start: 2023-03-24 | End: 2023-03-28 | Stop reason: HOSPADM

## 2023-03-24 RX ORDER — KETOTIFEN FUMARATE 0.35 MG/ML
1 SOLUTION/ DROPS OPHTHALMIC 2 TIMES DAILY
Status: DISCONTINUED | OUTPATIENT
Start: 2023-03-24 | End: 2023-03-28 | Stop reason: HOSPADM

## 2023-03-24 RX ORDER — ESCITALOPRAM OXALATE 10 MG/1
20 TABLET ORAL DAILY
Status: DISCONTINUED | OUTPATIENT
Start: 2023-03-24 | End: 2023-03-28 | Stop reason: HOSPADM

## 2023-03-24 RX ADMIN — TRAMADOL HYDROCHLORIDE 100 MG: 50 TABLET, COATED ORAL at 20:58

## 2023-03-24 RX ADMIN — Medication 10 ML: at 10:42

## 2023-03-24 RX ADMIN — Medication 2 PUFF: at 15:38

## 2023-03-24 RX ADMIN — GABAPENTIN 200 MG: 100 CAPSULE ORAL at 20:58

## 2023-03-24 RX ADMIN — ROSUVASTATIN CALCIUM 20 MG: 10 TABLET, FILM COATED ORAL at 20:57

## 2023-03-24 RX ADMIN — CEFAZOLIN 2000 MG: 2 INJECTION, POWDER, FOR SOLUTION INTRAMUSCULAR; INTRAVENOUS at 06:04

## 2023-03-24 RX ADMIN — LATANOPROST 1 DROP: 50 SOLUTION/ DROPS OPHTHALMIC at 21:00

## 2023-03-24 RX ADMIN — TRAMADOL HYDROCHLORIDE 50 MG: 50 TABLET, COATED ORAL at 13:53

## 2023-03-24 RX ADMIN — Medication 10 ML: at 21:15

## 2023-03-24 RX ADMIN — KETOTIFEN FUMARATE 1 DROP: 0.35 SOLUTION/ DROPS OPHTHALMIC at 21:00

## 2023-03-24 RX ADMIN — TAMSULOSIN HYDROCHLORIDE 0.4 MG: 0.4 CAPSULE ORAL at 20:58

## 2023-03-24 RX ADMIN — BRIMONIDINE TARTRATE 1 DROP: 2 SOLUTION OPHTHALMIC at 21:01

## 2023-03-24 RX ADMIN — ACETAMINOPHEN 650 MG: 325 TABLET ORAL at 21:08

## 2023-03-24 RX ADMIN — CARVEDILOL 12.5 MG: 6.25 TABLET, FILM COATED ORAL at 20:57

## 2023-03-24 RX ADMIN — DORZOLAMIDE HYDROCHLORIDE AND TIMOLOL MALEATE 1 DROP: 20; 5 SOLUTION/ DROPS OPHTHALMIC at 21:00

## 2023-03-24 ASSESSMENT — PAIN SCALES - GENERAL: PAINLEVEL_OUTOF10: 8

## 2023-03-25 LAB
ANION GAP SERPL CALCULATED.3IONS-SCNC: 11 MMOL/L (ref 3–16)
BASOPHILS # BLD: 0.1 K/UL (ref 0–0.2)
BASOPHILS NFR BLD: 1.1 %
BUN SERPL-MCNC: 22 MG/DL (ref 7–20)
CALCIUM SERPL-MCNC: 8.3 MG/DL (ref 8.3–10.6)
CHLORIDE SERPL-SCNC: 106 MMOL/L (ref 99–110)
CO2 SERPL-SCNC: 19 MMOL/L (ref 21–32)
CREAT SERPL-MCNC: 1.2 MG/DL (ref 0.8–1.3)
DEPRECATED RDW RBC AUTO: 19.2 % (ref 12.4–15.4)
EOSINOPHIL # BLD: 0.1 K/UL (ref 0–0.6)
EOSINOPHIL NFR BLD: 1.9 %
GFR SERPLBLD CREATININE-BSD FMLA CKD-EPI: 60 ML/MIN/{1.73_M2}
GLUCOSE SERPL-MCNC: 93 MG/DL (ref 70–99)
HCT VFR BLD AUTO: 22.9 % (ref 40.5–52.5)
HGB BLD-MCNC: 7.5 G/DL (ref 13.5–17.5)
LYMPHOCYTES # BLD: 1.5 K/UL (ref 1–5.1)
LYMPHOCYTES NFR BLD: 21.1 %
MCH RBC QN AUTO: 33.4 PG (ref 26–34)
MCHC RBC AUTO-ENTMCNC: 32.8 G/DL (ref 31–36)
MCV RBC AUTO: 102 FL (ref 80–100)
MONOCYTES # BLD: 1 K/UL (ref 0–1.3)
MONOCYTES NFR BLD: 14.4 %
NEUTROPHILS # BLD: 4.3 K/UL (ref 1.7–7.7)
NEUTROPHILS NFR BLD: 61.5 %
PLATELET # BLD AUTO: 214 K/UL (ref 135–450)
PMV BLD AUTO: 7.7 FL (ref 5–10.5)
POTASSIUM SERPL-SCNC: 4 MMOL/L (ref 3.5–5.1)
RBC # BLD AUTO: 2.25 M/UL (ref 4.2–5.9)
SODIUM SERPL-SCNC: 136 MMOL/L (ref 136–145)
WBC # BLD AUTO: 7 K/UL (ref 4–11)

## 2023-03-25 PROCEDURE — 99232 SBSQ HOSP IP/OBS MODERATE 35: CPT | Performed by: INTERNAL MEDICINE

## 2023-03-25 PROCEDURE — 6360000002 HC RX W HCPCS: Performed by: INTERNAL MEDICINE

## 2023-03-25 PROCEDURE — 97110 THERAPEUTIC EXERCISES: CPT

## 2023-03-25 PROCEDURE — 1200000000 HC SEMI PRIVATE

## 2023-03-25 PROCEDURE — 6370000000 HC RX 637 (ALT 250 FOR IP)

## 2023-03-25 PROCEDURE — 6370000000 HC RX 637 (ALT 250 FOR IP): Performed by: INTERNAL MEDICINE

## 2023-03-25 PROCEDURE — 36415 COLL VENOUS BLD VENIPUNCTURE: CPT

## 2023-03-25 PROCEDURE — 2580000003 HC RX 258: Performed by: INTERNAL MEDICINE

## 2023-03-25 PROCEDURE — 80048 BASIC METABOLIC PNL TOTAL CA: CPT

## 2023-03-25 PROCEDURE — 85025 COMPLETE CBC W/AUTO DIFF WBC: CPT

## 2023-03-25 PROCEDURE — 2580000003 HC RX 258: Performed by: ORTHOPAEDIC SURGERY

## 2023-03-25 RX ORDER — SODIUM CHLORIDE 9 MG/ML
INJECTION, SOLUTION INTRAVENOUS ONCE
Status: COMPLETED | OUTPATIENT
Start: 2023-03-25 | End: 2023-03-25

## 2023-03-25 RX ADMIN — CARVEDILOL 12.5 MG: 6.25 TABLET, FILM COATED ORAL at 10:30

## 2023-03-25 RX ADMIN — DORZOLAMIDE HYDROCHLORIDE AND TIMOLOL MALEATE 1 DROP: 20; 5 SOLUTION/ DROPS OPHTHALMIC at 21:39

## 2023-03-25 RX ADMIN — CEFAZOLIN 2000 MG: 2 INJECTION, POWDER, FOR SOLUTION INTRAMUSCULAR; INTRAVENOUS at 21:45

## 2023-03-25 RX ADMIN — GABAPENTIN 200 MG: 100 CAPSULE ORAL at 21:37

## 2023-03-25 RX ADMIN — Medication 10 ML: at 21:42

## 2023-03-25 RX ADMIN — DORZOLAMIDE HYDROCHLORIDE AND TIMOLOL MALEATE 1 DROP: 20; 5 SOLUTION/ DROPS OPHTHALMIC at 10:31

## 2023-03-25 RX ADMIN — CEFAZOLIN 2000 MG: 2 INJECTION, POWDER, FOR SOLUTION INTRAMUSCULAR; INTRAVENOUS at 15:57

## 2023-03-25 RX ADMIN — PANTOPRAZOLE SODIUM 40 MG: 40 TABLET, DELAYED RELEASE ORAL at 05:30

## 2023-03-25 RX ADMIN — BUPROPION HYDROCHLORIDE 100 MG: 100 TABLET, EXTENDED RELEASE ORAL at 10:29

## 2023-03-25 RX ADMIN — BRIMONIDINE TARTRATE 1 DROP: 2 SOLUTION OPHTHALMIC at 21:39

## 2023-03-25 RX ADMIN — LATANOPROST 1 DROP: 50 SOLUTION/ DROPS OPHTHALMIC at 21:40

## 2023-03-25 RX ADMIN — FINASTERIDE 5 MG: 5 TABLET, FILM COATED ORAL at 10:30

## 2023-03-25 RX ADMIN — ROSUVASTATIN CALCIUM 20 MG: 10 TABLET, FILM COATED ORAL at 21:37

## 2023-03-25 RX ADMIN — ESCITALOPRAM OXALATE 20 MG: 10 TABLET ORAL at 10:29

## 2023-03-25 RX ADMIN — GABAPENTIN 200 MG: 100 CAPSULE ORAL at 10:30

## 2023-03-25 RX ADMIN — SODIUM CHLORIDE: 0.9 INJECTION, SOLUTION INTRAVENOUS at 03:14

## 2023-03-25 RX ADMIN — SODIUM CHLORIDE: 9 INJECTION, SOLUTION INTRAVENOUS at 15:47

## 2023-03-25 RX ADMIN — GABAPENTIN 200 MG: 100 CAPSULE ORAL at 15:39

## 2023-03-25 RX ADMIN — TAMSULOSIN HYDROCHLORIDE 0.4 MG: 0.4 CAPSULE ORAL at 21:37

## 2023-03-25 RX ADMIN — LISINOPRIL 10 MG: 10 TABLET ORAL at 10:29

## 2023-03-25 RX ADMIN — KETOTIFEN FUMARATE 1 DROP: 0.35 SOLUTION/ DROPS OPHTHALMIC at 10:30

## 2023-03-25 RX ADMIN — TRAMADOL HYDROCHLORIDE 100 MG: 50 TABLET, COATED ORAL at 10:29

## 2023-03-25 RX ADMIN — BRIMONIDINE TARTRATE 1 DROP: 2 SOLUTION OPHTHALMIC at 10:31

## 2023-03-25 RX ADMIN — KETOTIFEN FUMARATE 1 DROP: 0.35 SOLUTION/ DROPS OPHTHALMIC at 21:40

## 2023-03-25 RX ADMIN — Medication 10 ML: at 10:33

## 2023-03-25 ASSESSMENT — PAIN SCALES - GENERAL
PAINLEVEL_OUTOF10: 0
PAINLEVEL_OUTOF10: 8

## 2023-03-26 LAB
ANION GAP SERPL CALCULATED.3IONS-SCNC: 12 MMOL/L (ref 3–16)
BASOPHILS # BLD: 0.1 K/UL (ref 0–0.2)
BASOPHILS NFR BLD: 0.7 %
BUN SERPL-MCNC: 25 MG/DL (ref 7–20)
CALCIUM SERPL-MCNC: 8.5 MG/DL (ref 8.3–10.6)
CHLORIDE SERPL-SCNC: 103 MMOL/L (ref 99–110)
CO2 SERPL-SCNC: 19 MMOL/L (ref 21–32)
CREAT SERPL-MCNC: 1.3 MG/DL (ref 0.8–1.3)
DEPRECATED RDW RBC AUTO: 18.7 % (ref 12.4–15.4)
EOSINOPHIL # BLD: 0.2 K/UL (ref 0–0.6)
EOSINOPHIL NFR BLD: 2.9 %
GFR SERPLBLD CREATININE-BSD FMLA CKD-EPI: 54 ML/MIN/{1.73_M2}
GLUCOSE SERPL-MCNC: 86 MG/DL (ref 70–99)
HCT VFR BLD AUTO: 21.5 % (ref 40.5–52.5)
HCT VFR BLD AUTO: 23.8 % (ref 40.5–52.5)
HCT VFR BLD AUTO: 24.7 % (ref 40.5–52.5)
HGB BLD-MCNC: 7.2 G/DL (ref 13.5–17.5)
HGB BLD-MCNC: 7.8 G/DL (ref 13.5–17.5)
HGB BLD-MCNC: 8 G/DL (ref 13.5–17.5)
LYMPHOCYTES # BLD: 1.7 K/UL (ref 1–5.1)
LYMPHOCYTES NFR BLD: 21 %
MCH RBC QN AUTO: 33.6 PG (ref 26–34)
MCHC RBC AUTO-ENTMCNC: 33.4 G/DL (ref 31–36)
MCV RBC AUTO: 100.6 FL (ref 80–100)
MONOCYTES # BLD: 0.9 K/UL (ref 0–1.3)
MONOCYTES NFR BLD: 11.8 %
NEUTROPHILS # BLD: 5.1 K/UL (ref 1.7–7.7)
NEUTROPHILS NFR BLD: 63.6 %
PLATELET # BLD AUTO: 213 K/UL (ref 135–450)
PMV BLD AUTO: 7.7 FL (ref 5–10.5)
POTASSIUM SERPL-SCNC: 3.7 MMOL/L (ref 3.5–5.1)
RBC # BLD AUTO: 2.13 M/UL (ref 4.2–5.9)
SODIUM SERPL-SCNC: 134 MMOL/L (ref 136–145)
WBC # BLD AUTO: 8 K/UL (ref 4–11)

## 2023-03-26 PROCEDURE — 97530 THERAPEUTIC ACTIVITIES: CPT

## 2023-03-26 PROCEDURE — 36415 COLL VENOUS BLD VENIPUNCTURE: CPT

## 2023-03-26 PROCEDURE — 6370000000 HC RX 637 (ALT 250 FOR IP): Performed by: INTERNAL MEDICINE

## 2023-03-26 PROCEDURE — 2580000003 HC RX 258: Performed by: INTERNAL MEDICINE

## 2023-03-26 PROCEDURE — 6360000002 HC RX W HCPCS: Performed by: INTERNAL MEDICINE

## 2023-03-26 PROCEDURE — 80048 BASIC METABOLIC PNL TOTAL CA: CPT

## 2023-03-26 PROCEDURE — 2580000003 HC RX 258: Performed by: ORTHOPAEDIC SURGERY

## 2023-03-26 PROCEDURE — 85025 COMPLETE CBC W/AUTO DIFF WBC: CPT

## 2023-03-26 PROCEDURE — 85014 HEMATOCRIT: CPT

## 2023-03-26 PROCEDURE — 85018 HEMOGLOBIN: CPT

## 2023-03-26 PROCEDURE — 1200000000 HC SEMI PRIVATE

## 2023-03-26 PROCEDURE — 97110 THERAPEUTIC EXERCISES: CPT

## 2023-03-26 PROCEDURE — 94640 AIRWAY INHALATION TREATMENT: CPT

## 2023-03-26 PROCEDURE — 99233 SBSQ HOSP IP/OBS HIGH 50: CPT | Performed by: INTERNAL MEDICINE

## 2023-03-26 PROCEDURE — 6370000000 HC RX 637 (ALT 250 FOR IP)

## 2023-03-26 RX ADMIN — GABAPENTIN 200 MG: 100 CAPSULE ORAL at 10:02

## 2023-03-26 RX ADMIN — ESCITALOPRAM OXALATE 20 MG: 10 TABLET ORAL at 10:03

## 2023-03-26 RX ADMIN — ROSUVASTATIN CALCIUM 20 MG: 10 TABLET, FILM COATED ORAL at 21:21

## 2023-03-26 RX ADMIN — Medication 2 PUFF: at 15:27

## 2023-03-26 RX ADMIN — ACETAMINOPHEN 650 MG: 325 TABLET ORAL at 21:22

## 2023-03-26 RX ADMIN — Medication 10 ML: at 21:22

## 2023-03-26 RX ADMIN — TRAMADOL HYDROCHLORIDE 50 MG: 50 TABLET, COATED ORAL at 14:13

## 2023-03-26 RX ADMIN — DORZOLAMIDE HYDROCHLORIDE AND TIMOLOL MALEATE 1 DROP: 20; 5 SOLUTION/ DROPS OPHTHALMIC at 10:07

## 2023-03-26 RX ADMIN — CEFAZOLIN 2000 MG: 2 INJECTION, POWDER, FOR SOLUTION INTRAMUSCULAR; INTRAVENOUS at 21:28

## 2023-03-26 RX ADMIN — TRAMADOL HYDROCHLORIDE 100 MG: 50 TABLET, COATED ORAL at 21:21

## 2023-03-26 RX ADMIN — TAMSULOSIN HYDROCHLORIDE 0.4 MG: 0.4 CAPSULE ORAL at 21:24

## 2023-03-26 RX ADMIN — CEFAZOLIN 2000 MG: 2 INJECTION, POWDER, FOR SOLUTION INTRAMUSCULAR; INTRAVENOUS at 05:23

## 2023-03-26 RX ADMIN — LISINOPRIL 10 MG: 10 TABLET ORAL at 10:03

## 2023-03-26 RX ADMIN — FINASTERIDE 5 MG: 5 TABLET, FILM COATED ORAL at 10:02

## 2023-03-26 RX ADMIN — CARVEDILOL 12.5 MG: 6.25 TABLET, FILM COATED ORAL at 10:04

## 2023-03-26 RX ADMIN — BRIMONIDINE TARTRATE 1 DROP: 2 SOLUTION OPHTHALMIC at 21:23

## 2023-03-26 RX ADMIN — PANTOPRAZOLE SODIUM 40 MG: 40 TABLET, DELAYED RELEASE ORAL at 05:24

## 2023-03-26 RX ADMIN — CEFAZOLIN 2000 MG: 2 INJECTION, POWDER, FOR SOLUTION INTRAMUSCULAR; INTRAVENOUS at 14:20

## 2023-03-26 RX ADMIN — Medication 10 ML: at 10:04

## 2023-03-26 RX ADMIN — LATANOPROST 1 DROP: 50 SOLUTION/ DROPS OPHTHALMIC at 21:22

## 2023-03-26 RX ADMIN — CARVEDILOL 12.5 MG: 6.25 TABLET, FILM COATED ORAL at 21:22

## 2023-03-26 RX ADMIN — KETOTIFEN FUMARATE 1 DROP: 0.35 SOLUTION/ DROPS OPHTHALMIC at 21:22

## 2023-03-26 RX ADMIN — KETOTIFEN FUMARATE 1 DROP: 0.35 SOLUTION/ DROPS OPHTHALMIC at 10:06

## 2023-03-26 RX ADMIN — BUPROPION HYDROCHLORIDE 100 MG: 100 TABLET, EXTENDED RELEASE ORAL at 10:03

## 2023-03-26 RX ADMIN — DORZOLAMIDE HYDROCHLORIDE AND TIMOLOL MALEATE 1 DROP: 20; 5 SOLUTION/ DROPS OPHTHALMIC at 21:23

## 2023-03-26 RX ADMIN — GABAPENTIN 200 MG: 100 CAPSULE ORAL at 14:14

## 2023-03-26 RX ADMIN — BRIMONIDINE TARTRATE 1 DROP: 2 SOLUTION OPHTHALMIC at 10:07

## 2023-03-26 RX ADMIN — GABAPENTIN 200 MG: 100 CAPSULE ORAL at 21:22

## 2023-03-26 ASSESSMENT — PAIN DESCRIPTION - LOCATION: LOCATION: ARM

## 2023-03-26 ASSESSMENT — PAIN DESCRIPTION - DESCRIPTORS: DESCRIPTORS: ACHING

## 2023-03-26 ASSESSMENT — PAIN SCALES - GENERAL
PAINLEVEL_OUTOF10: 7
PAINLEVEL_OUTOF10: 9
PAINLEVEL_OUTOF10: 0

## 2023-03-26 ASSESSMENT — PAIN DESCRIPTION - ORIENTATION: ORIENTATION: RIGHT

## 2023-03-27 LAB
ABO + RH BLD: NORMAL
ANION GAP SERPL CALCULATED.3IONS-SCNC: 11 MMOL/L (ref 3–16)
BASOPHILS # BLD: 0 K/UL (ref 0–0.2)
BASOPHILS NFR BLD: 0.7 %
BLD GP AB SCN SERPL QL: NORMAL
BLOOD BANK DISPENSE STATUS: NORMAL
BLOOD BANK PRODUCT CODE: NORMAL
BPU ID: NORMAL
BUN SERPL-MCNC: 28 MG/DL (ref 7–20)
CALCIUM SERPL-MCNC: 8.4 MG/DL (ref 8.3–10.6)
CHLORIDE SERPL-SCNC: 100 MMOL/L (ref 99–110)
CO2 SERPL-SCNC: 20 MMOL/L (ref 21–32)
CREAT SERPL-MCNC: 1.3 MG/DL (ref 0.8–1.3)
DEPRECATED RDW RBC AUTO: 18.8 % (ref 12.4–15.4)
DESCRIPTION BLOOD BANK: NORMAL
EOSINOPHIL # BLD: 0.2 K/UL (ref 0–0.6)
EOSINOPHIL NFR BLD: 3.4 %
GFR SERPLBLD CREATININE-BSD FMLA CKD-EPI: 54 ML/MIN/{1.73_M2}
GLUCOSE SERPL-MCNC: 85 MG/DL (ref 70–99)
HCT VFR BLD AUTO: 20 % (ref 40.5–52.5)
HCT VFR BLD AUTO: 24.8 % (ref 40.5–52.5)
HGB BLD-MCNC: 6.7 G/DL (ref 13.5–17.5)
HGB BLD-MCNC: 7.8 G/DL (ref 13.5–17.5)
LYMPHOCYTES # BLD: 1.4 K/UL (ref 1–5.1)
LYMPHOCYTES NFR BLD: 23.9 %
MCH RBC QN AUTO: 33.4 PG (ref 26–34)
MCHC RBC AUTO-ENTMCNC: 33.4 G/DL (ref 31–36)
MCV RBC AUTO: 100 FL (ref 80–100)
MONOCYTES # BLD: 0.7 K/UL (ref 0–1.3)
MONOCYTES NFR BLD: 12.1 %
NEUTROPHILS # BLD: 3.6 K/UL (ref 1.7–7.7)
NEUTROPHILS NFR BLD: 59.9 %
PLATELET # BLD AUTO: 222 K/UL (ref 135–450)
PMV BLD AUTO: 7.5 FL (ref 5–10.5)
POTASSIUM SERPL-SCNC: 3.6 MMOL/L (ref 3.5–5.1)
RBC # BLD AUTO: 2 M/UL (ref 4.2–5.9)
SODIUM SERPL-SCNC: 131 MMOL/L (ref 136–145)
WBC # BLD AUTO: 6 K/UL (ref 4–11)

## 2023-03-27 PROCEDURE — 86850 RBC ANTIBODY SCREEN: CPT

## 2023-03-27 PROCEDURE — 85018 HEMOGLOBIN: CPT

## 2023-03-27 PROCEDURE — 85014 HEMATOCRIT: CPT

## 2023-03-27 PROCEDURE — 85025 COMPLETE CBC W/AUTO DIFF WBC: CPT

## 2023-03-27 PROCEDURE — 1200000000 HC SEMI PRIVATE

## 2023-03-27 PROCEDURE — 94640 AIRWAY INHALATION TREATMENT: CPT

## 2023-03-27 PROCEDURE — 6370000000 HC RX 637 (ALT 250 FOR IP)

## 2023-03-27 PROCEDURE — 36430 TRANSFUSION BLD/BLD COMPNT: CPT

## 2023-03-27 PROCEDURE — 80048 BASIC METABOLIC PNL TOTAL CA: CPT

## 2023-03-27 PROCEDURE — 86901 BLOOD TYPING SEROLOGIC RH(D): CPT

## 2023-03-27 PROCEDURE — P9016 RBC LEUKOCYTES REDUCED: HCPCS

## 2023-03-27 PROCEDURE — 6370000000 HC RX 637 (ALT 250 FOR IP): Performed by: INTERNAL MEDICINE

## 2023-03-27 PROCEDURE — 2580000003 HC RX 258: Performed by: INTERNAL MEDICINE

## 2023-03-27 PROCEDURE — 86900 BLOOD TYPING SEROLOGIC ABO: CPT

## 2023-03-27 PROCEDURE — 86923 COMPATIBILITY TEST ELECTRIC: CPT

## 2023-03-27 PROCEDURE — 6360000002 HC RX W HCPCS: Performed by: INTERNAL MEDICINE

## 2023-03-27 PROCEDURE — 36415 COLL VENOUS BLD VENIPUNCTURE: CPT

## 2023-03-27 PROCEDURE — 99233 SBSQ HOSP IP/OBS HIGH 50: CPT | Performed by: INTERNAL MEDICINE

## 2023-03-27 PROCEDURE — 2580000003 HC RX 258: Performed by: ORTHOPAEDIC SURGERY

## 2023-03-27 RX ORDER — SODIUM CHLORIDE 9 MG/ML
INJECTION, SOLUTION INTRAVENOUS PRN
Status: DISCONTINUED | OUTPATIENT
Start: 2023-03-27 | End: 2023-03-28 | Stop reason: HOSPADM

## 2023-03-27 RX ADMIN — ESCITALOPRAM OXALATE 20 MG: 10 TABLET ORAL at 08:40

## 2023-03-27 RX ADMIN — PANTOPRAZOLE SODIUM 40 MG: 40 TABLET, DELAYED RELEASE ORAL at 06:06

## 2023-03-27 RX ADMIN — Medication 10 ML: at 21:16

## 2023-03-27 RX ADMIN — TRAMADOL HYDROCHLORIDE 50 MG: 50 TABLET, COATED ORAL at 21:24

## 2023-03-27 RX ADMIN — GABAPENTIN 200 MG: 100 CAPSULE ORAL at 21:16

## 2023-03-27 RX ADMIN — TAMSULOSIN HYDROCHLORIDE 0.4 MG: 0.4 CAPSULE ORAL at 21:16

## 2023-03-27 RX ADMIN — LATANOPROST 1 DROP: 50 SOLUTION/ DROPS OPHTHALMIC at 21:19

## 2023-03-27 RX ADMIN — CEFAZOLIN 2000 MG: 2 INJECTION, POWDER, FOR SOLUTION INTRAMUSCULAR; INTRAVENOUS at 14:53

## 2023-03-27 RX ADMIN — KETOTIFEN FUMARATE 1 DROP: 0.35 SOLUTION/ DROPS OPHTHALMIC at 21:18

## 2023-03-27 RX ADMIN — CEFAZOLIN 2000 MG: 2 INJECTION, POWDER, FOR SOLUTION INTRAMUSCULAR; INTRAVENOUS at 21:33

## 2023-03-27 RX ADMIN — CEFAZOLIN 2000 MG: 2 INJECTION, POWDER, FOR SOLUTION INTRAMUSCULAR; INTRAVENOUS at 06:06

## 2023-03-27 RX ADMIN — GABAPENTIN 200 MG: 100 CAPSULE ORAL at 14:54

## 2023-03-27 RX ADMIN — KETOTIFEN FUMARATE 1 DROP: 0.35 SOLUTION/ DROPS OPHTHALMIC at 08:44

## 2023-03-27 RX ADMIN — Medication 2 PUFF: at 21:13

## 2023-03-27 RX ADMIN — DORZOLAMIDE HYDROCHLORIDE AND TIMOLOL MALEATE 1 DROP: 20; 5 SOLUTION/ DROPS OPHTHALMIC at 08:45

## 2023-03-27 RX ADMIN — DORZOLAMIDE HYDROCHLORIDE AND TIMOLOL MALEATE 1 DROP: 20; 5 SOLUTION/ DROPS OPHTHALMIC at 21:18

## 2023-03-27 RX ADMIN — FINASTERIDE 5 MG: 5 TABLET, FILM COATED ORAL at 08:41

## 2023-03-27 RX ADMIN — BRIMONIDINE TARTRATE 1 DROP: 2 SOLUTION OPHTHALMIC at 08:45

## 2023-03-27 RX ADMIN — BUPROPION HYDROCHLORIDE 100 MG: 100 TABLET, EXTENDED RELEASE ORAL at 08:40

## 2023-03-27 RX ADMIN — BRIMONIDINE TARTRATE 1 DROP: 2 SOLUTION OPHTHALMIC at 21:18

## 2023-03-27 RX ADMIN — ROSUVASTATIN CALCIUM 20 MG: 10 TABLET, FILM COATED ORAL at 21:16

## 2023-03-27 ASSESSMENT — PAIN SCALES - GENERAL
PAINLEVEL_OUTOF10: 0
PAINLEVEL_OUTOF10: 6
PAINLEVEL_OUTOF10: 6

## 2023-03-27 ASSESSMENT — PAIN DESCRIPTION - ORIENTATION
ORIENTATION: RIGHT
ORIENTATION: RIGHT

## 2023-03-27 ASSESSMENT — PAIN DESCRIPTION - LOCATION
LOCATION: SHOULDER
LOCATION: SHOULDER

## 2023-03-27 ASSESSMENT — PAIN DESCRIPTION - DESCRIPTORS
DESCRIPTORS: ACHING
DESCRIPTORS: STABBING;SHARP

## 2023-03-27 NOTE — ED PROVIDER NOTES
into both eyes 2 times daily      mometasone (ASMANEX) 220 MCG/ACT AEPB inhaler Inhale 1 puff into the lungs at bedtime Rinse mouth after use      polyethylene glycol (GLYCOLAX) 17 g packet Take 0.015 g by mouth daily (Mix 1 tablespoonful in 4-8 ounces of liquid)      rosuvastatin (CRESTOR) 40 MG tablet Take 20 mg by mouth every evening      selenium sulfide (SELSUN) 2.5 % lotion Apply topically three times a week Shampoo, use 3 x week , leave on for 5 minutes then rinse      Ubrogepant 100 MG TABS One tab by mouth at onset of migraine,do not exceed 200mg/24 hours. Ok to repeat in 2 hours if headache continues. pantoprazole (PROTONIX) 40 MG tablet Take 40 mg by mouth every morning (before breakfast)      escitalopram (LEXAPRO) 20 MG tablet Take 20 mg by mouth daily      aspirin 81 MG chewable tablet Take 1 tablet by mouth daily  Qty: 30 tablet, Refills: 0      carvedilol (COREG) 12.5 MG tablet Take 12.5 mg by mouth 2 times daily       lisinopril (PRINIVIL;ZESTRIL) 20 MG tablet Take 10 mg by mouth daily      latanoprost (XALATAN) 0.005 % ophthalmic solution Place 1 drop into both eyes nightly             ALLERGIES     Pcn [penicillins], Percocet [oxycodone-acetaminophen], and Vicodin [hydrocodone-acetaminophen]    FAMILYHISTORY     History reviewed. No pertinent family history.      SOCIAL HISTORY       Social History     Tobacco Use    Smoking status: Former    Smokeless tobacco: Never   Vaping Use    Vaping Use: Never used   Substance Use Topics    Alcohol use: Not Currently     Comment: Recovering alcoholic; none for 45 years    Drug use: No       SCREENINGS        Travis Coma Scale  Eye Opening: Spontaneous  Best Verbal Response: Oriented  Best Motor Response: Obeys commands  Travis Coma Scale Score: 15                CIWA Assessment  BP: 134/61  Heart Rate: 75           PHYSICAL EXAM  1 or more Elements     ED Triage Vitals [03/21/23 1703]   BP Temp Temp Source Heart Rate Resp SpO2 Height Weight   (!)
normal limits   HEMOGLOBIN AND HEMATOCRIT - Abnormal; Notable for the following components:    Hemoglobin 8.0 (*)     Hematocrit 24.7 (*)     All other components within normal limits   COVID-19, RAPID   PROCALCITONIN   IRON AND TIBC   VITAMIN B12 & FOLATE   TYPE AND SCREEN   PREPARE RBC (CROSSMATCH)         PLAN:   -Patient seen and evaluated. Relevant records reviewed. The emergency department, patient seen and evaluated. Vital signs stable. Did hit his head, fell backwards. No blood thinners. No signs of significant head trauma. CT head, CT cervical spine without acute problem. However, on exam, concern for proximal humerus fracture. Demonstrated on imaging. Of note, patient recently had a left shoulder surgery, and given his new right shoulder fracture, unlikely to be able to care for himself at home. Orthopedics has been consulted. Admitted per PA documentation.       Medications   sodium chloride flush 0.9 % injection 5-40 mL (5 mLs IntraVENous Not Given 3/27/23 1142)   sodium chloride flush 0.9 % injection 5-40 mL (has no administration in time range)   0.9 % sodium chloride infusion (has no administration in time range)   meperidine (DEMEROL) injection 12.5 mg (has no administration in time range)   HYDROmorphone (DILAUDID) injection 0.25 mg (has no administration in time range)   HYDROmorphone (DILAUDID) injection 0.5 mg (has no administration in time range)   0.9 % sodium chloride infusion ( IntraVENous Rate/Dose Verify 3/26/23 0052)   ondansetron (ZOFRAN-ODT) disintegrating tablet 4 mg (has no administration in time range)     Or   ondansetron (ZOFRAN) injection 4 mg (has no administration in time range)   traMADol (ULTRAM) tablet 50 mg (50 mg Oral Given 3/26/23 1413)   traMADol (ULTRAM) tablet 100 mg (100 mg Oral Given 3/26/23 2121)   brimonidine (ALPHAGAN) 0.2 % ophthalmic solution 1 drop (1 drop Both Eyes Given 3/27/23 8963)   buPROPion [facility]) extended release tablet 100 mg (100

## 2023-03-28 VITALS
DIASTOLIC BLOOD PRESSURE: 63 MMHG | BODY MASS INDEX: 23.96 KG/M2 | OXYGEN SATURATION: 95 % | WEIGHT: 161.8 LBS | TEMPERATURE: 98.7 F | SYSTOLIC BLOOD PRESSURE: 113 MMHG | HEIGHT: 69 IN | RESPIRATION RATE: 18 BRPM | HEART RATE: 67 BPM

## 2023-03-28 PROBLEM — D62 ABLA (ACUTE BLOOD LOSS ANEMIA): Status: ACTIVE | Noted: 2023-03-28

## 2023-03-28 PROBLEM — E43 SEVERE PROTEIN-CALORIE MALNUTRITION (HCC): Status: ACTIVE | Noted: 2023-03-28

## 2023-03-28 LAB
BASOPHILS # BLD: 0 K/UL (ref 0–0.2)
BASOPHILS NFR BLD: 0.6 %
DEPRECATED RDW RBC AUTO: 18.8 % (ref 12.4–15.4)
EOSINOPHIL # BLD: 0.2 K/UL (ref 0–0.6)
EOSINOPHIL NFR BLD: 3.6 %
HCT VFR BLD AUTO: 22.4 % (ref 40.5–52.5)
HGB BLD-MCNC: 7.6 G/DL (ref 13.5–17.5)
LYMPHOCYTES # BLD: 1.2 K/UL (ref 1–5.1)
LYMPHOCYTES NFR BLD: 20.6 %
MCH RBC QN AUTO: 32.8 PG (ref 26–34)
MCHC RBC AUTO-ENTMCNC: 33.9 G/DL (ref 31–36)
MCV RBC AUTO: 96.8 FL (ref 80–100)
MONOCYTES # BLD: 0.7 K/UL (ref 0–1.3)
MONOCYTES NFR BLD: 11.3 %
NEUTROPHILS # BLD: 3.7 K/UL (ref 1.7–7.7)
NEUTROPHILS NFR BLD: 63.9 %
PLATELET # BLD AUTO: 239 K/UL (ref 135–450)
PMV BLD AUTO: 7.5 FL (ref 5–10.5)
RBC # BLD AUTO: 2.31 M/UL (ref 4.2–5.9)
WBC # BLD AUTO: 5.8 K/UL (ref 4–11)

## 2023-03-28 PROCEDURE — 85025 COMPLETE CBC W/AUTO DIFF WBC: CPT

## 2023-03-28 PROCEDURE — 2580000003 HC RX 258: Performed by: INTERNAL MEDICINE

## 2023-03-28 PROCEDURE — APPNB60 APP NON BILLABLE TIME 46-60 MINS: Performed by: PHYSICIAN ASSISTANT

## 2023-03-28 PROCEDURE — 6370000000 HC RX 637 (ALT 250 FOR IP): Performed by: INTERNAL MEDICINE

## 2023-03-28 PROCEDURE — 94761 N-INVAS EAR/PLS OXIMETRY MLT: CPT

## 2023-03-28 PROCEDURE — 6370000000 HC RX 637 (ALT 250 FOR IP)

## 2023-03-28 PROCEDURE — 36415 COLL VENOUS BLD VENIPUNCTURE: CPT

## 2023-03-28 PROCEDURE — 6360000002 HC RX W HCPCS: Performed by: INTERNAL MEDICINE

## 2023-03-28 RX ORDER — TRAMADOL HYDROCHLORIDE 50 MG/1
50 TABLET ORAL EVERY 6 HOURS PRN
Qty: 28 TABLET | Refills: 0 | Status: SHIPPED | OUTPATIENT
Start: 2023-03-28 | End: 2023-04-04

## 2023-03-28 RX ADMIN — KETOTIFEN FUMARATE 1 DROP: 0.35 SOLUTION/ DROPS OPHTHALMIC at 10:12

## 2023-03-28 RX ADMIN — PANTOPRAZOLE SODIUM 40 MG: 40 TABLET, DELAYED RELEASE ORAL at 05:25

## 2023-03-28 RX ADMIN — GABAPENTIN 200 MG: 100 CAPSULE ORAL at 14:01

## 2023-03-28 RX ADMIN — ESCITALOPRAM OXALATE 20 MG: 10 TABLET ORAL at 10:00

## 2023-03-28 RX ADMIN — FINASTERIDE 5 MG: 5 TABLET, FILM COATED ORAL at 10:06

## 2023-03-28 RX ADMIN — DORZOLAMIDE HYDROCHLORIDE AND TIMOLOL MALEATE 1 DROP: 20; 5 SOLUTION/ DROPS OPHTHALMIC at 10:12

## 2023-03-28 RX ADMIN — CEFAZOLIN 2000 MG: 2 INJECTION, POWDER, FOR SOLUTION INTRAMUSCULAR; INTRAVENOUS at 14:01

## 2023-03-28 RX ADMIN — LISINOPRIL 10 MG: 10 TABLET ORAL at 10:00

## 2023-03-28 RX ADMIN — CARVEDILOL 12.5 MG: 6.25 TABLET, FILM COATED ORAL at 10:01

## 2023-03-28 RX ADMIN — GABAPENTIN 200 MG: 100 CAPSULE ORAL at 10:00

## 2023-03-28 RX ADMIN — BUPROPION HYDROCHLORIDE 100 MG: 100 TABLET, EXTENDED RELEASE ORAL at 10:01

## 2023-03-28 RX ADMIN — BRIMONIDINE TARTRATE 1 DROP: 2 SOLUTION OPHTHALMIC at 10:12

## 2023-03-28 RX ADMIN — CEFAZOLIN 2000 MG: 2 INJECTION, POWDER, FOR SOLUTION INTRAMUSCULAR; INTRAVENOUS at 05:24

## 2023-03-28 RX ADMIN — ACETAMINOPHEN 650 MG: 325 TABLET ORAL at 00:33

## 2023-03-28 ASSESSMENT — PAIN DESCRIPTION - LOCATION: LOCATION: SHOULDER

## 2023-03-28 ASSESSMENT — PAIN DESCRIPTION - DESCRIPTORS: DESCRIPTORS: ACHING

## 2023-03-28 ASSESSMENT — PAIN SCALES - GENERAL: PAINLEVEL_OUTOF10: 8

## 2023-03-28 ASSESSMENT — PAIN DESCRIPTION - ORIENTATION: ORIENTATION: RIGHT

## 2023-03-28 NOTE — FLOWSHEET NOTE
03/25/23 2130   Vital Signs   Temp 97.3 °F (36.3 °C)   Temp Source Oral   Heart Rate 79   Heart Rate Source Monitor   Resp 18   /60   MAP (Calculated) 74   BP Location Left upper arm   BP Method Automatic   Patient Position Sitting   Level of Consciousness 0   MEWS Score 1   Pain Assessment   Pain Assessment None - Denies Pain   Pain Level 0   Oxygen Therapy   SpO2 97 %   O2 Device None (Room air)   HS assessment completed. No complaints of pain or discomfort voiced. No signs of symptoms of distress noted. Patient tolerated night medications well. Respirations easy and even. Bed in lowest position, bed alarm in place and functioning properly, bed rails x2 up,  Call light within reach. Bedside Mobility Assessment Tool (BMAT):     Assessment Level 1- Sit and Shake    1. From a semi-reclined position, ask patient to sit up and rotate to a seated position at the side of the bed. Can use the bedrail. 2. Ask patient to reach out and grab your hand and shake making sure patient reaches across his/her midline. Pass- Patient is able to come to a seated position, maintain core strength. Maintains seated balance while reaching across midline. Move on to Assessment Level 2. Assessment Level 2- Stretch and Point   1. With patient in seated position at the side of the bed, have patient place both feet on the floor (or stool) with knees no higher than hips. 2. Ask patient to stretch one leg and straighten the knee, then bend the ankle/flex and point the toes. If appropriate, repeat with the other leg. Pass- Patient is able to demonstrate appropriate quad strength on intended weight bearing limb(s). Move onto Assessment Level 3. Assessment Level 3- Stand   1. Ask patient to elevate off the bed or chair (seated to standing) using an assistive device (cane, bedrail). 2. Patient should be able to raise buttocks off be and hold for a count of five. May repeat once.    Fail- Patient unable to demonstrate
03/26/23 0907   Vital Signs   Temp 98.5 °F (36.9 °C)   Temp Source Oral   Heart Rate 95   Heart Rate Source Monitor   Resp 16   BP (!) 97/53   MAP (Calculated) 68   BP Location Left upper arm   BP Method Automatic   Patient Position Semi fowlers   Level of Consciousness 0   MEWS Score 2   Oxygen Therapy   SpO2 97 %   O2 Device None (Room air)      03/26/23 0907   Vital Signs   Temp 98.5 °F (36.9 °C)   Temp Source Oral   Heart Rate 95   Heart Rate Source Monitor   Resp 16   BP (!) 97/53   MAP (Calculated) 68   BP Location Left upper arm   BP Method Automatic   Patient Position Semi fowlers   Level of Consciousness 0   MEWS Score 2   Oxygen Therapy   SpO2 97 %   O2 Device None (Room air)   Pt assessment completed, vss, see flow sheet. Pt alert and oriented x 4. Pt denies ant other needs at this time.  Clarice Urban RN
03/27/23 0830   Vital Signs   Temp 98.7 °F (37.1 °C)   Temp Source Oral   Heart Rate 74   Heart Rate Source Monitor   Resp 18   BP (!) 94/53   MAP (Calculated) 67   BP Location Left upper arm   BP Method Automatic   Level of Consciousness 0   MEWS Score 2   Pain Assessment   Pain Assessment None - Denies Pain   Oxygen Therapy   SpO2 96 %   O2 Device None (Room air)     Shift assessment complete. See flow sheet. Scheduled meds given. See MAR. Patients head-toe complete, VS are logged, and active bowel sound noted in all four quadrants. Pt alert and oriented x4. Sitting up in bed respirations easy and unlabored. No s/s of distress. Denies pain or SOB. Stable on RA. No s/s of active bleeding around surgical incision. Pt's HBG is 6.7  Transfusion orders are in waiting on type and screen and blood bank to send blood. Pt denies any further needs states he is feeling pretty good this AM.     No further needs  noted at this time. Call light and bedside table are within reach. The bed is locked and is in the lowest position.         Kehinde Webb RN
03/28/23 0945   Vital Signs   Temp 98.7 °F (37.1 °C)   Temp Source Oral   Heart Rate 75   Heart Rate Source Monitor   Resp 16   BP (!) 120/59   MAP (Calculated) 79   Level of Consciousness 0   MEWS Score 1   Pain Assessment   Pain Assessment None - Denies Pain   Oxygen Therapy   SpO2 95 %   O2 Device None (Room air)     Shift assessment complete. See flow sheet. Scheduled meds given. See MAR. Patients head-toe complete, VS are logged, and active bowel sound noted in all four quadrants. Pt sitting up in bed respirations easy and unlabored. No s/s of distress. Alert and oriented. Tele sitter in room. Pt denies SOB or pain at this time. VSS. No further needs. Pt refusing to be turned or repositioned stated he can do it himself. No further needs  noted at this time. Call light and bedside table are within reach. The bed is locked and is in the lowest position.         Cyrus Coleman RN
Pt A/Ox4. VSS. Acute pain 8/10, see assessment below. Pt unlabored; respirations even, regular, effortless. RA. No distress noted. Adventitious lung sound noted upon ausculation, crackles noted to RLL. Sling in place to right arm. Shift assessment complete. See flowsheet. AM med's given. PRN tramadol 100 mg given as prescribed for pain. See MAR. Wife at beside. Denies needs at this time. Side rails 2/4. Bed alarm on. Telemetry remains in place. Bed in low position. Call light within reach. 03/22/23 0915   Vital Signs   Temp 97.7 °F (36.5 °C)   Temp Source Oral   Heart Rate 64   Heart Rate Source Monitor   Resp 18   /64   MAP (Calculated) 84   BP Location Left upper arm   BP Method Automatic   Patient Position High fowlers   Level of Consciousness 0   MEWS Score 1   Pain Assessment   Pain Assessment 0-10   Pain Level 8   Patient's Stated Pain Goal 0 - No pain   Pain Location Arm; Shoulder   Pain Orientation Right   Pain Descriptors Sharp; Tender   Pain Type Acute pain   Pain Frequency Continuous   Pain Onset On-going   Non-Pharmaceutical Pain Intervention(s) Other (Comment)  (sling)   Care Plan - Pain Goals   Verbalizes/displays adequate comfort level or baseline comfort level Encourage patient to monitor pain and request assistance;Assess pain using appropriate pain scale; Administer analgesics based on type and severity of pain and evaluate response;Implement non-pharmacological measures as appropriate and evaluate response   Opioid-Induced Sedation   POSS Score 1   Oxygen Therapy   SpO2 98 %   O2 Device None (Room air)
a patient from stepping backwards, use your best clinical judgement. Fail- Patient not able to complete tasks OR requires use of assistive device. Patient is MOBILITY LEVEL 3.        Mobility Level- 2
patient to march in place at bedside. 2. Then ask patient to advance step and return each foot. Some medical conditions may render a patient from stepping backwards, use your best clinical judgement. Fail- Patient not able to complete tasks OR requires use of assistive device. Patient is MOBILITY LEVEL 3.        Mobility Level- 1

## 2023-03-28 NOTE — PROGRESS NOTES
1550: Pt unable to sign informed consent d/t injury, requested wife to sign for him. Received telephone consent from wife Jossie Tyler. T/C verified by Lulu Obando RN. Informed consent placed in chart for tomorrow.
4 Eyes Skin Assessment     The patient is being assess for   Admission    I agree that 2 RN's have performed a thorough Head to Toe Skin Assessment on the patient. ALL assessment sites listed below have been assessed. Areas assessed for pressure by both nurses:   [x]   Head, Face, and Ears   [x]   Shoulders, Back, and Chest, Abdomen  [x]   Arms, Elbows, and Hands   [x]   Coccyx, Sacrum, and Ischium  [x]   Legs, Feet, and Heels        Skin Assessed Under all Medical Devices by both nurses:                  All Mepilex Borders were peeled back and area peeked at by both nurses:  No:    Please list where Mepilex Borders are located:  n/a             **SHARE this note so that the co-signing nurse is able to place an eSignature**    Co-signer eSignature: Electronically signed by Hermelinda Salmon RN on 3/22/23 at 1:10 AM EDT    Does the Patient have Skin Breakdown related to pressure?   No     (Insert Photo here n/a)         Sage Prevention initiated:  No   Wound Care Orders initiated:  NA      [facility] nurse consulted for Pressure Injury (Stage 3,4, Unstageable, DTI, NWPT, Complex wounds)and New or Established Ostomies:  NA      Primary Nurse eSignature: Electronically signed by Michelle Mcdaniels RN on 3/22/23 at 1:09 AM EDT
Awake all night. Confused to place & time. Pt left his external cath on for now. Tele sitter in place & bed check on. Frequent checks made on pt for safety.
BP 84/55, HR 78; MD notified at this time.
Called to pt room , pt in chair and blood on pad in chair. Removed pt sling to right arm with charge PATTI Andrews and found ID dressings dry and intact x2. Pt had bleeding from lower right arm a small possible skin tear. Clinical came to room also. Pt had full feeling in fingers and a good radial pulse. Wrapped arm with ABD pads and gauze.
Cannot perfectserve ilana. Pt states joel saw him this morning but did not look at arm. Sent another message to dr Kulwant Aviles about seeing pt so we can d/c if possible.
Comprehensive Nutrition Assessment    Type and Reason for Visit:  Initial (LOSx7 days)    Nutrition Recommendations/Plan:   Continue current regular diet  Add Ensure High Protein w/ meals  Continue to monitor wound healing, weight trends, & nutrition related lab values     Malnutrition Assessment:  Malnutrition Status:  Severe malnutrition (03/28/23 1216)    Context:  Acute Illness     Findings of the 6 clinical characteristics of malnutrition:  Energy Intake:  75% or less of estimated energy requirements for 7 or more days  Weight Loss:  No significant weight loss     Body Fat Loss: Moderate body fat loss Orbital   Muscle Mass Loss: Moderate muscle mass loss Clavicles (pectoralis & deltoids), Temples (temporalis)  Fluid Accumulation:  No significant fluid accumulation     Strength:  Not Performed    Nutrition Assessment:    Pt is nutritionally compromised AEB decreased appetite & PO intake PTA. Pt is at risk for further nutritional compromise d/t altered nutrition related lab values & muscle/fat wasting. Will continue current adult; regular diet. Will add Ensure high protein w/ breakfast & dinner meals. Nutrition Related Findings:    Pt is alert & oriented x4 & was pleasant during assessment. Skin is warm, dry, & pink; pt has some missing teeth & reports no issues w/ chewing or swallowing; abdomen is soft/nontender; bowels are active & last BM was yesterday (3/27) - pt said he was having constipation at the beginning of admission but that it is starting to resolve. Pt reported that he had a decreased appetite & PO intake PTA that had been ongoing since last July (2022) after he had COVID - pt said that he has gradually lost weight over the last year; pt said that his appetite has been okay during admission - he is very fond of the food here at the hospital & kept complimenting it throughout the assessment.  Pt said that prior to his decrease in appetite r/t COVID, he stayed around ~180 lbs - pt's wife was
Dressings to R arm x 2 mepilex d/I. Circ checks are adequate.
HS assessment completed. No complaints of pain or discomfort voiced. No signs of symptoms of distress noted. Patient tolerated night medications well. Respirations easy and even. Bed in lowest position, bed alarm in place and functioning properly, bed rails x2 up,  Call light within reach. Bedside Mobility Assessment Tool (BMAT):     Assessment Level 1- Sit and Shake    1. From a semi-reclined position, ask patient to sit up and rotate to a seated position at the side of the bed. Can use the bedrail. 2. Ask patient to reach out and grab your hand and shake making sure patient reaches across his/her midline. Fail- Patient is unable to perform tasks, patient is MOBILITY LEVEL 1. Assessment Level 2- Stretch and Point   1. With patient in seated position at the side of the bed, have patient place both feet on the floor (or stool) with knees no higher than hips. 2. Ask patient to stretch one leg and straighten the knee, then bend the ankle/flex and point the toes. If appropriate, repeat with the other leg. Fail- Patient is unable to complete task. Patient is MOBILITY LEVEL 2. Assessment Level 3- Stand   1. Ask patient to elevate off the bed or chair (seated to standing) using an assistive device (cane, bedrail). 2. Patient should be able to raise buttocks off be and hold for a count of five. May repeat once. Fail- Patient unable to demonstrate standing stability. Patient is MOBILITY LEVEL 3. Assessment Level 4- Walk   1. Ask patient to march in place at bedside. 2. Then ask patient to advance step and return each foot. Some medical conditions may render a patient from stepping backwards, use your best clinical judgement. Fail- Patient not able to complete tasks OR requires use of assistive device. Patient is MOBILITY LEVEL 3.        Mobility Level- 2
IM Progress Note    Admit Date:  3/21/2023    -presented with right arm pain after fall   -found to have right humerus fracture   -IM nailing done yesterday - 3/23     POD #1    Subjective:  Mr. Macie Plaza is awake alert oriented now. Right arm in sling. Pain is controlled. No distress. Seen by PT OT, needs SNF placement    Objective:   Patient Vitals for the past 4 hrs:   BP Temp Temp src Pulse SpO2   03/24/23 1540 -- -- -- -- 94 %   03/24/23 1345 (!) 140/73 99.2 °F (37.3 °C) Oral 93 93 %          Intake/Output Summary (Last 24 hours) at 3/24/2023 1600  Last data filed at 3/24/2023 1355  Gross per 24 hour   Intake 410 ml   Output 1200 ml   Net -790 ml       Physical Exam:  Gen: No distress. Alert. Awake and well-oriented. Eyes: PERRL. No sclera icterus. No conjunctival injection. ENT: No discharge. Pharynx clear. Neck: No JVD. Trachea midline. Resp: No accessory muscle use. No crackles. No wheezes. No rhonchi. CV: Regular rate. Regular rhythm. No murmur. No rub. No edema. Capillary Refill: Brisk,< 3 seconds   Peripheral Pulses: +2 palpable, equal bilaterally   GI: Non-tender. Non-distended. Normal bowel sounds. Skin: Warm and dry. No nodule on exposed extremities. No rash on exposed extremities. M/S: No cyanosis. No joint deformity. No clubbing. Right arm in sling  Neuro: Awake. Grossly nonfocal    Psych: Oriented x 3. No anxiety or agitation.          Medications:  brimonidine, 1 drop, BID  buPROPion, 100 mg, Daily  dorzolamide-timolol, 1 drop, BID  carvedilol, 12.5 mg, BID  escitalopram, 20 mg, Daily  finasteride, 5 mg, Daily  gabapentin, 200 mg, TID  ketotifen, 1 drop, BID  latanoprost, 1 drop, Nightly  lisinopril, 10 mg, Daily  [START ON 3/25/2023] pantoprazole, 40 mg, QAM AC  rosuvastatin, 20 mg, QPM  tamsulosin, 0.4 mg, Nightly  sodium chloride flush, 5-40 mL, 2 times per day  sodium chloride flush, 5-40 mL, 2 times per day    PRN Medications:  albuterol sulfate HFA, 2 puff, Q4H
IV removed and discharge instructions completed. All questions were answered to patients satisfaction. Request for transport placed, all personal belongs packed. No further needs noted.
Knows month year name. Not sure where he is. Knows he broke arm but didn't remember surgery/remembers that he was out of it though. 3 cane 1 walker and rollator. Prior to this accident was starting to get around with no assist. Right arm more sensitive than left arm. Am assessment completed see flow sheet. Pt denies any pain/ needs at this time. Call light within reach. Will continue to monitor. Bedside Mobility Assessment Tool (BMAT):     Assessment Level 1- Sit and Shake    1. From a semi-reclined position, ask patient to sit up and rotate to a seated position at the side of the bed. Can use the bedrail. 2. Ask patient to reach out and grab your hand and shake making sure patient reaches across his/her midline. Fail- Patient is unable to perform tasks, patient is MOBILITY LEVEL 1. Assessment Level 2- Stretch and Point   1. With patient in seated position at the side of the bed, have patient place both feet on the floor (or stool) with knees no higher than hips. 2. Ask patient to stretch one leg and straighten the knee, then bend the ankle/flex and point the toes. If appropriate, repeat with the other leg. Fail- Patient is unable to complete task. Patient is MOBILITY LEVEL 2. Assessment Level 3- Stand   1. Ask patient to elevate off the bed or chair (seated to standing) using an assistive device (cane, bedrail). 2. Patient should be able to raise buttocks off be and hold for a count of five. May repeat once. Fail- Patient unable to demonstrate standing stability. Patient is MOBILITY LEVEL 3. Assessment Level 4- Walk   1. Ask patient to march in place at bedside. 2. Then ask patient to advance step and return each foot. Some medical conditions may render a patient from stepping backwards, use your best clinical judgement. Fail- Patient not able to complete tasks OR requires use of assistive device. Patient is MOBILITY LEVEL 3.        Mobility Level- 2
MD notified of Hgb and hematocrit.
ORTHO NOTE     S: Pain control improving. He is moving his hand and wrist much better today. Got blood yesterday and Hgb last two draws has been stable. O:  Vitals:    03/27/23 2023 03/27/23 2115 03/28/23 0239 03/28/23 0945   BP: 126/76  131/66 (!) 120/59   Pulse: 90 78 73 75   Resp: 20 18 18 16   Temp: 98.7 °F (37.1 °C)  97.9 °F (36.6 °C) 98.7 °F (37.1 °C)   TempSrc: Oral  Oral Oral   SpO2: 93% 93% 92% 95%   Weight:       Height:         Right upper extremity:  Mepilex dressings/surgical sites are clean, dry, and intact. No shadowing or saturation. Extensive bruising/ecchymosis mostly about the posterior arm extending to the scapula and posterior humerus. Swelling/fullness involving posterior arm seems somewhat improved this morning from when I saw him over the wknd. There is also ecchymosis in the antecubital fossa. Swelling is focal to the shoulder. Dressing overlying the forearm does not demonstrate any saturation or drainage. There is minimal trace edema to the remainder of the upper extremity. Compartments are soft and compressible. There is fullness about the posterior upper arm in the region of the triceps origin consistent with hematoma formation, this is improved. Elbow range of motion is limited secondary to pain but improved from wknd. Sensation is intact to light touch in median, radial, ulnar, and axillary distributions. Motor function is intact to AIN, PIN, ulnar motor nerves. There is a strong palpable radial pulse, and brisk capillary refill to the fingers. Moving hand and fingers much better today. Labs:  WBC 5.8  Hemoglobin 7.6  Platelets 748       Radiographic exam: No new x-rays obtained today. A/P:  POD #5 status post right humeral shaft fracture intramedullary nailing     Nonweightbearing right upper extremity  Sling  May come out of sling for hygiene purposes and active elbow hand and wrist motion.   Due to the patient's right hand dominance, would allow him to
Occupational Therapy  Inpatient Occupational Therapy Treatment    Unit: [facility]  Date:  3/26/2023  Patient Name:    Cleveland Adame  Admitting diagnosis:  History of humerus fracture [Z87.81]  Closed fracture of proximal end of right humerus, unspecified fracture morphology, initial encounter Dwight Flower  Admit Date:  3/21/2023  Precautions/Restrictions/WB Status/ Lines/ Wounds/ Oxygen:  Fall risk, Bed/chair alarm, WB Restrictions (Nonweightbearing right upper extremity), and Telesitter  Sling  May come out of sling for hygiene purposes and active elbow hand and wrist motion. Due to the patient's right hand dominance, would allow him to perform light activities of daily living such as eating with the right upper extremity. May advance left upper extremity weightbearing for functional use  Left shoulder range of motion as tolerated. Treatment Time:  4542-7204  Treatment Number:  3  Timed Code Treatment Minutes: 45 minutes  Total Treatment Minutes:  45  minutes    Patient Goals for Therapy: None stated          Discharge Recommendations: SNF  DME needs for discharge: Defer to facility         Therapy recommendations for staff:   Assist of 2 for stand-pivot transfers    History of Present Illness:  Per H&P  79 y.o. male who presented to the hospital with a chief complaint of right arm pain and fall. Patient was at home today when he suffered a mechanical fall. Patient states he was upstairs trying to reach the chair rail and his legs gave out underneath him. He fell landing on his right side. Developed pretty significant pain and a mild deformity of his right upper extremity. In emergency department he was found to have a right humeral fracture. Patient is a recovering alcoholic and in January had a fracture of his left humerus as well. He had been progressing with his rehab and has suffered a setback as well.   Orthopedic surgery was consulted in the emergency department and will see the patient in
Occupational Therapy  Inpatient Occupational Therapy Treatment    Unit: [facility]  Date:  3/25/2023  Patient Name:    Bin Patrick  Admitting diagnosis:  History of humerus fracture [Z87.81]  Closed fracture of proximal end of right humerus, unspecified fracture morphology, initial encounter Reg Holman  Admit Date:  3/21/2023  Precautions/Restrictions/WB Status/ Lines/ Wounds/ Oxygen:  Fall risk, Bed/chair alarm, WB Restrictions (Nonweightbearing right upper extremity), and Telesitter  Sling  May come out of sling for hygiene purposes and active elbow hand and wrist motion. Due to the patient's right hand dominance, would allow him to perform light activities of daily living such as eating with the right upper extremity. May advance left upper extremity weightbearing for functional use  Left shoulder range of motion as tolerated. Treatment Time:  8320-2537  Treatment Number:  2  Timed Code Treatment Minutes: 30 minutes  Total Treatment Minutes:  30  minutes    Patient Goals for Therapy: None stated          Discharge Recommendations: SNF  DME needs for discharge: Defer to facility         Therapy recommendations for staff:   Assist of 2 for stand-pivot transfers    History of Present Illness:  Per H&P  79 y.o. male who presented to the hospital with a chief complaint of right arm pain and fall. Patient was at home today when he suffered a mechanical fall. Patient states he was upstairs trying to reach the chair rail and his legs gave out underneath him. He fell landing on his right side. Developed pretty significant pain and a mild deformity of his right upper extremity. In emergency department he was found to have a right humeral fracture. Patient is a recovering alcoholic and in January had a fracture of his left humerus as well. He had been progressing with his rehab and has suffered a setback as well.   Orthopedic surgery was consulted in the emergency department and will see the patient in
Occupational Therapy/ Physical Therapy  Pts H&H is 6.7 and the pt is to get blood today. Will hold Therapy today.   Marianne Merino OTR/L [place], [place], [address] [facility] [id number]
Ortho stated that swelling seemed normal for procedure. Could order duplex us if concerned for DVT. Dr Nneka Harris will wait one more day to discharge pt.
Patient currently resting in bed with eyes closed. Alert and oriented x 4. On room air and in no distress. Sinus rhythm on telemetry. Sling and swath in place on RUE. Neuro checks intact  in RUE. External male cath in place. Patient pleasant and cooperative with care. Bed alarm on for patient safety. Nurse will continue to monitor/reassess. Call light within reach.
Patient currently resting in bed with eyes closed. On room air and in no distress. When awake alert and oriented x 4. Tylenol and Tramadol given for right should pain with effective results. External pure wick cath in place and draining yellow urine. Pleasant and cooperative with care. Nurse will continue to monitor/reassess. Call light within reach.
Physical Therapy/Occupational Therapy    Order received and chart reviewed. Pt with R comminuted displaced proximal humeral shaft fracture. Ortho recommending surgical intervention with plans for procedure on 3/23. Please re-order PT/OT when pt is medically appropriate for therapy post-surgery. No Charge.     Miya Quiros, PT, DPT, OMT-C # [id number]  Lina Pel, OTR/L
Progress Note    Admit Date:  3/21/2023    -pmhx of alcohol abuse  -presented with right arm pain after fall   -found to have right humerus fracture     Subjective:  Mr. Duc Jernigan today is seen sitting up in bed. Doing well, pain is controlled for now. He is in sling. Objective:   Patient Vitals for the past 4 hrs:   BP Temp Temp src Pulse Resp SpO2   03/22/23 0915 124/64 97.7 °F (36.5 °C) Oral 64 18 98 %          Intake/Output Summary (Last 24 hours) at 3/22/2023 0930  Last data filed at 3/22/2023 3237  Gross per 24 hour   Intake 5 ml   Output 300 ml   Net -295 ml       Physical Exam:    Gen: No distress. Alert. Pleasant elderly male   Eyes: PERRL. No sclera icterus. No conjunctival injection. ENT: No discharge. Pharynx clear. Neck: No JVD. Trachea midline. Resp: No accessory muscle use. No wheezes. No rhonchi. +mild trace crackles in lung bases   CV: Regular rate. Regular rhythm. + murmur. No rub. No edema. Peripheral Pulses: +2 palpable, equal bilaterally   GI: Non-tender. Non-distended. Normal bowel sounds. Skin: Warm and dry. No nodule on exposed extremities. No rash on exposed extremities. M/S: No cyanosis. No joint deformity. No clubbing. +right arm in sling, no tenderness to palpation of right shoulder joint   Neuro: Awake. Grossly nonfocal    Psych: Oriented x 3. No anxiety or agitation.          Medications:  buPROPion, 100 mg, Daily  carvedilol, 12.5 mg, BID  escitalopram, 20 mg, Daily  finasteride, 5 mg, Daily  gabapentin, 200 mg, TID  latanoprost, 1 drop, Nightly  ketotifen, 1 drop, BID  lisinopril, 10 mg, Daily  pantoprazole, 40 mg, QAM AC  rosuvastatin, 20 mg, QPM  tamsulosin, 0.4 mg, Nightly  sodium chloride flush, 5-40 mL, 2 times per day  enoxaparin, 40 mg, Daily  acetaminophen, 1,000 mg, Q6H      PRN Medications:  ipratropium, 2 spray, Q6H PRN  sodium chloride flush, 5-40 mL, PRN  sodium chloride, , PRN  ondansetron, 4 mg, Q8H PRN   Or  ondansetron, 4 mg, Q6H PRN  polyethylene
Pt came back from surgery and the surgeon discontinue all medications including pain meds. Message sent to  who stated I could resume the tramadol.
Pt forgetful he has had the external cath apart. Picking at iv site, dressing, taking off gown, and trying to take off sling. Pt can tell staff the month & year but can't tell you where he is. Nurse reviewed the equipment multiple times, surgery, where he is, time of day. Pt then states is my telephone ringing a lot at home. Where my wife?
Pt left arm warm, swollen, painful, red at forearm. Able to move fingers. Message sent to ortho. Therapy repositioned arm in the sling, ice bags place on arm. Ortho to advise what they think if swelling is normal. SCDs are on both legs. There is no lovenox ordered.  Will get pt in chair for lunch
Pt naked. Lying crosswise in bed. Confused to place & time. Pt's bed check still on. Pt took off his external cath and urine all over. Pt c/o that he had to be moved to clean up the urine. Tele sitter added to his room. A sleeve put over IV site. A new external cath replaced. New gown and linen replaced.
Pt returned from shoulder surgery. PCA asked this writer to check pt because oxygen saturation was 86%. Placed oxygen on at 2 liters with saturation of 92%.
Pt sitting up in bed respirations easy and unlabored. No s/s of distress. Alert and oriented. Denies pain or SOB. SCD's on and in place, pt tolerating well. HGB 7.6 BP stable. Pt stable on RA. No s/s of active bleeding on R arm or at incision sites. Pt's R arm is swollen MD and Ortho are aware. Ortho stated this is an expected finding no new orders for that. Pt denies any further needs at this time. Bed in low position call light within reach.
RT Inhaler-Nebulizer Bronchodilator Protocol Note    There is a bronchodilator order in the chart from a provider indicating to follow the RT Bronchodilator Protocol and there is an Initiate RT Inhaler-Nebulizer Bronchodilator Protocol order as well (see protocol at bottom of note). CXR Findings:  No results found. The findings from the last RT Protocol Assessment were as follows:   History Pulmonary Disease: Smoker 15 pack years or more  Respiratory Pattern: Regular pattern and RR 12-20 bpm  Breath Sounds: Intermittent or unilateral wheezes  Cough: Strong, spontaneous, non-productive  Indication for Bronchodilator Therapy: Wheezing associated with pulm disorder  Bronchodilator Assessment Score: 5    Aerosolized bronchodilator medication orders have been revised according to the RT Inhaler-Nebulizer Bronchodilator Protocol below. Respiratory Therapist to perform RT Therapy Protocol Assessment initially then follow the protocol. Repeat RT Therapy Protocol Assessment PRN for score 0-3 or on second treatment, BID, and PRN for scores above 3. No Indications - adjust the frequency to every 6 hours PRN wheezing or bronchospasm, if no treatments needed after 48 hours then discontinue using Per Protocol order mode. If indication present, adjust the RT bronchodilator orders based on the Bronchodilator Assessment Score as indicated below. Use Inhaler orders unless patient has one or more of the following: on home nebulizer, not able to hold breath for 10 seconds, is not alert and oriented, cannot activate and use MDI correctly, or respiratory rate 25 breaths per minute or more, then use the equivalent nebulizer order(s) with same Frequency and PRN reasons based on the score. If a patient is on this medication at home then do not decrease Frequency below that used at home.     0-3 - enter or revise RT bronchodilator order(s) to equivalent RT Bronchodilator order with Frequency of every 4 hours PRN for
Report called to  [facility] RN
Spoke with MD on phone, new order for pt to receive 1 unit of PRBC. MD also notified of the pin hole area to pts right forearm that has been leaking some blood tinged fluid. MD advised to notify ortho to make them aware.
Transferred care to [facility]. Face to face bedside report given, no need voiced at this time.
Transferred care to [facility]. Face to face bedside report given, no need voiced at this time.
Transport here for patient, to room 208
Deep venous thrombosis prophylaxis - ambulate, SCD, Lovenox in hospital  3:  Continue Pain Control  4:  Nonweightbearing right upper extremity  Sling  May come out of sling for hygiene purposes and active elbow hand and wrist motion. Due to the patient's right hand dominance, would allow him to perform light activities of daily living such as eating with the right upper extremity. May advance left upper extremity weightbearing for functional use  Left shoulder range of motion as tolerated. Ancef IV x24 hours postop.     Sherri Williamson
ability to step, takes independent steps, does not use assistive device patient is MOBILITY LEVEL 4.       Mobility Level- 3
breath, chest pain or loss of consciousness. \"      Past Surgical History:       Past Surgical History             Procedure Laterality Date    BUNIONECTOMY        CARDIAC SURGERY         coronary stents x2    EYE SURGERY         Retinal reattachment - bilateral    HUMERUS FRACTURE SURGERY Left 1/19/2023     INTRAMEDULLARY NAILING OF LEFT PROXIMAL HUMERUS FRACTURE      MCKEON & NEPHEW performed by Artur Rojas MD at [facility]         Bilateral knees    KNEE SURGERY              CT CERVICAL SPINE WO CONTRAST   Final Result   No acute abnormality of the cervical spine. Moderate multilevel degenerative   disc disease. No acute intracranial abnormality. Significant age-appropriate atrophy and   small-vessel disease ischemic changes redemonstrated. XR CHEST PORTABLE   Final Result   1. Chest x-ray demonstrates cardiomegaly and COPD with superimposed right   lung infiltrate. 2. Right upper humeral fracture. The left humeral reconstruction is stable. 3. No evidence of acute hip or other pelvic fractures. CT HEAD WO CONTRAST   Final Result   No acute abnormality of the cervical spine. Moderate multilevel degenerative   disc disease. No acute intracranial abnormality. Significant age-appropriate atrophy and   small-vessel disease ischemic changes redemonstrated. XR SHOULDER RIGHT (MIN 2 VIEWS)   Final Result   Displaced right proximal humerus fracture           XR HIP BILATERAL W AP PELVIS (2 VIEWS)   Final Result   1. Chest x-ray demonstrates cardiomegaly and COPD with superimposed right   lung infiltrate. 2. Right upper humeral fracture. The left humeral reconstruction is stable. 3. No evidence of acute hip or other pelvic fractures. Per Ortho:  Postoperative plan: Nonweightbearing right upper extremity  Sling  May come out of sling for hygiene purposes and active elbow hand and wrist motion.   Due to the patient's right hand dominance, would
is on this medication at home then do not decrease Frequency below that used at home. 0-3 - enter or revise RT bronchodilator order(s) to equivalent RT Bronchodilator order with Frequency of every 4 hours PRN for wheezing or increased work of breathing using Per Protocol order mode. 4-6 - enter or revise RT Bronchodilator order(s) to two equivalent RT bronchodilator orders with one order with BID Frequency and one order with Frequency of every 4 hours PRN wheezing or increased work of breathing using Per Protocol order mode. 7-10 - enter or revise RT Bronchodilator order(s) to two equivalent RT bronchodilator orders with one order with TID Frequency and one order with Frequency of every 4 hours PRN wheezing or increased work of breathing using Per Protocol order mode. 11-13 - enter or revise RT Bronchodilator order(s) to one equivalent RT bronchodilator order with QID Frequency and an Albuterol order with Frequency of every 4 hours PRN wheezing or increased work of breathing using Per Protocol order mode. Greater than 13 - enter or revise RT Bronchodilator order(s) to one equivalent RT bronchodilator order with every 4 hours Frequency and an Albuterol order with Frequency of every 2 hours PRN wheezing or increased work of breathing using Per Protocol order mode.        Electronically signed by Nakita Plunkett RCP on 3/21/2023 at 11:05 PM
weightbearing for functional use  Left shoulder range of motion as tolerated. Ancef IV x24 hours postop. DVT prophylaxis: Ambulation, SCDs  Suspicion for upper extremity DVT extremely low given lack of more distal upper extremity swelling. This is focal to the shoulder consistent with localized hematoma formation. We will keep 1 additional day for observation. Plan to reevaluate tomorrow morning. Anticipate discharge tomorrow. Follow-up in 2 weeks for repeat x-rays, wound check.     Adal Witt MD
with environment and equipment. 22. Patient/Caregiver verbalizes understanding of Phase I and Phase II process. 23.  Patient pain level is established preoperatively using age appropriate pain scale. 24.  The patient will move to fall risk upon sedation- during and through the recovery phase. Interventions- orient the patient to the environment, especially the location of the bathroom; provide treaded socks/non-skid footwear; demonstrate and teach back use of the nurse's call system; instruct the patient to call for help before getting out of bed; lock all movable equipment before transferring patient; keep bed in lowest position possible.  25.  Other:
would allow him to perform light activities of daily living such as eating with the right upper extremity. Aggressive use of ice for swelling/edema reduction. May advance left upper extremity weightbearing for functional use  Left shoulder range of motion as tolerated. Ancef IV x24 hours postop completed. DVT prophylaxis: Ambulation, SCDs  Suspicion for upper extremity DVT extremely low given lack of more distal upper extremity swelling. This is focal to the shoulder consistent with localized hematoma formation. Acute on chronic anemia: Transfuse 1 unit PRBC. Recheck tomorrow. Clinically overall improving. No concern at surgical sites. Follow-up in 2 weeks for repeat x-rays, wound check.      Catherine Swain MD
allow him to perform light activities of daily living such as eating with the right upper extremity. May advance left upper extremity weightbearing for functional use  Left shoulder range of motion as tolerated. Ancef IV x24 hours postop. DVT prophylaxis: Ambulation, SCDs  Anticipate discharge in 2 to 3 days pending progress with therapy, medical stability. Anticipate need for placement. Follow-up in 2 weeks for repeat x-rays, wound check. Electronically signed by Florencio Acevedo MD on 3/23/2023 at 4:37 PM  Home Health S4 Level Recommendation:  NA    AM-PAC Mobility Score    AM-PAC Inpatient Mobility Raw Score : 11       Subjective  Patient lying reclined bed with no family present. Pt agreeable to this PT session. Cognition    A&O x4   Able to follow 2 step commands    Pain   Yes  Location: RUE  Rating: 10 /10  Pain Medicine Status: RN notified    Preadmission Environment:   Pt. Lives with    Spouse  Home environment:    two story home  Steps to enter first floor:   2+2 ,with grab bars  steps to enter  Steps to second floor/basement: Stair chair to second floor and basement  Laundry:     Basement  Bathroom:     walk in shower, hand held shower head, grab bars in shower, shower chair , raised toilet seat w/ arms, and raised toilet seat w/o arms  Pt sleeps in a:    Adjustable bed  Equipment owned:    RW, rollator, SPC, quad cane, manual WC, shower chair/bench, raised toilet seat, lift chair, and reacher    Preadmission Status:  Pt. Able to drive: No  Pt. Fully independent with ADLs: No  Pt. Required assistance from family for: Cleaning, Cooking, Laundry , grocery shopping, and IADLS (med management, paying bills, etc)  Pt. independent for functional transfers and utilized Rollator for mobility in home and Rollator out in community  History of falls: Yes  Home Health Services:PT and OT    Objective  Does this pt have an acute or acute on chronic diagnosis of CHF?  No    Upper Extremity ROM/Strength  Please
pleural disease. 2. Lung fibrosis worse in the right upper and right lower lobes with elements   of honeycombing. Traction bronchiectatic changes. Underlying emphysema as   well. No active pleural disease. 3. Mild mediastinal lymphadenopathy. 4. Large hiatal hernia more pronounced compared to the previous evaluation. XR SHOULDER LEFT (MIN 2 VIEWS)   Final Result   Healing left humeral neck fracture without acute abnormality         CT CERVICAL SPINE WO CONTRAST   Final Result   No acute abnormality of the cervical spine. Moderate multilevel degenerative   disc disease. No acute intracranial abnormality. Significant age-appropriate atrophy and   small-vessel disease ischemic changes redemonstrated. XR CHEST PORTABLE   Final Result   1. Chest x-ray demonstrates cardiomegaly and COPD with superimposed right   lung infiltrate. 2. Right upper humeral fracture. The left humeral reconstruction is stable. 3. No evidence of acute hip or other pelvic fractures. CT HEAD WO CONTRAST   Final Result   No acute abnormality of the cervical spine. Moderate multilevel degenerative   disc disease. No acute intracranial abnormality. Significant age-appropriate atrophy and   small-vessel disease ischemic changes redemonstrated. XR SHOULDER RIGHT (MIN 2 VIEWS)   Final Result   Displaced right proximal humerus fracture         XR HIP BILATERAL W AP PELVIS (2 VIEWS)   Final Result   1. Chest x-ray demonstrates cardiomegaly and COPD with superimposed right   lung infiltrate. 2. Right upper humeral fracture. The left humeral reconstruction is stable. 3. No evidence of acute hip or other pelvic fractures.                Assessment/Plan:    #Mechanical fall   #Right displaced humerus fracture   -Orthopedic surgery consulted   -fall precautions   -Right arm placed in sling  -tramadol and tylenol prn for pain control   -ortho consulted, s/p IM nailing 3/23, post op D#4   -PT OT
pronounced compared to the previous evaluation. XR SHOULDER LEFT (MIN 2 VIEWS)   Final Result   Healing left humeral neck fracture without acute abnormality         CT CERVICAL SPINE WO CONTRAST   Final Result   No acute abnormality of the cervical spine. Moderate multilevel degenerative   disc disease. No acute intracranial abnormality. Significant age-appropriate atrophy and   small-vessel disease ischemic changes redemonstrated. XR CHEST PORTABLE   Final Result   1. Chest x-ray demonstrates cardiomegaly and COPD with superimposed right   lung infiltrate. 2. Right upper humeral fracture. The left humeral reconstruction is stable. 3. No evidence of acute hip or other pelvic fractures. CT HEAD WO CONTRAST   Final Result   No acute abnormality of the cervical spine. Moderate multilevel degenerative   disc disease. No acute intracranial abnormality. Significant age-appropriate atrophy and   small-vessel disease ischemic changes redemonstrated. XR SHOULDER RIGHT (MIN 2 VIEWS)   Final Result   Displaced right proximal humerus fracture         XR HIP BILATERAL W AP PELVIS (2 VIEWS)   Final Result   1. Chest x-ray demonstrates cardiomegaly and COPD with superimposed right   lung infiltrate. 2. Right upper humeral fracture. The left humeral reconstruction is stable. 3. No evidence of acute hip or other pelvic fractures. Assessment/Plan:    #Mechanical fall   #Right displaced humerus fracture   -Orthopedic surgery consulted   -fall precautions   -Right arm placed in sling  -tramadol and tylenol prn for pain control   -ortho consulted, s/p IM nailing 3/23, post op D#3   -PT OT initiated, will need SNF placement  -Increased right shoulder edema, monitor closely. Patient had low-grade fever once yesterday. Placed on empiric IV antibiotics Ancef. No fevers today  -Seen by orthopedic surgeon today.   Continue to monitor    #Hypoxia   #Possible lung fibrosis
-Hgb baseline appears to be around 8~  -monitor CBC daily   -iron studies, J15, folic acid WNLs   -Hgb today is 7.8     #Elevated troponin   #hx of CAD   -0.02-->0. 03- 0.04   -s/p stents   -had recent admission with much higher troponin, is actually down from previous   -EKG with no acute ischemic changes   -last echo as above from 2023  -has reportedly had stress test at the [place] within the last year that was normal   -on statin, BB, ACEI     #CKD Stage III   -Cr appears to be ~baseline   -needs to establish with nephrologist at [place]       #Hyponatremia   -monitor   -has improved      #Previous left humerus reconstruction   -stable      #Normal pressure hydrocephalus   -follows with neurosurgery and ENT       #HTN  -on coreg and lisinopril      #HLD   -on statin      #Hx of prostate cancer      #Hx of alcohol abuse   -in remission, 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
Tone  NT    Balance:  Sitting:    Supervision- initially felt dizzy after change in position. Standing: Mod A  and 2 person with Left HHA. Bed Mobility:   Supine to Sit:    Max A  and 2 person  Sit to Supine:   Not Tested  Rolling:   Not Tested  Scooting in sitting: Max A   Scooting in supine:  Not Tested    Transfers:    Sit to stand: Mod A , 2 person, and Hand Held Assist and gait belt  Stand to sit:    Mod A , 2 person, and Hand Held Assist and gait belt  Bed to chair:     Mod A , 2 person, and Hand Held Assist and gait belt  Bed/ chair to standard toilet:  Not Tested  Bed/chair to [facility]:   Not Tested  Functional Mobility:   Not Tested    See PT note for gait analysis. ADLs:  Dressing:      UE:   Not Tested  LE:    Max A  to don socks    Bathing:    UE:  Not Tested  LE:  Not Tested    Eating:   SBA and With setup provided. Therapist cut pts fish. Pt then consistently demonstrated ability to bring food to mouth. Toileting:  Not Tested    Grooming/hygiene: Not Tested    Activity Tolerance:   Pt completed therapy session with no adverse symptoms     BP (mmHg) HR (bpm) SpO2 (%) on 1L Comments   Supine at rest 128/69 88 95    Seated at EOB       Standing       End of session   93      Positioning Needs:   Pt up in chair, alarm set, call light provided and all needs within reach . Ther Ex / Activities Initiated:   N/A    Patient/Family Education:   Pt educated on role of inpatient OT, plan of care, importance of continued activity, DC recommendations, safety awareness, transfer techniques, and calling for assist with mobility and WBing precautions. Assessment:    Pt seen for Occupational therapy evaluation in acute care setting. Pt demonstrated decreased Activity tolerance, ADLs, IADLs, Balance , Bathing, Bed mobility, Dressing, Safety Awareness, and Transfers.  Pt functioning below baseline and will likely benefit from skilled occupational therapy services to maximize safety and
inhalers   -stable on RA   -no s/s of infection or acute exacerbation     #Acute on chronic Macrocytic Anemia   -hs required blood transfusions in the past   -Hgb baseline appears to be around 8~  -monitor CBC daily   -iron studies, P40, folic acid pending  -denies any s/s of bleeding     #Elevated troponin   #hx of CAD   -0.02-->0. 03- 0.04   -s/p stents   -had recent admission with much higher troponin, is actually down from previous   -EKG with no acute ischemic changes   -last echo as above from 2023  -has reportedly had stress test at the [place] within the last year that was normal   -on statin, BB, ACEI    #CKD Stage III   -Cr appears to be ~baseline   -needs to establish with nephrologist at [place]      #Hyponatremia   -monitor     #Previous left humerus reconstruction   -stable     #Normal pressure hydrocephalus   -follows with neurosurgery and ENT      #HTN  -on coreg and lisinopril     #HLD   -on statin     #Hx of prostate cancer     #Hx of alcohol abuse   -in remission, 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 PT OT will evaluate patient after procedure.   Discussed with case management pre-CERT will be started after PT OT eval for SNF  placement      Donal Tay MD  03/23/23  12:46 PM
right shoulder edema, monitor closely.  Patient had low-grade fever once on 3/25.  Placed on empiric IV antibiotics Ancef.  No fevers today  -Seen by orthopedic surgeon today.  Continue to monitor    #Acute blood loss anemia  #Acute on chronic Macrocytic Anemia   Likely exacerbated by post op bleeding   -has required blood transfusions in the past   -Hgb baseline appears to be around 8~  -monitor CBC daily   -iron studies, B12, folic acid WNLs   -ABLA due to bruising and ecchymosis  Monitor H/H .  Hemoglobin down to 6.7 today-> 1 unit of PRBC transfused.  Continue to monitor H&H closely.  S/p 1 unit of PRBC transfusion yesterday, hemoglobin improved to 7.6 today      #Hypoxia   #Possible lung fibrosis on CT scan   #COPD/Asthma without AE   -[facility]   -continue prn inhalers   -Required 1 L -> stable on RA  now  -no s/s of infection or acute exacerbation      #Elevated troponin   #hx of CAD   -0.02-->0.03- 0.04   -s/p stents   -had recent admission with much higher troponin, is actually down from previous   -EKG with no acute ischemic changes   -last echo as above from 2023  -[facility] within the [facility] that was normal   -on statin, BB, ACEI     #CKD Stage III   -Cr appears to be ~baseline   -needs to [facility]       #Hyponatremia   -monitor   -has improved      #Previous left humerus reconstruction   -stable      #Normal pressure hydrocephalus   -follows with neurosurgery and ENT       #HTN  -on coreg and lisinopril      #HLD   -on statin      #Hx of prostate cancer      #Hx of alcohol abuse   -in remission, 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   Monitor for any recurrent exudate from right forearm.   Monitor for any recurrent fevers.

## 2023-03-28 NOTE — DISCHARGE INSTR - COC
Therapy:   - Oral Diet:  General    Routes of Feeding: Oral  Liquids: Thin Liquids  Daily Fluid Restriction: no  Last Modified Barium Swallow with Video (Video Swallowing Test): not done    Treatments at the Time of Hospital Discharge:   Respiratory Treatments:   Oxygen Therapy:  is not on home oxygen therapy. Ventilator:    - No ventilator support    Rehab Therapies: Physical Therapy and Occupational Therapy  Weight Bearing Status/Restrictions: No weight bearing restrictions  Other Medical Equipment (for information only, NOT a DME order): Other Treatments:    Patient's personal belongings (please select all that are sent with patient):  None    RN SIGNATURE:  Electronically signed by Maggi Hoang RN on 3/28/23 at 4:51 PM EDT    CASE MANAGEMENT/SOCIAL WORK SECTION    Inpatient Status Date: 3/21/23    Readmission Risk Assessment Score:  Readmission Risk              Risk of Unplanned Readmission:  21           Discharging to Facility/ Agency   Name: Name: [facility]  Address: [address] [place] [address],[address] [place], [place] [address] [facility]erkowitz [facility] [address]  Phone: [phone]  Fax: [phone]   Address:  Phone:  Fax:    Dialysis Facility (if applicable)   Name:  Address:  Dialysis Schedule:  Phone:  Fax:    / signature: Electronically signed by Priscilla Gomez RN on 3/28/23 at 1:26 PM EDT    PHYSICIAN SECTION    Prognosis: Good    Condition at Discharge: Stable    Rehab Potential (if transferring to Rehab): Good    Recommended Labs or Other Treatments After Discharge: CBC on 3/30 to check Hgb. Goal to maintain above 7.0    Physician Certification: I certify the above information and transfer of Rose Douglas  is necessary for the continuing treatment of the diagnosis listed and that he requires [facility] for less 30 days.      Update Admission H&P: No change in H&P    PHYSICIAN SIGNATURE:  Electronically signed by Sol Reynoso MD on 3/28/23 at 12:14 PM EDT

## 2023-03-28 NOTE — CARE COORDINATION
[address] at [phone] and spoke with Tiffany to provide an update that pt will likely discharge tomorrow pending MD. Petra Lora states pt can admit tomorrow 3/28/2023 and there is no issues.      Mil GASCA, SUJEYS  Case Management Department  Phone: [phone] Fax: [phone]
DISCHARGE ORDER  Date/Time 3/28/2023 1:52 PM  Completed by: Raphael Ellis, RN, Case Management    Patient Name: Yoseph Londono    : 1940      Admit order Date and Status:INPT 3/21/23  Noted discharge order. (verify MD's last order for status of admission/Traditional Medicare 3 [place] Inpatient qualifying stay required for SNF)    Confirmed discharge plan with:              Patient:  Yes              When pt confirms DC plan does any support person need to be contacted by CM Yes if yes who_pt spouse_____                      Discharge to Facility: [facility] phone number for staff giving report: [phone]   Pre-certification completed: yes   Hospital Exemption Notification (HENS) completed: yes   Discharge orders and Continuity of Care faxed to facility:  facility to obtain from [facility]      Transportation:               Medical Transport explained with choice list offered to pt/family. Choice:(no preference)  Agency used: [employer]   time:   [id number]      Pt/family/Nursing/Facility aware of  time:   Yes Names: pt, wife, , nurse, facility  Ambulance form completed:  yes:      Date Last IMM Given: 3/28/23    Comments: Facility will swab for covid-19 when pt arrives. Pt is being d/c'd to [facility] today. Pt's O2 sats are 95% on roomair. Discharge timeout done with Nola RN. All discharge needs and concerns addressed. Discharging nurse to complete SRIKANTH, reconcile AVS, and place final copy with patient's discharge packet. Discharging RN to ensure that written prescriptions for  Level II medications are sent with patient to the facility as per protocol.
Revd chart, plan remains that pt will dc to [facility] when medically stable for SNF and precert approved. Marita Sascha ID: [id number] with dates of 03/25/2023-03/28/2023.   Electronically signed by ELO Garcia on 3/27/2023 at 1:32 PM
Reviewed [facility] portal and precert for [facility] is approved. Brad Foy ID: [id number] with dates of 03/25/2023-03/28/2023. MD updated.      Jose Raul GASCA, SHAI-S  Case Management Department  Phone: [phone] Fax: [phone]
Spoke with PT who is aware precert is approved for the [address] [facility] MARIA E GASCA  Case Management Department  Phone: [phone] Fax: [phone]
[facility] but had not yet started. Noted pt to go to surgery in AM. Will follow. Received call back from Bibi Sahu at [facility] states can accept at [place] if STR needed. The Plan for Transition of Care is related to the following treatment goals of History of humerus fracture [Z87.81]  Closed fracture of proximal end of right humerus, unspecified fracture morphology, initial encounter [Y15.243R]    IF APPLICABLE: The Patient and/or patient representative Carmelo Bhatti and his family were provided with a choice of provider and agrees with the discharge plan. Freedom of choice list with basic dialogue that supports the patient's individualized plan of care/goals and shares the quality data associated with the providers was provided to:     Patient Representative Name:       The Patient and/or Patient Representative Agree with the Discharge Plan?       Logan Naidu RN  Case Management Department  Ph: [phone] Fax: [phone]

## 2023-03-28 NOTE — DISCHARGE INSTRUCTIONS
Nonweightbearing right upper extremity  Sling at all times for support/protection  May come out of sling for hygiene purposes and active elbow hand and wrist motion. Due to the patient's right hand dominance, would allow him to perform light activities of daily living such as eating with the right upper extremity. Aggressive use of ice for swelling/edema reduction. May advance left upper extremity weightbearing for functional use  Left shoulder range of motion as tolerated. DVT prophylaxis: Ambulation, SCDs    Keep dressing clean and dry. Change Mepilex every 3-5 days or as needed for excess drainage. Please call physician if increased redness around incision, increased drainage, fevers, or pain becomes significantly worse. Do not immerse in water such as baths but okay to shower with dressing on to protect wound. F/U with Dr Genet Goyal in 10-14 days. Call [facility] for appointment time and date for follow up at [phone]. Pain Medications  You were given ultram  Wean off pain medications as you deem appropriate as long as pain is under control  Be sure to drink plenty of fluids (recommend water) while taking narcotic pain medications to prevent constipation   You may take an over the counter laxative or stool softener as needed to prevent/treat constipation as well, we recommend Senokot S OTC. We recommend that you consider taking these medications the entire time you are taking pain medication. Cold packs/Ice packs/Machine  May be used as much as necessary to control swelling/inflammation/soreness  Be sure to have a barrier (cloth, clothing, towel) between the site and the ice pack to prevent [id number] office [phone] if  Increased redness, swelling, drainage of any kind, and/or pain to surgery site. As well as new onset fevers and or chills. These could signify an infection.   Calf or thigh tenderness to touch as well as increased swelling

## 2023-03-28 NOTE — PLAN OF CARE
ORTHO NOTE    Xrays reviewed. In summary, 80yo male well known to me with severe glenohumeral arthritis presenting with a right  comminuted displaced proximal humeral shaft fracture after mechanical fall. Likely to recommend treatment in the form of intramedullary nail fixation. Tentative timing for Thurs pending OR availability. To discuss with patient and family tomorrow.     Kelly Moss MD
Pre-Operative:  1. Patient/Caregiver identifies - states name and date of birth. 2.  The patient is free from signs and symptoms of injury. 3.  The patient receives appropriate medication(s), safely administered during the Perioperative period. 4.  The patient is free from signs and symptoms of infection. 5.  The patient has wound / tissue perfusion. 6.  The patients's fluid, electrolyte, and acid-base balances are established preoperatively. 7.  The patient's pulmonary function is established preoperatively. 8.  The patient's cardiovascular status is established preoperatively. 9.  The patient / caregiver demonstrates knowledge of nutritional management related to the operative or other invasive procedure. 10. The patient/caregiver demonstrates knowledge of medication management. 11. The patient/caregiver demonstrates knowledge of pain management. 12.  The patient participates in the rehabilitation process as applicable. 13.  The patient/caregiver participates in decisions affection his or her Perioperative plan of care. 14.  The patient's care is consistent with the individualized Perioperative plan of care. 15.  The patient's right to privacy is maintained. 16.  The patient is the recipient of competent and ethical care within legal standards of practice. 17.  The patient's value system, lifestyle, ethnicity, and culture are considered, respected, and incorporated in the Perioperative plan of care and understands special services available. 18.  The patient demonstrates and/or reports adequate pain control throughout the the Perioperative period. 19. The patient's neurological status is established preoperatively. 20. The patient/caregiver demonstrates knowledge of the expected responses to the operative or invasive procedure. 21.  Patient/Caregiver has reduced anxiety. Interventions- Familiarize with environment and equipment.   22. Patient/Caregiver verbalizes understanding of Phase I
Problem: Discharge Planning  Goal: Discharge to home or other facility with appropriate resources  3/23/2023 0029 by Lani Sanchez RN  Outcome: Progressing     Problem: Pain  Goal: Verbalizes/displays adequate comfort level or baseline comfort level  3/23/2023 0029 by Lani Sanchez RN  Outcome: Progressing     Problem: Safety - Adult  Goal: Free from fall injury  3/23/2023 0029 by Lani Sanchez RN  Outcome: Progressing     Problem: ABCDS Injury Assessment  Goal: Absence of physical injury  3/23/2023 0029 by Lani Sanchez RN  Outcome: Progressing     Problem: Respiratory - Adult  Goal: Achieves optimal ventilation and oxygenation  3/23/2023 0029 by Lani Sanchez RN  Outcome: Progressing     Problem: Cardiovascular - Adult  Goal: Maintains optimal cardiac output and hemodynamic stability  3/23/2023 0029 by Lani Sanchez RN  Outcome: Progressing     Problem: Cardiovascular - Adult  Goal: Absence of cardiac dysrhythmias or at baseline  3/23/2023 0029 by Lani Sanchez RN  Outcome: Progressing     Problem: Skin/Tissue Integrity - Adult  Goal: Skin integrity remains intact  3/23/2023 0029 by Lani Sanchez RN  Outcome: Progressing     Problem: Musculoskeletal - Adult  Goal: Return mobility to safest level of function  3/23/2023 0029 by Lani Sanchez RN  Outcome: Progressing     Problem: Musculoskeletal - Adult  Goal: Maintain proper alignment of affected body part  3/23/2023 0029 by Lani Sanchez RN  Outcome: Progressing     Problem: Musculoskeletal - Adult  Goal: Return ADL status to a safe level of function  3/23/2023 0029 by Lani Sanchez RN  Outcome: Progressing     Problem: Genitourinary - Adult  Goal: Absence of urinary retention  3/23/2023 0029 by Lani Sanchez RN  Outcome: Progressing
Problem: Discharge Planning  Goal: Discharge to home or other facility with appropriate resources  3/23/2023 2333 by Haile Long RN  Outcome: Progressing  3/23/2023 1340 by Katie Lowe RN  Outcome: Progressing     Problem: Pain  Goal: Verbalizes/displays adequate comfort level or baseline comfort level  3/23/2023 2333 by Haile Long RN  Outcome: Progressing  3/23/2023 1340 by Katie Lowe RN  Outcome: Progressing     Problem: Safety - Adult  Goal: Free from fall injury  3/23/2023 2333 by Haile Long RN  Outcome: Progressing  3/23/2023 1340 by Katie Lowe RN  Outcome: Progressing     Problem: ABCDS Injury Assessment  Goal: Absence of physical injury  3/23/2023 2333 by Haile Long RN  Outcome: Progressing  3/23/2023 1340 by Katie Lowe RN  Outcome: Progressing     Problem: Respiratory - Adult  Goal: Achieves optimal ventilation and oxygenation  3/23/2023 2333 by Haile Long RN  Outcome: Progressing  3/23/2023 1340 by Katie Lowe RN  Outcome: Progressing     Problem: Cardiovascular - Adult  Goal: Maintains optimal cardiac output and hemodynamic stability  3/23/2023 2333 by Haile Long RN  Outcome: Progressing  3/23/2023 1340 by Katie Lowe RN  Outcome: Progressing  Goal: Absence of cardiac dysrhythmias or at baseline  3/23/2023 2333 by Haile Long RN  Outcome: Progressing  3/23/2023 1340 by Katie Lowe RN  Outcome: Progressing     Problem: Skin/Tissue Integrity - Adult  Goal: Skin integrity remains intact  3/23/2023 2333 by Haile Long RN  Outcome: Progressing  3/23/2023 1340 by Katie Lowe RN  Outcome: Progressing     Problem: Musculoskeletal - Adult  Goal: Return mobility to safest level of function  3/23/2023 2333 by Haile Long RN  Outcome: Progressing  3/23/2023 1340 by Katie Lowe RN  Outcome: Progressing  Goal: Maintain proper alignment of affected body part  3/23/2023 2333 by Electa Boxer
Problem: Discharge Planning  Goal: Discharge to home or other facility with appropriate resources  3/24/2023 0017 by Aicha Cristina RN  Outcome: Progressing  3/23/2023 2333 by Aicha Cristina RN  Outcome: Progressing  3/23/2023 1340 by Jair Gray RN  Outcome: Progressing     Problem: Pain  Goal: Verbalizes/displays adequate comfort level or baseline comfort level  3/24/2023 0017 by Aicha Cristina RN  Outcome: Progressing  3/23/2023 2333 by Aicha Cristina RN  Outcome: Progressing  3/23/2023 1340 by Jair Gray RN  Outcome: Progressing     Problem: Safety - Adult  Goal: Free from fall injury  3/24/2023 0017 by Aicha Cristina RN  Outcome: Progressing  3/23/2023 2333 by Aicha Cristina RN  Outcome: Progressing  3/23/2023 1340 by Jair Gray RN  Outcome: Progressing     Problem: ABCDS Injury Assessment  Goal: Absence of physical injury  3/24/2023 0017 by Aicha Cristina RN  Outcome: Progressing  3/23/2023 2333 by Aicha Cristina RN  Outcome: Progressing  3/23/2023 1340 by Jair Gray RN  Outcome: Progressing     Problem: Genitourinary - Adult  Goal: Absence of urinary retention  3/24/2023 0017 by Aicha Cristina RN  Outcome: Progressing  3/23/2023 2333 by Aicha Cristina RN  Outcome: Progressing  3/23/2023 1340 by Jair Gray RN  Outcome: Progressing
Problem: Discharge Planning  Goal: Discharge to home or other facility with appropriate resources  3/24/2023 2243 by Fariha Lincoln RN  Outcome: Progressing     Problem: Pain  Goal: Verbalizes/displays adequate comfort level or baseline comfort level  3/24/2023 2243 by Fariha Lincoln RN  Outcome: Progressing     Problem: Safety - Adult  Goal: Free from fall injury  3/24/2023 2243 by Fariha Lincoln RN  Outcome: Progressing     Problem: ABCDS Injury Assessment  Goal: Absence of physical injury  3/24/2023 2243 by Fariha Lincoln RN  Outcome: Progressing     Problem: Respiratory - Adult  Goal: Achieves optimal ventilation and oxygenation  3/24/2023 2243 by Fariha Lincoln RN  Outcome: Progressing     Problem: Cardiovascular - Adult  Goal: Maintains optimal cardiac output and hemodynamic stability  3/24/2023 2243 by Fariha Lincoln RN  Outcome: Progressing     Problem: Cardiovascular - Adult  Goal: Absence of cardiac dysrhythmias or at baseline  3/24/2023 2243 by Fariha Lincoln RN  Outcome: Progressing     Problem: Skin/Tissue Integrity - Adult  Goal: Skin integrity remains intact  3/24/2023 2243 by Fariha Lincoln RN  Outcome: Progressing     Problem: Musculoskeletal - Adult  Goal: Return mobility to safest level of function  3/24/2023 2243 by Fariha Lincoln RN  Outcome: Progressing     Problem: Musculoskeletal - Adult  Goal: Maintain proper alignment of affected body part  3/24/2023 2243 by Fariha Lincoln RN  Outcome: Progressing     Problem: Musculoskeletal - Adult  Goal: Return ADL status to a safe level of function  3/24/2023 2243 by Fariha Lincoln RN  Outcome: Progressing     Problem: Genitourinary - Adult  Goal: Absence of urinary retention  3/24/2023 2243 by Fariha Lincoln RN  Outcome: Progressing
Problem: Discharge Planning  Goal: Discharge to home or other facility with appropriate resources  3/26/2023 0047 by Ervin Mathur RN  Outcome: Progressing  3/25/2023 1631 by Tae Manrique RN  Outcome: Progressing     Problem: Pain  Goal: Verbalizes/displays adequate comfort level or baseline comfort level  3/26/2023 0047 by Ervin Mathur RN  Outcome: Progressing  3/25/2023 1631 by Tae Manrique RN  Outcome: Progressing     Problem: Safety - Adult  Goal: Free from fall injury  3/26/2023 0047 by Ervin Mathur RN  Outcome: Progressing  3/25/2023 1631 by Tae Manrique RN  Outcome: Progressing     Problem: ABCDS Injury Assessment  Goal: Absence of physical injury  3/26/2023 0047 by Ervin Mathur RN  Outcome: Progressing  3/25/2023 1631 by Tae Manrique RN  Outcome: Progressing     Problem: Respiratory - Adult  Goal: Achieves optimal ventilation and oxygenation  3/26/2023 0047 by Ervin aMthur RN  Outcome: Progressing  3/25/2023 1631 by Tae Manrique RN  Outcome: Progressing     Problem: Cardiovascular - Adult  Goal: Maintains optimal cardiac output and hemodynamic stability  3/26/2023 0047 by Ervin Mathur RN  Outcome: Progressing  3/25/2023 1631 by Tae Manrique RN  Outcome: Progressing  Goal: Absence of cardiac dysrhythmias or at baseline  3/26/2023 0047 by Ervin Mathur RN  Outcome: Progressing  3/25/2023 1631 by Tae Manrique RN  Outcome: Progressing     Problem: Skin/Tissue Integrity - Adult  Goal: Skin integrity remains intact  3/26/2023 0047 by Ervin Mathur RN  Outcome: Progressing  3/25/2023 1631 by Tae Manrique RN  Outcome: Progressing     Problem: Musculoskeletal - Adult  Goal: Return mobility to safest level of function  3/26/2023 0047 by Ervin Mathur RN  Outcome: Progressing  3/25/2023 1631 by Tae Manrique RN  Outcome: Progressing  Goal: Maintain proper alignment of affected body part  3/26/2023 0047 by Ervin Mathur RN  Outcome:
Problem: Discharge Planning  Goal: Discharge to home or other facility with appropriate resources  3/26/2023 1149 by Ariel Lazo RN  Outcome: Progressing  3/26/2023 0047 by Lloyd Gonzalez RN  Outcome: Progressing     Problem: Pain  Goal: Verbalizes/displays adequate comfort level or baseline comfort level  3/26/2023 1149 by Ariel Lazo RN  Outcome: Progressing  3/26/2023 0047 by Lloyd Gonzalez RN  Outcome: Progressing     Problem: Safety - Adult  Goal: Free from fall injury  3/26/2023 1149 by Ariel Lazo RN  Outcome: Progressing  3/26/2023 0047 by Lloyd Gonzalez RN  Outcome: Progressing     Problem: ABCDS Injury Assessment  Goal: Absence of physical injury  3/26/2023 1149 by Ariel Lazo RN  Outcome: Progressing  3/26/2023 0047 by Lloyd Gonzalez RN  Outcome: Progressing
Problem: Discharge Planning  Goal: Discharge to home or other facility with appropriate resources  3/27/2023 0021 by Nas Wilkins RN  Outcome: Progressing  3/26/2023 1149 by Asim Oliveira RN  Outcome: Progressing     Problem: Pain  Goal: Verbalizes/displays adequate comfort level or baseline comfort level  3/27/2023 0021 by Nas Wilkins RN  Outcome: Progressing  3/26/2023 1149 by Asim Oliveira RN  Outcome: Progressing     Problem: Safety - Adult  Goal: Free from fall injury  3/27/2023 0021 by Nas Wilkins RN  Outcome: Progressing  3/26/2023 1149 by Asim Oliveira RN  Outcome: Progressing     Problem: ABCDS Injury Assessment  Goal: Absence of physical injury  3/27/2023 0021 by Nas Wilkins RN  Outcome: Progressing  3/26/2023 1149 by Asim Oliveira RN  Outcome: Progressing     Problem: Respiratory - Adult  Goal: Achieves optimal ventilation and oxygenation  3/27/2023 0021 by Nas Wilkins RN  Outcome: Progressing  3/26/2023 1149 by Asim Oliveira RN  Outcome: Progressing     Problem: Cardiovascular - Adult  Goal: Maintains optimal cardiac output and hemodynamic stability  3/27/2023 0021 by Nas Wilkins RN  Outcome: Progressing  3/26/2023 1149 by Asim Oliveira RN  Outcome: Progressing  Goal: Absence of cardiac dysrhythmias or at baseline  3/27/2023 0021 by Nas Wilkins RN  Outcome: Progressing  3/26/2023 1149 by Asim Oliveira RN  Outcome: Progressing     Problem: Skin/Tissue Integrity - Adult  Goal: Skin integrity remains intact  3/27/2023 0021 by Nas Wilkins RN  Outcome: Progressing  3/26/2023 1149 by Asim Oliveira RN  Outcome: Progressing     Problem: Musculoskeletal - Adult  Goal: Return mobility to safest level of function  3/27/2023 0021 by Nas Wilkins RN  Outcome: Progressing  3/26/2023 1149 by Asim Oliveira RN  Outcome: Progressing  Goal: Maintain proper alignment of affected body part  3/27/2023 0021 by Nas Wilkins RN  Outcome: Progressing  3/26/2023 1149 by
Problem: Discharge Planning  Goal: Discharge to home or other facility with appropriate resources  3/27/2023 2336 by Elizabeth Hayes RN  Outcome: Progressing  3/27/2023 1755 by Cyrus Coleman RN  Outcome: Progressing     Problem: Pain  Goal: Verbalizes/displays adequate comfort level or baseline comfort level  3/27/2023 2336 by Elizabeth Hayes RN  Outcome: Progressing  3/27/2023 1755 by Cyrus Coleman RN  Outcome: Progressing     Problem: Safety - Adult  Goal: Free from fall injury  3/27/2023 2336 by Elizabeth Hayes RN  Outcome: Progressing
Problem: Discharge Planning  Goal: Discharge to home or other facility with appropriate resources  3/28/2023 1941 by Sindy Ormond, RN  Outcome: Progressing  3/28/2023 1630 by Sindy Ormond, RN  Outcome: Progressing     Problem: Pain  Goal: Verbalizes/displays adequate comfort level or baseline comfort level  3/28/2023 1941 by Sindy Ormond, RN  Outcome: Progressing  3/28/2023 1630 by Sindy Ormond, RN  Outcome: Progressing     Problem: Safety - Adult  Goal: Free from fall injury  3/28/2023 1941 by Sindy Ormond, RN  Outcome: Progressing  3/28/2023 1630 by Sindy Ormond, RN  Outcome: Progressing     Problem: ABCDS Injury Assessment  Goal: Absence of physical injury  3/28/2023 1941 by Sindy Ormond, RN  Outcome: Progressing  3/28/2023 1630 by Sindy Ormond, RN  Outcome: Progressing     Problem: Respiratory - Adult  Goal: Achieves optimal ventilation and oxygenation  3/28/2023 1941 by Sindy Ormond, RN  Outcome: Progressing  3/28/2023 1630 by Sindy Ormond, RN  Outcome: Progressing     Problem: Cardiovascular - Adult  Goal: Maintains optimal cardiac output and hemodynamic stability  3/28/2023 1941 by Sindy Ormond, RN  Outcome: Progressing  3/28/2023 1630 by Sindy Ormond, RN  Outcome: Progressing  Goal: Absence of cardiac dysrhythmias or at baseline  3/28/2023 1941 by Sindy Ormond, RN  Outcome: Progressing  3/28/2023 1630 by Sindy Ormond, RN  Outcome: Progressing     Problem: Skin/Tissue Integrity - Adult  Goal: Skin integrity remains intact  3/28/2023 1941 by Sindy Ormond, RN  Outcome: Progressing  3/28/2023 1630 by Sindy Ormond, RN  Outcome: Progressing     Problem: Musculoskeletal - Adult  Goal: Return mobility to safest level of function  3/28/2023 1941 by Sindy Ormond, RN  Outcome: Progressing  3/28/2023 1630 by Sindy Ormond, RN  Outcome: Progressing  Goal: Maintain proper alignment of affected body part  3/28/2023 1941 by Sindy Ormond, RN  Outcome: Progressing  3/28/2023 1630 by
Problem: Discharge Planning  Goal: Discharge to home or other facility with appropriate resources  Outcome: Progressing     Problem: Pain  Goal: Verbalizes/displays adequate comfort level or baseline comfort level  Outcome: Progressing
Problem: Discharge Planning  Goal: Discharge to home or other facility with appropriate resources  Outcome: Progressing     Problem: Pain  Goal: Verbalizes/displays adequate comfort level or baseline comfort level  Outcome: Progressing     Problem: Safety - Adult  Goal: Free from fall injury  Outcome: Progressing     Problem: ABCDS Injury Assessment  Goal: Absence of physical injury  Outcome: Progressing     Problem: Respiratory - Adult  Goal: Achieves optimal ventilation and oxygenation  Outcome: Progressing     Problem: Cardiovascular - Adult  Goal: Maintains optimal cardiac output and hemodynamic stability  Outcome: Progressing  Goal: Absence of cardiac dysrhythmias or at baseline  Outcome: Progressing     Problem: Skin/Tissue Integrity - Adult  Goal: Skin integrity remains intact  Outcome: Progressing     Problem: Musculoskeletal - Adult  Goal: Return mobility to safest level of function  Outcome: Progressing  Goal: Maintain proper alignment of affected body part  Outcome: Progressing  Goal: Return ADL status to a safe level of function  Outcome: Progressing     Problem: Genitourinary - Adult  Goal: Absence of urinary retention  Outcome: Progressing
Problem: Discharge Planning  Goal: Discharge to home or other facility with appropriate resources  Outcome: Progressing     Problem: Pain  Goal: Verbalizes/displays adequate comfort level or baseline comfort level  Outcome: Progressing     Problem: Safety - Adult  Goal: Free from fall injury  Outcome: Progressing     Problem: ABCDS Injury Assessment  Goal: Absence of physical injury  Outcome: Progressing     Problem: Respiratory - Adult  Goal: Achieves optimal ventilation and oxygenation  Outcome: Progressing     Problem: Cardiovascular - Adult  Goal: Maintains optimal cardiac output and hemodynamic stability  Outcome: Progressing  Goal: Absence of cardiac dysrhythmias or at baseline  Outcome: Progressing     Problem: Skin/Tissue Integrity - Adult  Goal: Skin integrity remains intact  Outcome: Progressing     Problem: Musculoskeletal - Adult  Goal: Return mobility to safest level of function  Outcome: Progressing  Goal: Maintain proper alignment of affected body part  Outcome: Progressing  Goal: Return ADL status to a safe level of function  Outcome: Progressing     Problem: Genitourinary - Adult  Goal: Absence of urinary retention  Outcome: Progressing     Problem: Skin/Tissue Integrity  Goal: Absence of new skin breakdown  Description: 1. Monitor for areas of redness and/or skin breakdown  2. Assess vascular access sites hourly  3. Every 4-6 hours minimum:  Change oxygen saturation probe site  4. Every 4-6 hours:  If on nasal continuous positive airway pressure, respiratory therapy assess nares and determine need for appliance change or resting period.   Outcome: Progressing     Problem: Nutrition Deficit:  Goal: Optimize nutritional status  Outcome: Progressing
PATTI Fuchs  Outcome: Progressing  Goal: Return ADL status to a safe level of function  3/23/2023 1340 by Mikaela Haddad RN  Outcome: Progressing  3/23/2023 0029 by Zainab Mcclain RN  Outcome: Progressing     Problem: Genitourinary - Adult  Goal: Absence of urinary retention  3/23/2023 1340 by Mikaela Haddad RN  Outcome: Progressing  3/23/2023 0029 by Zainab Mcclain RN  Outcome: Progressing
decreased cardiac output  Goal: Absence of cardiac dysrhythmias or at baseline  Outcome: Progressing  Flowsheets (Taken 3/22/2023 0935)  Absence of cardiac dysrhythmias or at baseline:   Monitor cardiac rate and rhythm   Assess for signs of decreased cardiac output     Problem: Skin/Tissue Integrity - Adult  Goal: Skin integrity remains intact  Outcome: Progressing  Flowsheets (Taken 3/22/2023 0935)  Skin Integrity Remains Intact: Monitor for areas of redness and/or skin breakdown     Problem: Musculoskeletal - Adult  Goal: Return mobility to safest level of function  Outcome: Progressing  Flowsheets (Taken 3/22/2023 0935)  Return Mobility to Safest Level of Function:   Assess patient stability and activity tolerance for standing, transferring and ambulating with or without assistive devices   Assist with transfers and ambulation using safe patient handling equipment as needed   Obtain physical therapy/occupational therapy consults as needed   Instruct patient/family in ordered activity level  Goal: Maintain proper alignment of affected body part  Outcome: Progressing  Flowsheets (Taken 3/22/2023 0935)  Maintain proper alignment of affected body part: Support and protect limb and body alignment per provider's orders  Goal: Return ADL status to a safe level of function  Outcome: Progressing  Flowsheets (Taken 3/22/2023 0935)  Return ADL Status to a Safe Level of Function:   Assess activities of daily living deficits and provide assistive devices as needed   Obtain physical therapy/occupational therapy consults as needed   Assist and instruct patient to increase activity and self care as tolerated     Problem: Genitourinary - Adult  Goal: Absence of urinary retention  Outcome: Progressing  Flowsheets (Taken 3/22/2023 0935)  Absence of urinary retention: Assess patients ability to void and empty bladder
migraine,do not exceed 200mg/24 hours. Ok to repeat in 2 hours if headache continues. Historical Provider, MD   pantoprazole (PROTONIX) 40 MG tablet Take 40 mg by mouth every morning (before breakfast)    Historical Provider, MD   escitalopram (LEXAPRO) 20 MG tablet Take 20 mg by mouth daily    Historical Provider, MD   aspirin 81 MG chewable tablet Take 1 tablet by mouth daily 7/14/19   Keara Braswell MD   carvedilol (COREG) 12.5 MG tablet Take 12.5 mg by mouth 2 times daily     Historical Provider, MD   lisinopril (PRINIVIL;ZESTRIL) 20 MG tablet Take 10 mg by mouth daily    Historical Provider, MD   latanoprost (XALATAN) 0.005 % ophthalmic solution Place 1 drop into both eyes nightly    Historical Provider, MD       Allergies:  Pcn [penicillins], Percocet [oxycodone-acetaminophen], and Vicodin [hydrocodone-acetaminophen]    Social History:    Tobacco:  reports that he has quit smoking. He has never used smokeless tobacco.   Alcohol:  reports that he does not currently use alcohol. Family History:   History reviewed. No pertinent family history. PHYSICAL EXAM:    MUSCULOSKELETAL:  there is no redness, warmth, or swelling of the joints  full range of motion noted  motor strength is 5 out of 5 all extremities bilaterally  tone is normal  with exception of  RIGHT SHOULDER:  redness absent  warmth present  swelling present  tenderness present and noted proximal humerus. No open wound. No obvious deformity. range of motion limited due to pain. Moving hand and wrist.  Sensation intact to touch in the arm. Good radial pulse.     DATA:    Admission weight: 166 lb 14.2 oz (75.7 kg)  5' 9\" (175.3 cm)  VITALS:  /64   Pulse 64   Temp 97.7 °F (36.5 °C) (Oral)   Resp 18   Ht 5' 9\" (1.753 m)   Wt 161 lb 12.8 oz (73.4 kg)   SpO2 98%   BMI 23.89 kg/m²   CBC:   Recent Labs     03/21/23  1555 03/22/23  0608   WBC 6.6 6.3   HGB 10.1* 8.7*    306     BMP:    Recent Labs     03/21/23  1555

## 2023-03-30 NOTE — DISCHARGE SUMMARY
condition to SNF     Follow Up:   Follow up with physician at [facility]      ***

## 2023-04-05 ENCOUNTER — TELEPHONE (OUTPATIENT)
Dept: ORTHOPEDIC SURGERY | Age: 83
End: 2023-04-05

## 2023-04-05 NOTE — TELEPHONE ENCOUNTER
Left voicemail asking for them to schedule Blayne Randall first post op from his R shoulder surgery on 3/23/23.

## 2023-04-06 ENCOUNTER — TELEPHONE (OUTPATIENT)
Dept: CASE MANAGEMENT | Age: 83
End: 2023-04-06

## 2023-04-06 NOTE — TELEPHONE ENCOUNTER
Imaging report CT Chest 3/21/23 with incidental findings of lyphmadenopathy sent to Vianney Hooks MD    [address] [facility] [place] De [email or web address]

## 2023-04-26 ENCOUNTER — OFFICE VISIT (OUTPATIENT)
Dept: ORTHOPEDIC SURGERY | Age: 83
End: 2023-04-26

## 2023-04-26 VITALS — BODY MASS INDEX: 23.85 KG/M2 | WEIGHT: 161 LBS | HEIGHT: 69 IN

## 2023-04-26 DIAGNOSIS — Z47.89 ORTHOPEDIC AFTERCARE: Primary | ICD-10-CM

## 2023-04-26 NOTE — PROGRESS NOTES
demonstrate intramedullary nail fixation in unchanged alignment from intraoperative fluoroscopy imaging. There is no evidence of lucency or loosening. There is no fixation loss. There is early progressive periosteal callus noted about fracture margins. There is minor varus malalignment. There remains severe glenohumeral osteoarthritis. ASSESSMENT AND PLAN:  1 month status post right proximal humerus intramedullary nail fixation  Right shoulder rotator cuff arthropathy    I reviewed the x-rays with the patient and his wife today. I indicated there was good early healing of the proximal humerus in unchanged alignment. I would allow him to be more liberal about spending time out of the sling. He should use the sling during transfers and with mobility. I would allow him to use the upper extremity for light activities of daily living to include eating. Recommend a 5 pound maximum lifting restriction with that arm. I also indicated that he can use the right upper extremity for gentle assistance if necessary for getting out of a chair/wheelchair. I would allow him to continue with active elbow range of motion as tolerated. Recommend initiation of passive shoulder range of motion to tolerance under the direction of physical therapy effective the week of 5/8/2023. I reassured the patient that his mechanical type crepitus sensation in the shoulder is likely related to his pre-existing rotator cuff arthropathy. Recommend follow-up x-rays in 1 month. 3 months status post left proximal humerus fracture intramedullary nailing    Activities as tolerated with left upper extremity. Weightbearing as tolerated. No restrictions.     Susanne Romero MD

## 2023-05-01 ENCOUNTER — TELEPHONE (OUTPATIENT)
Dept: ORTHOPEDIC SURGERY | Age: 83
End: 2023-05-01

## 2023-05-01 NOTE — TELEPHONE ENCOUNTER
[address] [facility] [address] [place] Name: [id number]  Contact Name: Joseph Gutiérrez Number: [phone]  Request or Information: BRAD WOULD LIKE TO GET THE SUMMARY NOTES FROM THE PATIENT VISIT ON 04/26/23 FAX OVER TO THIS NUMBER [phone]

## 2023-05-24 ENCOUNTER — OFFICE VISIT (OUTPATIENT)
Dept: ORTHOPEDIC SURGERY | Age: 83
End: 2023-05-24

## 2023-05-24 VITALS — HEIGHT: 69 IN | BODY MASS INDEX: 23.85 KG/M2 | WEIGHT: 161 LBS

## 2023-05-24 DIAGNOSIS — Z47.89 ORTHOPEDIC AFTERCARE: Primary | ICD-10-CM

## 2023-05-24 PROCEDURE — 99024 POSTOP FOLLOW-UP VISIT: CPT | Performed by: ORTHOPAEDIC SURGERY

## 2023-05-24 NOTE — PROGRESS NOTES
intact to AIN, PIN, ulnar motor nerves. There is a strong palpable radial pulse, and brisk capillary refill to the fingers. Compartments are soft and compressible     RADIOGRAPHIC EXAM:  AP and lateral projections of the right humerus demonstrate intramedullary nail fixation in unchanged alignment from intraoperative fluoroscopy imaging. There is no evidence of lucency or loosening. There is no fixation loss. There is continued maturing progressive periosteal callus noted about fracture margins. There is minor varus malalignment. There remains severe glenohumeral osteoarthritis. ASSESSMENT AND PLAN:  2 month status post right proximal humerus intramedullary nail fixation  Right shoulder rotator cuff arthropathy     I reviewed the x-rays with the patient and his wife today. I indicated there is continued early healing of the proximal humerus in unchanged alignment. Discontinue sling. I would allow him to progress his activities as tolerated with the upper extremity including weightbearing if tolerated. Recommend initiation of active assisted shoulder range of motion to tolerance under the direction of physical therapy. The patient has severely limited range of motion related to glenohumeral osteoarthritis and this is likely to limit his result. I reassured the patient that his mechanical type crepitus sensation in the shoulder is likely related to his pre-existing rotator cuff arthropathy. Recommend follow-up x-rays in 6 weeks. We briefly discussed conversion of his right shoulder to a reverse total shoulder arthroplasty if appropriate healing is seen at next visit. We briefly discussed the risk, benefits, and alternatives as well as standard postoperative recovery. 4 months status post left proximal humerus fracture intramedullary nailing     Activities as tolerated with left upper extremity. Weightbearing as tolerated. No restrictions.      Obed Duenas MD

## 2023-07-05 ENCOUNTER — OFFICE VISIT (OUTPATIENT)
Dept: ORTHOPEDIC SURGERY | Age: 83
End: 2023-07-05

## 2023-07-05 VITALS — BODY MASS INDEX: 23.85 KG/M2 | WEIGHT: 161 LBS | HEIGHT: 69 IN

## 2023-07-05 DIAGNOSIS — M25.511 RIGHT SHOULDER PAIN, UNSPECIFIED CHRONICITY: ICD-10-CM

## 2023-07-05 DIAGNOSIS — Z47.89 ORTHOPEDIC AFTERCARE: Primary | ICD-10-CM

## 2023-07-05 PROCEDURE — 99024 POSTOP FOLLOW-UP VISIT: CPT | Performed by: ORTHOPAEDIC SURGERY

## 2023-07-05 NOTE — PROGRESS NOTES
ORTHOPAEDIC SURGERY FOLLOWUP VISIT     CHIEF COMPLAINT: Right arm     DATE OF INJURY: 3/21/2023  DIAGNOSIS: Right proximal humerus fracture  DATE OF SURGERY: 3/23/2023, intramedullary nailing of right proximal humerus fracture     HISTORY OF PRESENT ILLNESS:  26-year-old right-hand-dominant male presents for evaluation approximately 3.5 month status post intramedullary nail fixation of a comminuted displaced right proximal humerus fracture. He is also 5.5 months postop from left proximal humerus intramedullary nail fixation. He has not had any wound healing problems. He denies fevers, chills, or night sweats. Overall, he feels he is doing extremely well. He rates his pain 0 out of 10. He states that his pain is improved from its prefracture level. He had chronic rotator cuff disease which was bothering him at a level of 7 out of 10 on a day-to-day basis. He indicates that this has dissipated after his fracture surgery related to humerus intramedullary nailing. He is very happy. PHYSICAL EXAM:  General: Well-appearing. No distress. Presents in wheelchair. Right upper extremity: Incisions are maturely healed. There are no open areas. No signs of dehiscence. No surrounding erythema. Bruising/ecchymosis which was extensive initially has resolved. No upper extremity swelling. Elbow range of motion is full extension to 140 degrees of flexion without difficulty. Minor mechanical symptoms are present with range of motion of the elbow. Shoulder range of motion is 0 to 90 degrees forward flexion, 0 to 75 degrees abduction. There is mild crepitus during gentle shoulder rotational movements. Which was present at prior prefracture evaluation. Sensation is intact to light touch in median, radial, ulnar, and axillary distributions. Motor function is intact to AIN, PIN, ulnar motor nerves. There is a strong palpable radial pulse, and brisk capillary refill to the fingers.   Compartments are soft and

## 2023-08-07 ENCOUNTER — APPOINTMENT (OUTPATIENT)
Dept: CT IMAGING | Age: 83
DRG: 381 | End: 2023-08-07
Payer: OTHER GOVERNMENT

## 2023-08-07 ENCOUNTER — HOSPITAL ENCOUNTER (INPATIENT)
Age: 83
LOS: 2 days | Discharge: HOME | DRG: 381 | End: 2023-08-09
Attending: EMERGENCY MEDICINE | Admitting: INTERNAL MEDICINE
Payer: OTHER GOVERNMENT

## 2023-08-07 DIAGNOSIS — I95.9 HYPOTENSION, UNSPECIFIED HYPOTENSION TYPE: ICD-10-CM

## 2023-08-07 DIAGNOSIS — K92.1 MELENA: ICD-10-CM

## 2023-08-07 DIAGNOSIS — K92.2 GASTROINTESTINAL HEMORRHAGE, UNSPECIFIED GASTROINTESTINAL HEMORRHAGE TYPE: Primary | ICD-10-CM

## 2023-08-07 LAB
ABO + RH BLD: NORMAL
ALBUMIN SERPL-MCNC: 3.5 G/DL (ref 3.4–5)
ALBUMIN/GLOB SERPL: 1 {RATIO} (ref 1.1–2.2)
ALP SERPL-CCNC: 70 U/L (ref 40–129)
ALT SERPL-CCNC: 7 U/L (ref 10–40)
ANION GAP SERPL CALCULATED.3IONS-SCNC: 15 MMOL/L (ref 3–16)
AST SERPL-CCNC: 12 U/L (ref 15–37)
BASOPHILS # BLD: 0.1 K/UL (ref 0–0.2)
BASOPHILS NFR BLD: 0.9 %
BILIRUB SERPL-MCNC: 0.3 MG/DL (ref 0–1)
BILIRUB UR QL STRIP.AUTO: NEGATIVE
BLD GP AB SCN SERPL QL: NORMAL
BUN SERPL-MCNC: 79 MG/DL (ref 7–20)
CALCIUM SERPL-MCNC: 9.4 MG/DL (ref 8.3–10.6)
CHLORIDE SERPL-SCNC: 100 MMOL/L (ref 99–110)
CLARITY UR: CLEAR
CO2 SERPL-SCNC: 16 MMOL/L (ref 21–32)
COLOR UR: YELLOW
CREAT SERPL-MCNC: 1.8 MG/DL (ref 0.8–1.3)
DEPRECATED RDW RBC AUTO: 17.3 % (ref 12.4–15.4)
EOSINOPHIL # BLD: 0.1 K/UL (ref 0–0.6)
EOSINOPHIL NFR BLD: 0.7 %
GFR SERPLBLD CREATININE-BSD FMLA CKD-EPI: 37 ML/MIN/{1.73_M2}
GLUCOSE SERPL-MCNC: 123 MG/DL (ref 70–99)
GLUCOSE UR STRIP.AUTO-MCNC: NEGATIVE MG/DL
HCT VFR BLD AUTO: 25.3 % (ref 40.5–52.5)
HCT VFR BLD AUTO: 25.7 % (ref 40.5–52.5)
HEMOCCULT SP1 STL QL: ABNORMAL
HGB BLD-MCNC: 8.6 G/DL (ref 13.5–17.5)
HGB BLD-MCNC: 8.7 G/DL (ref 13.5–17.5)
HGB UR QL STRIP.AUTO: NEGATIVE
INR PPP: 1.24 (ref 0.84–1.16)
KETONES UR STRIP.AUTO-MCNC: NEGATIVE MG/DL
LACTATE BLDV-SCNC: 1.8 MMOL/L (ref 0.4–2)
LACTATE BLDV-SCNC: 2.1 MMOL/L (ref 0.4–2)
LEUKOCYTE ESTERASE UR QL STRIP.AUTO: NEGATIVE
LIPASE SERPL-CCNC: 29 U/L (ref 13–60)
LYMPHOCYTES # BLD: 2.6 K/UL (ref 1–5.1)
LYMPHOCYTES NFR BLD: 17.9 %
MCH RBC QN AUTO: 32.9 PG (ref 26–34)
MCHC RBC AUTO-ENTMCNC: 33.4 G/DL (ref 31–36)
MCV RBC AUTO: 98.5 FL (ref 80–100)
MONOCYTES # BLD: 1 K/UL (ref 0–1.3)
MONOCYTES NFR BLD: 7.1 %
NEUTROPHILS # BLD: 10.5 K/UL (ref 1.7–7.7)
NEUTROPHILS NFR BLD: 73.4 %
NITRITE UR QL STRIP.AUTO: NEGATIVE
PH UR STRIP.AUTO: 6 [PH] (ref 5–8)
PLATELET # BLD AUTO: 377 K/UL (ref 135–450)
PMV BLD AUTO: 7.8 FL (ref 5–10.5)
POTASSIUM SERPL-SCNC: 4 MMOL/L (ref 3.5–5.1)
PROT SERPL-MCNC: 7 G/DL (ref 6.4–8.2)
PROT UR STRIP.AUTO-MCNC: NEGATIVE MG/DL
PROTHROMBIN TIME: 15.6 SEC (ref 11.5–14.8)
RBC # BLD AUTO: 2.61 M/UL (ref 4.2–5.9)
SODIUM SERPL-SCNC: 131 MMOL/L (ref 136–145)
SP GR UR STRIP.AUTO: <=1.005 (ref 1–1.03)
TROPONIN, HIGH SENSITIVITY: 51 NG/L (ref 0–22)
TROPONIN, HIGH SENSITIVITY: 59 NG/L (ref 0–22)
UA COMPLETE W REFLEX CULTURE PNL UR: NORMAL
UA DIPSTICK W REFLEX MICRO PNL UR: NORMAL
URN SPEC COLLECT METH UR: NORMAL
UROBILINOGEN UR STRIP-ACNC: 0.2 E.U./DL
WBC # BLD AUTO: 14.3 K/UL (ref 4–11)

## 2023-08-07 PROCEDURE — 80053 COMPREHEN METABOLIC PANEL: CPT

## 2023-08-07 PROCEDURE — 2580000003 HC RX 258: Performed by: PHYSICIAN ASSISTANT

## 2023-08-07 PROCEDURE — 86901 BLOOD TYPING SEROLOGIC RH(D): CPT

## 2023-08-07 PROCEDURE — 85014 HEMATOCRIT: CPT

## 2023-08-07 PROCEDURE — 86850 RBC ANTIBODY SCREEN: CPT

## 2023-08-07 PROCEDURE — 85610 PROTHROMBIN TIME: CPT

## 2023-08-07 PROCEDURE — 83605 ASSAY OF LACTIC ACID: CPT

## 2023-08-07 PROCEDURE — 36415 COLL VENOUS BLD VENIPUNCTURE: CPT

## 2023-08-07 PROCEDURE — 83690 ASSAY OF LIPASE: CPT

## 2023-08-07 PROCEDURE — 82270 OCCULT BLOOD FECES: CPT

## 2023-08-07 PROCEDURE — P9016 RBC LEUKOCYTES REDUCED: HCPCS

## 2023-08-07 PROCEDURE — 6370000000 HC RX 637 (ALT 250 FOR IP): Performed by: INTERNAL MEDICINE

## 2023-08-07 PROCEDURE — 30233N1 TRANSFUSION OF NONAUTOLOGOUS RED BLOOD CELLS INTO PERIPHERAL VEIN, PERCUTANEOUS APPROACH: ICD-10-PCS | Performed by: INTERNAL MEDICINE

## 2023-08-07 PROCEDURE — 85025 COMPLETE CBC W/AUTO DIFF WBC: CPT

## 2023-08-07 PROCEDURE — 6360000004 HC RX CONTRAST MEDICATION: Performed by: PHYSICIAN ASSISTANT

## 2023-08-07 PROCEDURE — 85018 HEMOGLOBIN: CPT

## 2023-08-07 PROCEDURE — 86923 COMPATIBILITY TEST ELECTRIC: CPT

## 2023-08-07 PROCEDURE — 6360000002 HC RX W HCPCS: Performed by: PHYSICIAN ASSISTANT

## 2023-08-07 PROCEDURE — 2580000003 HC RX 258: Performed by: INTERNAL MEDICINE

## 2023-08-07 PROCEDURE — 86900 BLOOD TYPING SEROLOGIC ABO: CPT

## 2023-08-07 PROCEDURE — 84484 ASSAY OF TROPONIN QUANT: CPT

## 2023-08-07 PROCEDURE — 96361 HYDRATE IV INFUSION ADD-ON: CPT

## 2023-08-07 PROCEDURE — 96374 THER/PROPH/DIAG INJ IV PUSH: CPT

## 2023-08-07 PROCEDURE — 2060000000 HC ICU INTERMEDIATE R&B

## 2023-08-07 PROCEDURE — 99285 EMERGENCY DEPT VISIT HI MDM: CPT

## 2023-08-07 PROCEDURE — 6360000002 HC RX W HCPCS: Performed by: INTERNAL MEDICINE

## 2023-08-07 PROCEDURE — 81003 URINALYSIS AUTO W/O SCOPE: CPT

## 2023-08-07 PROCEDURE — 74174 CTA ABD&PLVS W/CONTRAST: CPT

## 2023-08-07 PROCEDURE — C9113 INJ PANTOPRAZOLE SODIUM, VIA: HCPCS | Performed by: PHYSICIAN ASSISTANT

## 2023-08-07 PROCEDURE — C9113 INJ PANTOPRAZOLE SODIUM, VIA: HCPCS | Performed by: INTERNAL MEDICINE

## 2023-08-07 RX ORDER — ATORVASTATIN CALCIUM 80 MG/1
TABLET, FILM COATED ORAL
COMMUNITY

## 2023-08-07 RX ORDER — VITAMIN B COMPLEX
1000 TABLET ORAL DAILY
Status: DISCONTINUED | OUTPATIENT
Start: 2023-08-08 | End: 2023-08-09 | Stop reason: HOSPADM

## 2023-08-07 RX ORDER — FINASTERIDE 5 MG/1
5 TABLET, FILM COATED ORAL DAILY
Status: DISCONTINUED | OUTPATIENT
Start: 2023-08-08 | End: 2023-08-09 | Stop reason: HOSPADM

## 2023-08-07 RX ORDER — ALBUTEROL SULFATE 90 UG/1
2 AEROSOL, METERED RESPIRATORY (INHALATION) EVERY 4 HOURS PRN
Status: DISCONTINUED | OUTPATIENT
Start: 2023-08-07 | End: 2023-08-09 | Stop reason: HOSPADM

## 2023-08-07 RX ORDER — FLUTICASONE PROPIONATE 50 MCG
2 SPRAY, SUSPENSION (ML) NASAL DAILY
Status: DISCONTINUED | OUTPATIENT
Start: 2023-08-08 | End: 2023-08-09 | Stop reason: HOSPADM

## 2023-08-07 RX ORDER — KETOTIFEN FUMARATE 0.35 MG/ML
1 SOLUTION/ DROPS OPHTHALMIC 2 TIMES DAILY
Status: DISCONTINUED | OUTPATIENT
Start: 2023-08-08 | End: 2023-08-09 | Stop reason: HOSPADM

## 2023-08-07 RX ORDER — ROSUVASTATIN CALCIUM 10 MG/1
20 TABLET, COATED ORAL EVERY EVENING
Status: DISCONTINUED | OUTPATIENT
Start: 2023-08-07 | End: 2023-08-09

## 2023-08-07 RX ORDER — ACETAMINOPHEN 650 MG/1
650 SUPPOSITORY RECTAL EVERY 6 HOURS PRN
Status: DISCONTINUED | OUTPATIENT
Start: 2023-08-07 | End: 2023-08-09 | Stop reason: HOSPADM

## 2023-08-07 RX ORDER — BRIMONIDINE TARTRATE 2 MG/ML
1 SOLUTION/ DROPS OPHTHALMIC 2 TIMES DAILY
Status: DISCONTINUED | OUTPATIENT
Start: 2023-08-08 | End: 2023-08-09 | Stop reason: HOSPADM

## 2023-08-07 RX ORDER — SODIUM CHLORIDE 9 MG/ML
1000 INJECTION, SOLUTION INTRAVENOUS CONTINUOUS
Status: DISCONTINUED | OUTPATIENT
Start: 2023-08-07 | End: 2023-08-07

## 2023-08-07 RX ORDER — BUPROPION HYDROCHLORIDE 100 MG/1
100 TABLET, EXTENDED RELEASE ORAL DAILY
Status: DISCONTINUED | OUTPATIENT
Start: 2023-08-08 | End: 2023-08-09 | Stop reason: HOSPADM

## 2023-08-07 RX ORDER — ONDANSETRON 4 MG/1
4 TABLET, ORALLY DISINTEGRATING ORAL EVERY 8 HOURS PRN
Status: DISCONTINUED | OUTPATIENT
Start: 2023-08-07 | End: 2023-08-09 | Stop reason: HOSPADM

## 2023-08-07 RX ORDER — PANTOPRAZOLE SODIUM 40 MG/10ML
80 INJECTION, POWDER, LYOPHILIZED, FOR SOLUTION INTRAVENOUS ONCE
Status: COMPLETED | OUTPATIENT
Start: 2023-08-07 | End: 2023-08-07

## 2023-08-07 RX ORDER — SODIUM CHLORIDE 0.9 % (FLUSH) 0.9 %
5-40 SYRINGE (ML) INJECTION EVERY 12 HOURS SCHEDULED
Status: DISCONTINUED | OUTPATIENT
Start: 2023-08-07 | End: 2023-08-09 | Stop reason: HOSPADM

## 2023-08-07 RX ORDER — LATANOPROST 50 UG/ML
1 SOLUTION/ DROPS OPHTHALMIC NIGHTLY
Status: DISCONTINUED | OUTPATIENT
Start: 2023-08-07 | End: 2023-08-09 | Stop reason: HOSPADM

## 2023-08-07 RX ORDER — ESCITALOPRAM OXALATE 10 MG/1
20 TABLET ORAL DAILY
Status: DISCONTINUED | OUTPATIENT
Start: 2023-08-08 | End: 2023-08-09 | Stop reason: HOSPADM

## 2023-08-07 RX ORDER — SODIUM CHLORIDE 9 MG/ML
INJECTION, SOLUTION INTRAVENOUS CONTINUOUS
Status: DISCONTINUED | OUTPATIENT
Start: 2023-08-07 | End: 2023-08-09 | Stop reason: HOSPADM

## 2023-08-07 RX ORDER — SODIUM CHLORIDE 0.9 % (FLUSH) 0.9 %
5-40 SYRINGE (ML) INJECTION PRN
Status: DISCONTINUED | OUTPATIENT
Start: 2023-08-07 | End: 2023-08-09 | Stop reason: HOSPADM

## 2023-08-07 RX ORDER — DORZOLAMIDE HYDROCHLORIDE AND TIMOLOL MALEATE 20; 5 MG/ML; MG/ML
1 SOLUTION/ DROPS OPHTHALMIC 2 TIMES DAILY
Status: DISCONTINUED | OUTPATIENT
Start: 2023-08-08 | End: 2023-08-09 | Stop reason: HOSPADM

## 2023-08-07 RX ORDER — IPRATROPIUM BROMIDE 21 UG/1
2 SPRAY, METERED NASAL EVERY 6 HOURS PRN
Status: DISCONTINUED | OUTPATIENT
Start: 2023-08-07 | End: 2023-08-09 | Stop reason: HOSPADM

## 2023-08-07 RX ORDER — GABAPENTIN 100 MG/1
200 CAPSULE ORAL 3 TIMES DAILY
Status: DISCONTINUED | OUTPATIENT
Start: 2023-08-07 | End: 2023-08-09 | Stop reason: HOSPADM

## 2023-08-07 RX ORDER — SODIUM CHLORIDE 9 MG/ML
INJECTION, SOLUTION INTRAVENOUS PRN
Status: DISCONTINUED | OUTPATIENT
Start: 2023-08-07 | End: 2023-08-09 | Stop reason: HOSPADM

## 2023-08-07 RX ORDER — ONDANSETRON 2 MG/ML
4 INJECTION INTRAMUSCULAR; INTRAVENOUS EVERY 6 HOURS PRN
Status: DISCONTINUED | OUTPATIENT
Start: 2023-08-07 | End: 2023-08-09 | Stop reason: HOSPADM

## 2023-08-07 RX ORDER — ACETAMINOPHEN 325 MG/1
650 TABLET ORAL EVERY 6 HOURS PRN
Status: DISCONTINUED | OUTPATIENT
Start: 2023-08-07 | End: 2023-08-09 | Stop reason: HOSPADM

## 2023-08-07 RX ORDER — 0.9 % SODIUM CHLORIDE 0.9 %
1000 INTRAVENOUS SOLUTION INTRAVENOUS ONCE
Status: COMPLETED | OUTPATIENT
Start: 2023-08-07 | End: 2023-08-07

## 2023-08-07 RX ADMIN — PANTOPRAZOLE SODIUM 80 MG: 40 INJECTION, POWDER, FOR SOLUTION INTRAVENOUS at 21:22

## 2023-08-07 RX ADMIN — SODIUM CHLORIDE 1000 ML: 9 INJECTION, SOLUTION INTRAVENOUS at 16:20

## 2023-08-07 RX ADMIN — SODIUM CHLORIDE 8 MG/HR: 9 INJECTION, SOLUTION INTRAVENOUS at 21:46

## 2023-08-07 RX ADMIN — IOPAMIDOL 75 ML: 755 INJECTION, SOLUTION INTRAVENOUS at 18:42

## 2023-08-07 ASSESSMENT — PAIN - FUNCTIONAL ASSESSMENT: PAIN_FUNCTIONAL_ASSESSMENT: NONE - DENIES PAIN

## 2023-08-07 NOTE — ED NOTES
Patient observed for first 15 minutes of blood transfusion. No suspected signs of transfusion reaction; vital signs remain stable.      Chitra Bailey RN  08/07/23 7906

## 2023-08-07 NOTE — ED NOTES
Blood consent obtained prior to blood administration. Signed and witnessed by 2x RN.        Marty Brumfield RN  08/07/23 9904

## 2023-08-07 NOTE — ED NOTES
Patient wife voices concern of possible TIA. Patient wife states patient had episode of confusion/ \"not responding\" earlier in the day. Nikky PUENTES aware and at bedside to discuss concerns with family.       Marian Christopher RN  08/07/23 8683

## 2023-08-07 NOTE — ED PROVIDER NOTES
I independently performed a history and physical on Nicolasa Live. All diagnostic, treatment, and disposition decisions were made by myself in conjunction with the advanced practice provider/resident. For further details of [facility] emergency department encounter, please see the MONISHA/resident's documentation. I personally saw the patient and performed a substantive portion of the visit including all aspects of the medical decision making. Briefly, this is a 79-year-old male presenting for evaluation of rectal bleeding. He was brought in by EMS. He reportedly has had black and tarry stool. He reports no history of GI bleed. He is on aspirin. His initial blood pressure is in the 70s to 80s. We will transfuse 1 unit of blood emergently. He otherwise has intact mentation no focal neurological deficits. No abdominal distention or abdominal pain. Will obtain CT abdomen pelvis for further evaluation of hemodynamically significant GI bleed, he will ultimately require admission for further evaluation and management. EKG  The Ekg interpreted by myself in the emergency department in the absence of a cardiologist.  normal sinus rhythm with a rate of 70  Axis is   Normal  QTc is  normal  Intervals and Durations are unremarkable. No specific ST-T wave changes appreciated. No evidence of acute ischemia. No significant change from prior EKG dated 1/20/23      I personally saw the patient and independently provided 35 minutes of non-concurrent critical care out of the total shared critical care time provided. I, Dr. Maritza Peng, am the primary clinician of record. Comment: Please note this report has been produced using speech recognition software and may contain errors related to that system including errors in grammar, punctuation, and spelling, as well as words and phrases that may be inappropriate.  If there are any questions or concerns please feel free to contact the dictating
spray 2 sprays by Each Nostril route daily      polyethyl glycol-propyl glycol 0.4-0.3 % (SYSTANE) 0.4-0.3 % ophthalmic solution Place 1-2 drops into both eyes as needed for Dry Eyes      lidocaine (LIDODERM) 5 % Place 1 patch onto the skin daily 12 hours on, 12 hours off. For right rib pain      methocarbamol (ROBAXIN) 500 MG tablet Take 500 mg by mouth 2 times daily as needed (muscle spasms)      finasteride (PROSCAR) 5 MG tablet Take 1 tablet by mouth daily 30 tablet 3    gabapentin (NEURONTIN) 100 MG capsule Take 200 mg by mouth 3 times daily. 2 caps TI      ipratropium (ATROVENT) 0.03 % nasal spray 2 sprays by Each Nostril route every 6 hours as needed for Rhinitis      ketotifen (ZADITOR) 0.025 % ophthalmic solution Place 1 drop into both eyes 2 times daily      mometasone (ASMANEX) 220 MCG/ACT AEPB inhaler Inhale 1 puff into the lungs at bedtime Rinse mouth after use      polyethylene glycol (GLYCOLAX) 17 g packet Take 0.015 g by mouth daily (Mix 1 tablespoonful in 4-8 ounces of liquid)      rosuvastatin (CRESTOR) 40 MG tablet Take 20 mg by mouth every evening      selenium sulfide (SELSUN) 2.5 % lotion Apply topically three times a week Shampoo, use 3 x week , leave on for 5 minutes then rinse      Ubrogepant 100 MG TABS One tab by mouth at onset of migraine,do not exceed 200mg/24 hours. Ok to repeat in 2 hours if headache continues.       pantoprazole (PROTONIX) 40 MG tablet Take 40 mg by mouth every morning (before breakfast)      escitalopram (LEXAPRO) 20 MG tablet Take 20 mg by mouth daily      aspirin 81 MG chewable tablet Take 1 tablet by mouth daily 30 tablet 0    carvedilol (COREG) 12.5 MG tablet Take 12.5 mg by mouth 2 times daily       lisinopril (PRINIVIL;ZESTRIL) 20 MG tablet Take 10 mg by mouth daily      latanoprost (XALATAN) 0.005 % ophthalmic solution Place 1 drop into both eyes nightly       Allergies   Allergen Reactions    Pcn [Penicillins] Anaphylaxis    Amlodipine Swelling and Itching

## 2023-08-08 ENCOUNTER — ANESTHESIA (OUTPATIENT)
Dept: ENDOSCOPY | Age: 83
DRG: 381 | End: 2023-08-08
Payer: OTHER GOVERNMENT

## 2023-08-08 ENCOUNTER — ANESTHESIA EVENT (OUTPATIENT)
Dept: ENDOSCOPY | Age: 83
DRG: 381 | End: 2023-08-08
Payer: OTHER GOVERNMENT

## 2023-08-08 ENCOUNTER — APPOINTMENT (OUTPATIENT)
Dept: MRI IMAGING | Age: 83
DRG: 381 | End: 2023-08-08
Payer: OTHER GOVERNMENT

## 2023-08-08 PROBLEM — N17.9 AKI (ACUTE KIDNEY INJURY) (HCC): Status: ACTIVE | Noted: 2023-08-08

## 2023-08-08 PROBLEM — I95.9 HYPOTENSION: Status: ACTIVE | Noted: 2023-08-08

## 2023-08-08 PROBLEM — F32.A DEPRESSIVE DISORDER: Status: ACTIVE | Noted: 2023-08-08

## 2023-08-08 LAB
ANION GAP SERPL CALCULATED.3IONS-SCNC: 13 MMOL/L (ref 3–16)
APTT BLD: 28.1 SEC (ref 22.7–35.9)
BUN SERPL-MCNC: 64 MG/DL (ref 7–20)
CALCIUM SERPL-MCNC: 8.6 MG/DL (ref 8.3–10.6)
CHLORIDE SERPL-SCNC: 108 MMOL/L (ref 99–110)
CO2 SERPL-SCNC: 16 MMOL/L (ref 21–32)
CREAT SERPL-MCNC: 1.7 MG/DL (ref 0.8–1.3)
GFR SERPLBLD CREATININE-BSD FMLA CKD-EPI: 39 ML/MIN/{1.73_M2}
GLUCOSE BLD-MCNC: 99 MG/DL (ref 70–99)
GLUCOSE SERPL-MCNC: 85 MG/DL (ref 70–99)
HCT VFR BLD AUTO: 24 % (ref 40.5–52.5)
HCT VFR BLD AUTO: 24.4 % (ref 40.5–52.5)
HCT VFR BLD AUTO: 25.5 % (ref 40.5–52.5)
HCT VFR BLD AUTO: 25.7 % (ref 40.5–52.5)
HGB BLD-MCNC: 8.2 G/DL (ref 13.5–17.5)
HGB BLD-MCNC: 8.3 G/DL (ref 13.5–17.5)
HGB BLD-MCNC: 8.6 G/DL (ref 13.5–17.5)
HGB BLD-MCNC: 8.9 G/DL (ref 13.5–17.5)
INR PPP: 1.28 (ref 0.84–1.16)
IRON SATN MFR SERPL: 46 % (ref 20–50)
IRON SERPL-MCNC: 120 UG/DL (ref 59–158)
MAGNESIUM SERPL-MCNC: 1.8 MG/DL (ref 1.8–2.4)
PERFORMED ON: NORMAL
POTASSIUM SERPL-SCNC: 3.4 MMOL/L (ref 3.5–5.1)
PROTHROMBIN TIME: 16 SEC (ref 11.5–14.8)
SODIUM SERPL-SCNC: 137 MMOL/L (ref 136–145)
TIBC SERPL-MCNC: 261 UG/DL (ref 260–445)
TROPONIN, HIGH SENSITIVITY: 58 NG/L (ref 0–22)
TROPONIN, HIGH SENSITIVITY: 63 NG/L (ref 0–22)

## 2023-08-08 PROCEDURE — 94761 N-INVAS EAR/PLS OXIMETRY MLT: CPT

## 2023-08-08 PROCEDURE — 0DB68ZX EXCISION OF STOMACH, VIA NATURAL OR ARTIFICIAL OPENING ENDOSCOPIC, DIAGNOSTIC: ICD-10-PCS | Performed by: INTERNAL MEDICINE

## 2023-08-08 PROCEDURE — 83550 IRON BINDING TEST: CPT

## 2023-08-08 PROCEDURE — 83735 ASSAY OF MAGNESIUM: CPT

## 2023-08-08 PROCEDURE — 3700000000 HC ANESTHESIA ATTENDED CARE: Performed by: INTERNAL MEDICINE

## 2023-08-08 PROCEDURE — 7100000010 HC PHASE II RECOVERY - FIRST 15 MIN: Performed by: INTERNAL MEDICINE

## 2023-08-08 PROCEDURE — 88305 TISSUE EXAM BY PATHOLOGIST: CPT

## 2023-08-08 PROCEDURE — 3609012400 HC EGD TRANSORAL BIOPSY SINGLE/MULTIPLE: Performed by: INTERNAL MEDICINE

## 2023-08-08 PROCEDURE — 6370000000 HC RX 637 (ALT 250 FOR IP)

## 2023-08-08 PROCEDURE — 88342 IMHCHEM/IMCYTCHM 1ST ANTB: CPT

## 2023-08-08 PROCEDURE — 3700000001 HC ADD 15 MINUTES (ANESTHESIA): Performed by: INTERNAL MEDICINE

## 2023-08-08 PROCEDURE — 2060000000 HC ICU INTERMEDIATE R&B

## 2023-08-08 PROCEDURE — 6360000002 HC RX W HCPCS: Performed by: INTERNAL MEDICINE

## 2023-08-08 PROCEDURE — 6370000000 HC RX 637 (ALT 250 FOR IP): Performed by: ANESTHESIOLOGY

## 2023-08-08 PROCEDURE — 2500000003 HC RX 250 WO HCPCS: Performed by: NURSE ANESTHETIST, CERTIFIED REGISTERED

## 2023-08-08 PROCEDURE — 70551 MRI BRAIN STEM W/O DYE: CPT

## 2023-08-08 PROCEDURE — 85610 PROTHROMBIN TIME: CPT

## 2023-08-08 PROCEDURE — 2580000003 HC RX 258: Performed by: INTERNAL MEDICINE

## 2023-08-08 PROCEDURE — 84484 ASSAY OF TROPONIN QUANT: CPT

## 2023-08-08 PROCEDURE — 85730 THROMBOPLASTIN TIME PARTIAL: CPT

## 2023-08-08 PROCEDURE — 80048 BASIC METABOLIC PNL TOTAL CA: CPT

## 2023-08-08 PROCEDURE — 7100000011 HC PHASE II RECOVERY - ADDTL 15 MIN: Performed by: INTERNAL MEDICINE

## 2023-08-08 PROCEDURE — 6360000002 HC RX W HCPCS: Performed by: NURSE ANESTHETIST, CERTIFIED REGISTERED

## 2023-08-08 PROCEDURE — 2700000000 HC OXYGEN THERAPY PER DAY

## 2023-08-08 PROCEDURE — 83540 ASSAY OF IRON: CPT

## 2023-08-08 PROCEDURE — 2709999900 HC NON-CHARGEABLE SUPPLY: Performed by: INTERNAL MEDICINE

## 2023-08-08 PROCEDURE — 85014 HEMATOCRIT: CPT

## 2023-08-08 PROCEDURE — 36415 COLL VENOUS BLD VENIPUNCTURE: CPT

## 2023-08-08 PROCEDURE — C9113 INJ PANTOPRAZOLE SODIUM, VIA: HCPCS | Performed by: INTERNAL MEDICINE

## 2023-08-08 PROCEDURE — 6370000000 HC RX 637 (ALT 250 FOR IP): Performed by: INTERNAL MEDICINE

## 2023-08-08 PROCEDURE — 85018 HEMOGLOBIN: CPT

## 2023-08-08 PROCEDURE — 0DB38ZX EXCISION OF LOWER ESOPHAGUS, VIA NATURAL OR ARTIFICIAL OPENING ENDOSCOPIC, DIAGNOSTIC: ICD-10-PCS | Performed by: INTERNAL MEDICINE

## 2023-08-08 RX ORDER — PROPOFOL 10 MG/ML
INJECTION, EMULSION INTRAVENOUS PRN
Status: DISCONTINUED | OUTPATIENT
Start: 2023-08-08 | End: 2023-08-08 | Stop reason: SDUPTHER

## 2023-08-08 RX ORDER — LIDOCAINE HYDROCHLORIDE 20 MG/ML
INJECTION, SOLUTION INFILTRATION; PERINEURAL PRN
Status: DISCONTINUED | OUTPATIENT
Start: 2023-08-08 | End: 2023-08-08 | Stop reason: SDUPTHER

## 2023-08-08 RX ORDER — POTASSIUM CHLORIDE 20 MEQ/1
40 TABLET, EXTENDED RELEASE ORAL ONCE
Status: COMPLETED | OUTPATIENT
Start: 2023-08-08 | End: 2023-08-08

## 2023-08-08 RX ORDER — PHENYLEPHRINE HCL IN 0.9% NACL 1 MG/10 ML
SYRINGE (ML) INTRAVENOUS PRN
Status: DISCONTINUED | OUTPATIENT
Start: 2023-08-08 | End: 2023-08-08 | Stop reason: SDUPTHER

## 2023-08-08 RX ORDER — PANTOPRAZOLE SODIUM 40 MG/1
40 TABLET, DELAYED RELEASE ORAL
Status: DISCONTINUED | OUTPATIENT
Start: 2023-08-08 | End: 2023-08-09 | Stop reason: HOSPADM

## 2023-08-08 RX ORDER — GLYCOPYRROLATE 0.2 MG/ML
INJECTION INTRAMUSCULAR; INTRAVENOUS PRN
Status: DISCONTINUED | OUTPATIENT
Start: 2023-08-08 | End: 2023-08-08 | Stop reason: SDUPTHER

## 2023-08-08 RX ORDER — IPRATROPIUM BROMIDE AND ALBUTEROL SULFATE 2.5; .5 MG/3ML; MG/3ML
1 SOLUTION RESPIRATORY (INHALATION) ONCE
Status: COMPLETED | OUTPATIENT
Start: 2023-08-08 | End: 2023-08-08

## 2023-08-08 RX ADMIN — Medication 10 ML: at 20:18

## 2023-08-08 RX ADMIN — BUPROPION HYDROCHLORIDE 100 MG: 100 TABLET, EXTENDED RELEASE ORAL at 09:59

## 2023-08-08 RX ADMIN — DORZOLAMIDE HYDROCHLORIDE AND TIMOLOL MALEATE 1 DROP: 20; 5 SOLUTION/ DROPS OPHTHALMIC at 09:58

## 2023-08-08 RX ADMIN — BRIMONIDINE TARTRATE 1 DROP: 2 SOLUTION OPHTHALMIC at 09:58

## 2023-08-08 RX ADMIN — ROSUVASTATIN 20 MG: 10 TABLET, FILM COATED ORAL at 18:23

## 2023-08-08 RX ADMIN — GABAPENTIN 200 MG: 100 CAPSULE ORAL at 00:09

## 2023-08-08 RX ADMIN — BRIMONIDINE TARTRATE 1 DROP: 2 SOLUTION OPHTHALMIC at 20:15

## 2023-08-08 RX ADMIN — Medication 100 MCG: at 12:23

## 2023-08-08 RX ADMIN — GABAPENTIN 200 MG: 100 CAPSULE ORAL at 20:15

## 2023-08-08 RX ADMIN — DORZOLAMIDE HYDROCHLORIDE AND TIMOLOL MALEATE 1 DROP: 20; 5 SOLUTION/ DROPS OPHTHALMIC at 20:15

## 2023-08-08 RX ADMIN — Medication 200 MCG: at 12:26

## 2023-08-08 RX ADMIN — IPRATROPIUM BROMIDE AND ALBUTEROL SULFATE 1 DOSE: 2.5; .5 SOLUTION RESPIRATORY (INHALATION) at 13:19

## 2023-08-08 RX ADMIN — ROSUVASTATIN 20 MG: 10 TABLET, FILM COATED ORAL at 00:09

## 2023-08-08 RX ADMIN — POTASSIUM CHLORIDE 40 MEQ: 1500 TABLET, EXTENDED RELEASE ORAL at 09:59

## 2023-08-08 RX ADMIN — PROPOFOL 200 MG: 10 INJECTION, EMULSION INTRAVENOUS at 12:06

## 2023-08-08 RX ADMIN — SODIUM CHLORIDE 8 MG/HR: 9 INJECTION, SOLUTION INTRAVENOUS at 06:07

## 2023-08-08 RX ADMIN — FLUTICASONE PROPIONATE 2 SPRAY: 50 SPRAY, METERED NASAL at 09:59

## 2023-08-08 RX ADMIN — ESCITALOPRAM 20 MG: 10 TABLET, FILM COATED ORAL at 09:58

## 2023-08-08 RX ADMIN — LATANOPROST 1 DROP: 50 SOLUTION OPHTHALMIC at 20:21

## 2023-08-08 RX ADMIN — KETOTIFEN FUMARATE 1 DROP: 0.25 SOLUTION/ DROPS OPHTHALMIC at 09:58

## 2023-08-08 RX ADMIN — KETOTIFEN FUMARATE 1 DROP: 0.25 SOLUTION/ DROPS OPHTHALMIC at 20:15

## 2023-08-08 RX ADMIN — GLYCOPYRROLATE 0.2 MG: 0.2 INJECTION, SOLUTION INTRAMUSCULAR; INTRAVENOUS at 12:27

## 2023-08-08 RX ADMIN — LIDOCAINE HYDROCHLORIDE 60 MG: 20 INJECTION, SOLUTION INFILTRATION; PERINEURAL at 12:06

## 2023-08-08 RX ADMIN — Medication 100 MCG: at 12:20

## 2023-08-08 RX ADMIN — SODIUM CHLORIDE: 9 INJECTION, SOLUTION INTRAVENOUS at 00:11

## 2023-08-08 RX ADMIN — FINASTERIDE 5 MG: 5 TABLET, FILM COATED ORAL at 09:58

## 2023-08-08 RX ADMIN — PANTOPRAZOLE SODIUM 40 MG: 40 TABLET, DELAYED RELEASE ORAL at 15:51

## 2023-08-08 RX ADMIN — Medication 1000 UNITS: at 09:59

## 2023-08-08 RX ADMIN — Medication 10 ML: at 10:07

## 2023-08-08 ASSESSMENT — PAIN SCALES - GENERAL
PAINLEVEL_OUTOF10: 0

## 2023-08-08 ASSESSMENT — LIFESTYLE VARIABLES: SMOKING_STATUS: 0

## 2023-08-08 ASSESSMENT — COPD QUESTIONNAIRES: CAT_SEVERITY: MODERATE

## 2023-08-08 NOTE — PROCEDURES
condition. There were no immediate complications. Impression:  -   1. A clean based ulcer with slightly raised margins noted in the distal esophagus at 30 cm. Biopsies obtained from the edge of the ulcer at 30 cm. 2.  Circumferential columnar mucosa noted between 20 cm to 30 cm from the incisors. Random esophageal biopsies obtained to confirm Luque's esophagus, deferred 4 quadrant biopsies as there was mild esophagitis, will treat esophagitis and repeat endoscopy as outpatient. 3. A 10 cm sliding hiatal hernia noted with diaphragmatic pinch at 40 cm. 4. Gastric erosions and small healing gastric ulcer seen in antrum, random gastric biopsies obtained to r/o H. Pylori  5. Normal duodenum      Recommendations: -  Follow up gastric biopsies and treat H.Pylori if positive  Pantoprazole 40 mg PO twice a day FOR 12 WEEKS  Repeat EGD in 8-12 weeks to confirm ulcer healing and obtained 4 qudrant esophageal biopsies  Avoid NSAIDs  Ok to resume diet    Aristeo Joaquin MD, MD  [address]    Please note that some or all of this record was generated using voice recognition software. If there are any questions about the content of this document, please contact the author as some errors in translation may have occurred.

## 2023-08-08 NOTE — PLAN OF CARE
Problem: Discharge Planning  Goal: Discharge to home or other facility with appropriate resources  8/8/2023 1557 by Bennie Reeves RN  Outcome: Progressing     Problem: Safety - Adult  Goal: Free from fall injury  8/8/2023 1557 by Bennie Reeves RN  Outcome: Progressing     Problem: Skin/Tissue Integrity  Goal: Absence of new skin breakdown  Description: 1. Monitor for areas of redness and/or skin breakdown  2. Assess vascular access sites hourly  3. Every 4-6 hours minimum:  Change oxygen saturation probe site  4. Every 4-6 hours:  If on nasal continuous positive airway pressure, respiratory therapy assess nares and determine need for appliance change or resting period.   8/8/2023 1557 by Bennie Reeves RN  Outcome: Progressing     Problem: Pain  Goal: Verbalizes/displays adequate comfort level or baseline comfort level  Outcome: Progressing     Problem: Nutrition Deficit:  Goal: Optimize nutritional status  Outcome: Progressing

## 2023-08-08 NOTE — PLAN OF CARE
Problem: Discharge Planning  Goal: Discharge to home or other facility with appropriate resources  8/8/2023 0551 by Trevor Hickman RN  Outcome: Progressing  8/8/2023 0550 by Trevor Hickman RN  Outcome: Progressing     Problem: Safety - Adult  Goal: Free from fall injury  8/8/2023 0551 by Trevor Hickman RN  Outcome: Progressing  8/8/2023 0550 by Trevor Hickman RN  Outcome: Progressing     Problem: Skin/Tissue Integrity  Goal: Absence of new skin breakdown  Description: 1. Monitor for areas of redness and/or skin breakdown  2. Assess vascular access sites hourly  3. Every 4-6 hours minimum:  Change oxygen saturation probe site  4. Every 4-6 hours:  If on nasal continuous positive airway pressure, respiratory therapy assess nares and determine need for appliance change or resting period.   8/8/2023 0551 by Trevor Hickman RN  Outcome: Progressing  8/8/2023 0550 by Trevor Hickman RN  Outcome: Progressing

## 2023-08-08 NOTE — ANESTHESIA PRE PROCEDURE
Provider, MD   finasteride (PROSCAR) 5 MG tablet Take 1 tablet by mouth daily 1/24/23   DELORIS Cole   gabapentin (NEURONTIN) 100 MG capsule Take 2 capsules by mouth 3 times daily. 2 caps TI    Historical Provider, MD   ipratropium (ATROVENT) 0.03 % nasal spray 2 sprays by Each Nostril route every 6 hours as needed for Rhinitis    Historical Provider, MD   ketotifen (ZADITOR) 0.025 % ophthalmic solution Place 1 drop into both eyes 2 times daily    Historical Provider, MD   mometasone (ASMANEX) 220 MCG/ACT AEPB inhaler Inhale 1 puff into the lungs at bedtime Rinse mouth after use    Historical Provider, MD   polyethylene glycol (GLYCOLAX) 17 g packet Take 0.015 g by mouth daily (Mix 1 tablespoonful in 4-8 ounces of liquid)    Historical Provider, MD   rosuvastatin (CRESTOR) 40 MG tablet Take 0.5 tablets by mouth every evening    Historical Provider, MD   selenium sulfide (SELSUN) 2.5 % lotion Apply topically three times a week Shampoo, use 3 x week , leave on for 5 minutes then rinse    Historical Provider, MD   Ubrogepant 100 MG TABS One tab by mouth at onset of migraine,do not exceed 200mg/24 hours. Ok to repeat in 2 hours if headache continues.     Historical Provider, MD   pantoprazole (PROTONIX) 40 MG tablet Take 1 tablet by mouth every morning (before breakfast)    Historical Provider, MD   escitalopram (LEXAPRO) 20 MG tablet Take 1 tablet by mouth daily    Historical Provider, MD   aspirin 81 MG chewable tablet Take 1 tablet by mouth daily 7/14/19   Obdulia Alvarez MD   carvedilol (COREG) 12.5 MG tablet Take 1 tablet by mouth 2 times daily    Historical Provider, MD   lisinopril (PRINIVIL;ZESTRIL) 20 MG tablet Take 0.5 tablets by mouth daily    Historical Provider, MD   latanoprost (XALATAN) 0.005 % ophthalmic solution Place 1 drop into both eyes nightly    Historical Provider, MD       Current medications:    Current Facility-Administered Medications   Medication Dose Route Frequency Provider Last Rate

## 2023-08-08 NOTE — ED NOTES
Spoke with RN at [facility] [place], [place] regarding patient admission status/ reason for admission. No additional questions at this time.      Bladimir Connelly RN  08/07/23 5785

## 2023-08-08 NOTE — H&P
Gastroenterology Note             Pre-operative History and Physical    Patient: Shweta De Souza  : 1940  CSN: [id number]    History Obtained From:  patient and/or guardian. HISTORY OF PRESENT ILLNESS:    The patient is a 79 y.o. male  here for melena, acute drop in Hgb. Past Medical History:    Past Medical History:   Diagnosis Date    Asthma     CAD (coronary artery disease)     CKD (chronic kidney disease), stage III (HCC)     HTN (hypertension) 2019    Hyperlipidemia     Idiopathic normal pressure hydrocephalus (HCC)     Prostate cancer ([address])      Past Surgical History:    Past Surgical History:   Procedure Laterality Date    BUNIONECTOMY      CARDIAC SURGERY      coronary stents x2    EYE SURGERY      Retinal reattachment - bilateral    HUMERUS FRACTURE SURGERY Left 2023    INTRAMEDULLARY NAILING OF LEFT PROXIMAL HUMERUS FRACTURE      MCKEON & NEPHEW performed by Milo Meza MD at [address] [facility] Right 3/23/2023    RIGHT HUMERUS INTRAMEDULLARY NAILING performed by Milo Meza MD at [address] [facility]      Bilateral knees    KNEE SURGERY       Medications Prior to Admission:   No current facility-administered medications on file prior to encounter.      Current Outpatient Medications on File Prior to Encounter   Medication Sig Dispense Refill    atorvastatin (LIPITOR) 80 MG tablet 1 tablet at bedtime Orally Once a day      brimonidine (ALPHAGAN P) 0.15 % ophthalmic solution Place 1 drop into both eyes in the morning and at bedtime      buPROPion (WELLBUTRIN SR) 100 MG extended release tablet Take 100 mg by mouth daily (Patient not taking: Reported on 2023)      albuterol sulfate HFA (PROVENTIL;VENTOLIN;PROAIR) 108 (90 Base) MCG/ACT inhaler Inhale 2 puffs into the lungs every 4 hours as needed for Shortness of Breath      vitamin D 25 MCG (1000 UT) CAPS Take 1 capsule by mouth daily      diclofenac sodium (VOLTAREN) 1 % GEL Apply 4 g topically in
Place 1 drop into both eyes 2 times daily    Historical Provider, MD   fluticasone (FLONASE) 50 MCG/ACT nasal spray 2 sprays by Each Nostril route daily    Historical Provider, MD   polyethyl glycol-propyl glycol 0.4-0.3 % (SYSTANE) 0.4-0.3 % ophthalmic solution Place 1-2 drops into both eyes as needed for Dry Eyes  Patient not taking: Reported on 8/8/2023    Historical Provider, MD   lidocaine (LIDODERM) 5 % Place 1 patch onto the skin daily 12 hours on, 12 hours off. For right rib pain    Historical Provider, MD   methocarbamol (ROBAXIN) 500 MG tablet Take 1 tablet by mouth 2 times daily as needed (muscle spasms)    Historical Provider, MD   finasteride (PROSCAR) 5 MG tablet Take 1 tablet by mouth daily 1/24/23   DELORIS Jefferson   gabapentin (NEURONTIN) 100 MG capsule Take 2 capsules by mouth 3 times daily. 2 caps TI    Historical Provider, MD   ipratropium (ATROVENT) 0.03 % nasal spray 2 sprays by Each Nostril route every 6 hours as needed for Rhinitis    Historical Provider, MD   ketotifen (ZADITOR) 0.025 % ophthalmic solution Place 1 drop into both eyes 2 times daily    Historical Provider, MD   mometasone (ASMANEX) 220 MCG/ACT AEPB inhaler Inhale 1 puff into the lungs at bedtime Rinse mouth after use    Historical Provider, MD   polyethylene glycol (GLYCOLAX) 17 g packet Take 0.015 g by mouth daily (Mix 1 tablespoonful in 4-8 ounces of liquid)    Historical Provider, MD   rosuvastatin (CRESTOR) 40 MG tablet Take 0.5 tablets by mouth every evening    Historical Provider, MD   selenium sulfide (SELSUN) 2.5 % lotion Apply topically three times a week Shampoo, use 3 x week , leave on for 5 minutes then rinse    Historical Provider, MD   Ubrogepant 100 MG TABS One tab by mouth at onset of migraine,do not exceed 200mg/24 hours. Ok to repeat in 2 hours if headache continues.     Historical Provider, MD   pantoprazole (PROTONIX) 40 MG tablet Take 1 tablet by mouth every morning (before breakfast)    Historical

## 2023-08-08 NOTE — ED NOTES
Called wife, Abelardo Hernández and updated wife on patient admission status and bed assignment.       Nicole Martinez, PATTI  08/07/23 9622

## 2023-08-08 NOTE — ANESTHESIA POSTPROCEDURE EVALUATION
Department of Anesthesiology  Postprocedure Note    Patient: Frandy Eaton  MRN: [id number]  YOB: 1940  Date of evaluation: 8/8/2023      Procedure Summary     Date: 08/08/23 Room / Location: [facility]ares / [address] [place] [address]    Anesthesia Start: 1548 Anesthesia Stop: 2064    Procedure: EGD BIOPSY Diagnosis:       Melena      (Melena [K92.1])    Surgeons: Betina Gilliland MD Responsible Provider: Lenore CrewDO    Anesthesia Type: general ASA Status: 3          Anesthesia Type: No value filed.     Daly Phase I: Daly Score: 10    Daly Phase II: Daly Score: 10      Anesthesia Post Evaluation    Patient location during evaluation: PACU  Patient participation: complete - patient participated  Level of consciousness: awake and alert  Pain score: 0  Airway patency: patent  Nausea & Vomiting: no nausea and no vomiting  Complications: no  Cardiovascular status: blood pressure returned to baseline and hemodynamically stable  Respiratory status: acceptable and room air  Hydration status: euvolemic  Pain management: adequate

## 2023-08-09 VITALS
WEIGHT: 156 LBS | HEART RATE: 60 BPM | HEIGHT: 69 IN | BODY MASS INDEX: 23.11 KG/M2 | DIASTOLIC BLOOD PRESSURE: 68 MMHG | SYSTOLIC BLOOD PRESSURE: 120 MMHG | RESPIRATION RATE: 16 BRPM | OXYGEN SATURATION: 98 % | TEMPERATURE: 97.3 F

## 2023-08-09 LAB
ANION GAP SERPL CALCULATED.3IONS-SCNC: 12 MMOL/L (ref 3–16)
BUN SERPL-MCNC: 34 MG/DL (ref 7–20)
CALCIUM SERPL-MCNC: 8.8 MG/DL (ref 8.3–10.6)
CHLORIDE SERPL-SCNC: 112 MMOL/L (ref 99–110)
CO2 SERPL-SCNC: 16 MMOL/L (ref 21–32)
CREAT SERPL-MCNC: 1.4 MG/DL (ref 0.8–1.3)
GFR SERPLBLD CREATININE-BSD FMLA CKD-EPI: 50 ML/MIN/{1.73_M2}
GLUCOSE SERPL-MCNC: 86 MG/DL (ref 70–99)
HCT VFR BLD AUTO: 23.3 % (ref 40.5–52.5)
HCT VFR BLD AUTO: 23.4 % (ref 40.5–52.5)
HCT VFR BLD AUTO: 26.7 % (ref 40.5–52.5)
HGB BLD-MCNC: 8 G/DL (ref 13.5–17.5)
HGB BLD-MCNC: 8.1 G/DL (ref 13.5–17.5)
HGB BLD-MCNC: 8.9 G/DL (ref 13.5–17.5)
POTASSIUM SERPL-SCNC: 3.8 MMOL/L (ref 3.5–5.1)
SODIUM SERPL-SCNC: 140 MMOL/L (ref 136–145)

## 2023-08-09 PROCEDURE — 85014 HEMATOCRIT: CPT

## 2023-08-09 PROCEDURE — 2580000003 HC RX 258: Performed by: INTERNAL MEDICINE

## 2023-08-09 PROCEDURE — 6370000000 HC RX 637 (ALT 250 FOR IP)

## 2023-08-09 PROCEDURE — 6370000000 HC RX 637 (ALT 250 FOR IP): Performed by: INTERNAL MEDICINE

## 2023-08-09 PROCEDURE — 36415 COLL VENOUS BLD VENIPUNCTURE: CPT

## 2023-08-09 PROCEDURE — 85018 HEMOGLOBIN: CPT

## 2023-08-09 PROCEDURE — 80048 BASIC METABOLIC PNL TOTAL CA: CPT

## 2023-08-09 RX ORDER — ASPIRIN 81 MG/1
81 TABLET, CHEWABLE ORAL DAILY
Status: DISCONTINUED | OUTPATIENT
Start: 2023-08-09 | End: 2023-08-09 | Stop reason: HOSPADM

## 2023-08-09 RX ORDER — LISINOPRIL 10 MG/1
10 TABLET ORAL DAILY
Status: DISCONTINUED | OUTPATIENT
Start: 2023-08-09 | End: 2023-08-09 | Stop reason: HOSPADM

## 2023-08-09 RX ORDER — PANTOPRAZOLE SODIUM 40 MG/1
40 TABLET, DELAYED RELEASE ORAL
Qty: 60 TABLET | Refills: 0 | Status: SHIPPED | OUTPATIENT
Start: 2023-08-09

## 2023-08-09 RX ORDER — CARVEDILOL 6.25 MG/1
12.5 TABLET ORAL 2 TIMES DAILY
Status: DISCONTINUED | OUTPATIENT
Start: 2023-08-09 | End: 2023-08-09 | Stop reason: HOSPADM

## 2023-08-09 RX ORDER — ATORVASTATIN CALCIUM 80 MG/1
80 TABLET, FILM COATED ORAL NIGHTLY
Status: DISCONTINUED | OUTPATIENT
Start: 2023-08-09 | End: 2023-08-09 | Stop reason: HOSPADM

## 2023-08-09 RX ADMIN — Medication 1000 UNITS: at 08:17

## 2023-08-09 RX ADMIN — PANTOPRAZOLE SODIUM 40 MG: 40 TABLET, DELAYED RELEASE ORAL at 16:51

## 2023-08-09 RX ADMIN — FLUTICASONE PROPIONATE 2 SPRAY: 50 SPRAY, METERED NASAL at 08:18

## 2023-08-09 RX ADMIN — PANTOPRAZOLE SODIUM 40 MG: 40 TABLET, DELAYED RELEASE ORAL at 08:18

## 2023-08-09 RX ADMIN — CARVEDILOL 12.5 MG: 6.25 TABLET, FILM COATED ORAL at 13:31

## 2023-08-09 RX ADMIN — FINASTERIDE 5 MG: 5 TABLET, FILM COATED ORAL at 08:18

## 2023-08-09 RX ADMIN — LISINOPRIL 10 MG: 10 TABLET ORAL at 13:31

## 2023-08-09 RX ADMIN — KETOTIFEN FUMARATE 1 DROP: 0.25 SOLUTION/ DROPS OPHTHALMIC at 08:19

## 2023-08-09 RX ADMIN — Medication 10 ML: at 08:18

## 2023-08-09 RX ADMIN — ESCITALOPRAM 20 MG: 10 TABLET, FILM COATED ORAL at 08:17

## 2023-08-09 RX ADMIN — BRIMONIDINE TARTRATE 1 DROP: 2 SOLUTION OPHTHALMIC at 08:19

## 2023-08-09 RX ADMIN — DORZOLAMIDE HYDROCHLORIDE AND TIMOLOL MALEATE 1 DROP: 20; 5 SOLUTION/ DROPS OPHTHALMIC at 08:18

## 2023-08-09 RX ADMIN — BUPROPION HYDROCHLORIDE 100 MG: 100 TABLET, EXTENDED RELEASE ORAL at 08:22

## 2023-08-09 RX ADMIN — ASPIRIN 81 MG 81 MG: 81 TABLET ORAL at 13:31

## 2023-08-09 RX ADMIN — GABAPENTIN 200 MG: 100 CAPSULE ORAL at 08:17

## 2023-08-09 RX ADMIN — GABAPENTIN 200 MG: 100 CAPSULE ORAL at 13:31

## 2023-08-09 ASSESSMENT — PAIN SCALES - GENERAL: PAINLEVEL_OUTOF10: 0

## 2023-08-09 NOTE — DISCHARGE INSTR - COC
Continuity of Care Form    Patient Name: Shruti Esposito   :  1940  MRN:  [id number]    Admit date:  2023  Discharge date:  23    Code Status Order: Full Code   Advance Directives:     Admitting Physician:  Meena Juárez MD  PCP: Madelaine Garcia MD    Discharging Nurse:Ana OBRIEN, [facility] Unit/Room#: 0204/0204-01  Discharging Unit Phone Number: [phone]    Emergency Contact:   Extended Emergency Contact Information  Primary Emergency Contact: Radha Bedoya  Address: [address] [place] [address] [facility] [address] [address] Phone: [phone]  Work Phone: [phone]  Mobile Phone: [phone]  Relation: Spouse  Secondary Emergency Contact: [address] [place] [address] Phone: [phone]  Mobile Phone: [phone]  Relation: Child    Past Surgical History:  Past Surgical History:   Procedure Laterality Date    BUNIONECTOMY      CARDIAC SURGERY      coronary stents x2    EYE SURGERY      Retinal reattachment - bilateral    HUMERUS FRACTURE SURGERY Left 2023    INTRAMEDULLARY NAILING OF LEFT PROXIMAL HUMERUS FRACTURE      MCKEON & NEPHEW performed by Nella Barba MD at [address] [facility] Right 3/23/2023    RIGHT HUMERUS INTRAMEDULLARY NAILING performed by Nella Barba MD at [address]      Bilateral knees    KNEE SURGERY      UPPER GASTROINTESTINAL ENDOSCOPY N/A 2023    EGD BIOPSY performed by Conchita Quintana MD at [address] [facility] ENDOSCOPY       Immunization History:   Immunization History   Administered Date(s) Administered    TDaP, ADACEL (age 6y-58y), [facility] (age 10y+), IM, 0.5mL 2019       Active Problems:  Patient Active Problem List   Diagnosis Code    Chest pain R07.9    HTN (hypertension) I10    CAD (coronary artery disease) I25.10    Headache R51.9    Post-operative pain G89.18    NSTEMI (non-ST elevated myocardial infarction) ([address]) I21.4    Tachycardia R00.0    History of humerus fracture Z87.81

## 2023-08-09 NOTE — ACP (ADVANCE CARE PLANNING)
Advance Care Planning     Advance Care Planning Clinical Specialist  Conversation Note      Date of ACP Conversation: 8/9/2023    Conversation Conducted with: patient and wife     ACP Clinical Specialist: Bruna Castano RN      Healthcare Decision Maker:     Current Designated Healthcare Decision Maker:     Primary Decision Maker: Danii Atkinson - Spouse - [phone]    Secondary Decision Maker: Sandy Mejia Child - [phone]        Care Preferences    Hospitalization: \"If your health worsens and it becomes clear that your chance of recovery is unlikely, what would your preference be regarding hospitalization? \"    Choice:  [] The patient wants hospitalization. [] The patient prefers comfort-focused treatment without hospitalization. Ventilation: \"If you were in your present state of health and suddenly became very ill and were unable to breathe on your own, what would your preference be about the use of a ventilator (breathing machine) if it were available to you? \"      If the patient would desire the use of ventilator (breathing machine), answer \"yes\". If not, \"no\": yes          Resuscitation  \"CPR works best to restart the heart when there is a sudden event, like a heart attack, in someone who is otherwise healthy. Unfortunately, CPR does not typically restart the heart for people who have serious health conditions or who are very sick. \"    \"In the event your heart stopped as a result of an underlying serious health condition, would you want attempts to be made to restart your heart (answer \"yes\" for attempt to resuscitate) or would you prefer a natural death (answer \"no\" for do not attempt to resuscitate)? \" yes       [x] Yes   [] No   Educated Patient / [facility] regarding differences between Advance Directives and portable DNR orders.     Length of ACP Conversation in minutes:  5    Conversation Outcomes:  ACP discussion completed    Follow-up plan:    [] Schedule follow-up conversation to

## 2023-08-09 NOTE — CARE COORDINATION
CASE MANAGEMENT DISCHARGE SUMMARY      Discharge to: [facility]     Precertification completed: [facility] Exemption Notification (HENS) completed: n/a    Transportation: ambulance    Medical Transport explained to pt/family. Pt/family voice no agency preference. Agency used:[facility]    time:5pm   Ambulance form completed: Yes    Confirmed discharge plan with:patient/wife/RN/Pj with [facility]     Patient: yes     Family:  yes    Name:Pavithra  Contact number:[phone]     Facility/Agency, name:  [facility] faxed   Phone number for report to facility: [phone]     RN, name: Kurt Masse     Note: Discharging nurse to complete [facility], reconcile [facility], and place final copy with patient's discharge packet. RN to ensure that written prescriptions for  Level II medications are sent with patient to the facility as per protocol.

## 2023-08-09 NOTE — PLAN OF CARE
Problem: Discharge Planning  Goal: Discharge to home or other facility with appropriate resources  8/8/2023 2302 by Lloyd Bravo RN  Outcome: Progressing  8/8/2023 1557 by Jung Ulrich RN  Outcome: Progressing     Problem: Safety - Adult  Goal: Free from fall injury  8/8/2023 2302 by Lloyd Bravo RN  Outcome: Progressing  8/8/2023 1557 by Jung Ulrich RN  Outcome: Progressing     Problem: Skin/Tissue Integrity  Goal: Absence of new skin breakdown  Description: 1. Monitor for areas of redness and/or skin breakdown  2. Assess vascular access sites hourly  3. Every 4-6 hours minimum:  Change oxygen saturation probe site  4. Every 4-6 hours:  If on nasal continuous positive airway pressure, respiratory therapy assess nares and determine need for appliance change or resting period.   8/8/2023 2302 by Lloyd Bravo RN  Outcome: Progressing  8/8/2023 1557 by Jung Ulrich RN  Outcome: Progressing     Problem: Pain  Goal: Verbalizes/displays adequate comfort level or baseline comfort level  8/8/2023 2302 by Lloyd Bravo RN  Outcome: Progressing  8/8/2023 1557 by Jung Ulrich RN  Outcome: Progressing     Problem: Nutrition Deficit:  Goal: Optimize nutritional status  8/8/2023 2302 by Lloyd Barvo RN  Outcome: Progressing  8/8/2023 1557 by Jung Ulrich RN  Outcome: Progressing

## 2023-08-09 NOTE — CARE COORDINATION
Case Management Assessment  Initial Evaluation    Date/Time of Evaluation: 8/9/2023 9:57 AM  Assessment Completed by: Bruna Castano RN    If patient is discharged prior to next notation, then this note serves as note for discharge by case management. Patient Name: Vivian Lazo                   YOB: 1940  Diagnosis: GI bleed [K92.2]  Hypotension, unspecified hypotension type [I95.9]  Gastrointestinal hemorrhage, unspecified gastrointestinal hemorrhage type [K92.2]                   Date / Time: 8/7/2023  3:48 PM    Patient Admission Status: Inpatient   Readmission Risk (Low < 19, Mod (19-27), High > 27): Readmission Risk Score: 20.8    Current PCP: Staci Rodriguez MD  PCP verified by CM? Yes    Chart Reviewed: Yes      History Provided by: Patient, Spouse  Patient Orientation: Alert and Oriented, Person, Place, Situation, Self    Patient Cognition: Alert    Hospitalization in the last 30 days (Readmission):  No    If yes, Readmission Assessment in  Navigator will be completed. Advance Directives:      Code Status: Full Code   Patient's Primary Decision Maker is: Legal Next of Kin    Primary Decision Maker: [address] [facility] [phone]    Secondary Decision Maker: [facility] [phone]    Discharge Planning:    Patient lives with: Alone Type of Home: Long-Term Care  Primary Care Giver:  Other (Comment) (staff at [facility])  Patient Support Systems include: Spouse/Significant Other, Children   Current Financial resources: [employer] ([place]), Medicare  Current community resources: ECF/Home Care  Current services prior to admission: Extended Care Facility            Current DME:              Type of Home Care services:  None    ADLS  Prior functional level: Assistance with the following:, Bathing, Dressing, Toileting  Current functional level: Assistance with the following:, Bathing, Dressing, Toileting    PT AM-PAC:   /24  OT AM-PAC:   /24    Family can provide assistance at DC:

## 2023-08-09 NOTE — PLAN OF CARE
VSS. NSR on monitor. Patient is alert and oriented, denies any pain. Patient on regular diet, tolerating okay, remains with a fair appetite. Turns self and assisted with turns for pressure ulcer prevention. Free from any injury. Patient and family updated on plan of care. Will continue plan of care. Okay to discharge back to [facility]. Plan for  today at 1700. Problem: Discharge Planning  Goal: Discharge to home or other facility with appropriate resources  Outcome: Adequate for Discharge  Flowsheets (Taken 8/9/2023 7259)  Discharge to home or other facility with appropriate resources:   Identify barriers to discharge with patient and caregiver   Arrange for needed discharge resources and transportation as appropriate   Identify discharge learning needs (meds, wound care, etc)     Problem: Safety - Adult  Goal: Free from fall injury  Outcome: Adequate for Discharge     Problem: Skin/Tissue Integrity  Goal: Absence of new skin breakdown  Description: 1. Monitor for areas of redness and/or skin breakdown  2. Assess vascular access sites hourly  3. Every 4-6 hours minimum:  Change oxygen saturation probe site  4. Every 4-6 hours:  If on nasal continuous positive airway pressure, respiratory therapy assess nares and determine need for appliance change or resting period.   Outcome: Adequate for Discharge     Problem: Pain  Goal: Verbalizes/displays adequate comfort level or baseline comfort level  Outcome: Adequate for Discharge     Problem: Nutrition Deficit:  Goal: Optimize nutritional status  Outcome: Adequate for Discharge

## 2023-08-10 NOTE — DISCHARGE SUMMARY
pancreatitis. 3. Large hiatal hernia. 4. Nonobstructive left renal calculi. Consults:     IP CONSULT TO GI  IP CONSULT TO HOSPITALIST    Labs:     Recent Labs     08/07/23  1553 08/07/23  2041 08/09/23  0257 08/09/23  0845 08/09/23  1535   WBC 14.3*  --   --   --   --    HGB 8.6*   < > 8.1* 8.9* 8.0*   HCT 25.7*   < > 23.4* 26.7* 23.3*     --   --   --   --     < > = values in this interval not displayed. Recent Labs     08/07/23  1553 08/08/23  0338 08/09/23  0845   * 137 140   K 4.0 3.4* 3.8    108 112*   CO2 16* 16* 16*   BUN 79* 64* 34*   CREATININE 1.8* 1.7* 1.4*   CALCIUM 9.4 8.6 8.8   MG  --  1.80  --      Recent Labs     08/07/23  1930 08/07/23  2354 08/08/23  0338   TROPHS 51* 58* 63*     No results for input(s): LABA1C in the last 72 hours.   Recent Labs     08/07/23  1553   AST 12*   ALT 7*   BILITOT 0.3   ALKPHOS 70     Recent Labs     08/07/23  1553 08/07/23  1930 08/08/23  0338   INR 1.24*  --  1.28*   LACTA 2.1* 1.8  --        Urine Cultures: No results found for: LABURIN  Blood Cultures: No results found for: BC  No results found for: BLOODCULT2  Organism: No results found for: ORG    Signed:    Yesenia Burns, APRN - CNP

## 2023-08-11 LAB
BLOOD BANK DISPENSE STATUS: NORMAL
BLOOD BANK DISPENSE STATUS: NORMAL
BLOOD BANK PRODUCT CODE: NORMAL
BLOOD BANK PRODUCT CODE: NORMAL
BPU ID: NORMAL
BPU ID: NORMAL
DESCRIPTION BLOOD BANK: NORMAL
DESCRIPTION BLOOD BANK: NORMAL

## 2023-08-14 LAB
EKG ATRIAL RATE: 70 BPM
EKG DIAGNOSIS: NORMAL
EKG P AXIS: 53 DEGREES
EKG P-R INTERVAL: 216 MS
EKG Q-T INTERVAL: 410 MS
EKG QRS DURATION: 92 MS
EKG QTC CALCULATION (BAZETT): 442 MS
EKG R AXIS: 8 DEGREES
EKG T AXIS: -37 DEGREES
EKG VENTRICULAR RATE: 70 BPM

## 2023-11-27 ENCOUNTER — APPOINTMENT (OUTPATIENT)
Dept: GENERAL RADIOLOGY | Age: 83
DRG: 280 | End: 2023-11-27
Payer: OTHER GOVERNMENT

## 2023-11-27 ENCOUNTER — APPOINTMENT (OUTPATIENT)
Dept: CT IMAGING | Age: 83
DRG: 280 | End: 2023-11-27
Payer: OTHER GOVERNMENT

## 2023-11-27 ENCOUNTER — HOSPITAL ENCOUNTER (INPATIENT)
Age: 83
LOS: 3 days | Discharge: NURSING, REHAB OR LONG-TERM CARE | DRG: 280 | End: 2023-11-30
Attending: EMERGENCY MEDICINE | Admitting: STUDENT IN AN ORGANIZED HEALTH CARE EDUCATION/TRAINING PROGRAM
Payer: OTHER GOVERNMENT

## 2023-11-27 DIAGNOSIS — D64.9 ACUTE ON CHRONIC ANEMIA: ICD-10-CM

## 2023-11-27 DIAGNOSIS — N18.9 CHRONIC KIDNEY DISEASE, UNSPECIFIED CKD STAGE: ICD-10-CM

## 2023-11-27 DIAGNOSIS — I21.4 NSTEMI (NON-ST ELEVATED MYOCARDIAL INFARCTION) (HCC): Primary | ICD-10-CM

## 2023-11-27 DIAGNOSIS — J96.20 ACUTE ON CHRONIC RESPIRATORY FAILURE, UNSPECIFIED WHETHER WITH HYPOXIA OR HYPERCAPNIA (HCC): ICD-10-CM

## 2023-11-27 LAB
ALBUMIN SERPL-MCNC: 3.3 G/DL (ref 3.4–5)
ALBUMIN/GLOB SERPL: 1 {RATIO} (ref 1.1–2.2)
ALP SERPL-CCNC: 175 U/L (ref 40–129)
ALT SERPL-CCNC: 17 U/L (ref 10–40)
ANION GAP SERPL CALCULATED.3IONS-SCNC: 11 MMOL/L (ref 3–16)
ANTI-XA UNFRAC HEPARIN: 0.37 IU/ML (ref 0.3–0.7)
APTT BLD: 120.9 SEC (ref 22.7–35.9)
AST SERPL-CCNC: 15 U/L (ref 15–37)
BASOPHILS # BLD: 0.1 K/UL (ref 0–0.2)
BASOPHILS NFR BLD: 1.2 %
BILIRUB SERPL-MCNC: 0.5 MG/DL (ref 0–1)
BILIRUB UR QL STRIP.AUTO: NEGATIVE
BUN SERPL-MCNC: 22 MG/DL (ref 7–20)
CALCIUM SERPL-MCNC: 8.6 MG/DL (ref 8.3–10.6)
CHLORIDE SERPL-SCNC: 102 MMOL/L (ref 99–110)
CLARITY UR: CLEAR
CO2 SERPL-SCNC: 22 MMOL/L (ref 21–32)
COLOR UR: YELLOW
CREAT SERPL-MCNC: 1.5 MG/DL (ref 0.8–1.3)
DEPRECATED RDW RBC AUTO: 18.8 % (ref 12.4–15.4)
EOSINOPHIL # BLD: 0.3 K/UL (ref 0–0.6)
EOSINOPHIL NFR BLD: 5 %
FLUAV RNA RESP QL NAA+PROBE: NOT DETECTED
FLUBV RNA RESP QL NAA+PROBE: NOT DETECTED
GFR SERPLBLD CREATININE-BSD FMLA CKD-EPI: 46 ML/MIN/{1.73_M2}
GLUCOSE SERPL-MCNC: 119 MG/DL (ref 70–99)
GLUCOSE UR STRIP.AUTO-MCNC: NEGATIVE MG/DL
HCT VFR BLD AUTO: 29.1 % (ref 40.5–52.5)
HGB BLD-MCNC: 9.6 G/DL (ref 13.5–17.5)
HGB UR QL STRIP.AUTO: NEGATIVE
KETONES UR STRIP.AUTO-MCNC: NEGATIVE MG/DL
LEUKOCYTE ESTERASE UR QL STRIP.AUTO: NEGATIVE
LYMPHOCYTES # BLD: 0.9 K/UL (ref 1–5.1)
LYMPHOCYTES NFR BLD: 14.9 %
MCH RBC QN AUTO: 32 PG (ref 26–34)
MCHC RBC AUTO-ENTMCNC: 32.9 G/DL (ref 31–36)
MCV RBC AUTO: 97.4 FL (ref 80–100)
MONOCYTES # BLD: 0.4 K/UL (ref 0–1.3)
MONOCYTES NFR BLD: 6.3 %
NEUTROPHILS # BLD: 4.4 K/UL (ref 1.7–7.7)
NEUTROPHILS NFR BLD: 72.6 %
NITRITE UR QL STRIP.AUTO: NEGATIVE
NT-PROBNP SERPL-MCNC: 5096 PG/ML (ref 0–449)
PH UR STRIP.AUTO: 7 [PH] (ref 5–8)
PLATELET # BLD AUTO: 285 K/UL (ref 135–450)
PMV BLD AUTO: 7.8 FL (ref 5–10.5)
POTASSIUM SERPL-SCNC: 4 MMOL/L (ref 3.5–5.1)
PROCALCITONIN SERPL IA-MCNC: 0.06 NG/ML (ref 0–0.15)
PROT SERPL-MCNC: 6.6 G/DL (ref 6.4–8.2)
PROT UR STRIP.AUTO-MCNC: NEGATIVE MG/DL
RBC # BLD AUTO: 2.99 M/UL (ref 4.2–5.9)
SARS-COV-2 RNA RESP QL NAA+PROBE: NOT DETECTED
SODIUM SERPL-SCNC: 135 MMOL/L (ref 136–145)
SP GR UR STRIP.AUTO: 1.01 (ref 1–1.03)
TROPONIN, HIGH SENSITIVITY: 434 NG/L (ref 0–22)
TROPONIN, HIGH SENSITIVITY: 519 NG/L (ref 0–22)
UA COMPLETE W REFLEX CULTURE PNL UR: NORMAL
UA DIPSTICK W REFLEX MICRO PNL UR: NORMAL
URN SPEC COLLECT METH UR: NORMAL
UROBILINOGEN UR STRIP-ACNC: 0.2 E.U./DL
WBC # BLD AUTO: 6.1 K/UL (ref 4–11)

## 2023-11-27 PROCEDURE — 82607 VITAMIN B-12: CPT

## 2023-11-27 PROCEDURE — 80053 COMPREHEN METABOLIC PANEL: CPT

## 2023-11-27 PROCEDURE — 6370000000 HC RX 637 (ALT 250 FOR IP): Performed by: PHYSICIAN ASSISTANT

## 2023-11-27 PROCEDURE — 6370000000 HC RX 637 (ALT 250 FOR IP): Performed by: STUDENT IN AN ORGANIZED HEALTH CARE EDUCATION/TRAINING PROGRAM

## 2023-11-27 PROCEDURE — 94640 AIRWAY INHALATION TREATMENT: CPT

## 2023-11-27 PROCEDURE — 6360000002 HC RX W HCPCS: Performed by: INTERNAL MEDICINE

## 2023-11-27 PROCEDURE — 85025 COMPLETE CBC W/AUTO DIFF WBC: CPT

## 2023-11-27 PROCEDURE — 96374 THER/PROPH/DIAG INJ IV PUSH: CPT

## 2023-11-27 PROCEDURE — 99291 CRITICAL CARE FIRST HOUR: CPT

## 2023-11-27 PROCEDURE — 85730 THROMBOPLASTIN TIME PARTIAL: CPT

## 2023-11-27 PROCEDURE — 96361 HYDRATE IV INFUSION ADD-ON: CPT

## 2023-11-27 PROCEDURE — 2700000000 HC OXYGEN THERAPY PER DAY

## 2023-11-27 PROCEDURE — 81003 URINALYSIS AUTO W/O SCOPE: CPT

## 2023-11-27 PROCEDURE — 2060000000 HC ICU INTERMEDIATE R&B

## 2023-11-27 PROCEDURE — 83880 ASSAY OF NATRIURETIC PEPTIDE: CPT

## 2023-11-27 PROCEDURE — 87636 SARSCOV2 & INF A&B AMP PRB: CPT

## 2023-11-27 PROCEDURE — 70450 CT HEAD/BRAIN W/O DYE: CPT

## 2023-11-27 PROCEDURE — 93005 ELECTROCARDIOGRAM TRACING: CPT | Performed by: PHYSICIAN ASSISTANT

## 2023-11-27 PROCEDURE — 82746 ASSAY OF FOLIC ACID SERUM: CPT

## 2023-11-27 PROCEDURE — 6360000002 HC RX W HCPCS: Performed by: EMERGENCY MEDICINE

## 2023-11-27 PROCEDURE — 36415 COLL VENOUS BLD VENIPUNCTURE: CPT

## 2023-11-27 PROCEDURE — 2580000003 HC RX 258: Performed by: PHYSICIAN ASSISTANT

## 2023-11-27 PROCEDURE — 85520 HEPARIN ASSAY: CPT

## 2023-11-27 PROCEDURE — 6360000004 HC RX CONTRAST MEDICATION: Performed by: PHYSICIAN ASSISTANT

## 2023-11-27 PROCEDURE — 84145 PROCALCITONIN (PCT): CPT

## 2023-11-27 PROCEDURE — 6360000002 HC RX W HCPCS: Performed by: PHYSICIAN ASSISTANT

## 2023-11-27 PROCEDURE — 99285 EMERGENCY DEPT VISIT HI MDM: CPT

## 2023-11-27 PROCEDURE — 94761 N-INVAS EAR/PLS OXIMETRY MLT: CPT

## 2023-11-27 PROCEDURE — 93005 ELECTROCARDIOGRAM TRACING: CPT | Performed by: EMERGENCY MEDICINE

## 2023-11-27 PROCEDURE — 71260 CT THORAX DX C+: CPT

## 2023-11-27 PROCEDURE — 71045 X-RAY EXAM CHEST 1 VIEW: CPT

## 2023-11-27 PROCEDURE — 84484 ASSAY OF TROPONIN QUANT: CPT

## 2023-11-27 RX ORDER — HEPARIN SODIUM 1000 [USP'U]/ML
2000 INJECTION, SOLUTION INTRAVENOUS; SUBCUTANEOUS PRN
Status: DISCONTINUED | OUTPATIENT
Start: 2023-11-27 | End: 2023-11-29

## 2023-11-27 RX ORDER — MAGNESIUM SULFATE IN WATER 40 MG/ML
2000 INJECTION, SOLUTION INTRAVENOUS PRN
Status: DISCONTINUED | OUTPATIENT
Start: 2023-11-27 | End: 2023-11-30 | Stop reason: HOSPADM

## 2023-11-27 RX ORDER — ACETAMINOPHEN 325 MG/1
650 TABLET ORAL EVERY 6 HOURS PRN
Status: DISCONTINUED | OUTPATIENT
Start: 2023-11-27 | End: 2023-11-30 | Stop reason: HOSPADM

## 2023-11-27 RX ORDER — HEPARIN SODIUM 10000 [USP'U]/100ML
5-30 INJECTION, SOLUTION INTRAVENOUS CONTINUOUS
Status: DISCONTINUED | OUTPATIENT
Start: 2023-11-27 | End: 2023-11-27 | Stop reason: SDUPTHER

## 2023-11-27 RX ORDER — BUPROPION HYDROCHLORIDE 100 MG/1
100 TABLET, EXTENDED RELEASE ORAL DAILY
Status: DISCONTINUED | OUTPATIENT
Start: 2023-11-27 | End: 2023-11-30 | Stop reason: HOSPADM

## 2023-11-27 RX ORDER — FUROSEMIDE 10 MG/ML
20 INJECTION INTRAMUSCULAR; INTRAVENOUS DAILY
Status: DISCONTINUED | OUTPATIENT
Start: 2023-11-27 | End: 2023-11-27

## 2023-11-27 RX ORDER — POTASSIUM CHLORIDE 20 MEQ/1
40 TABLET, EXTENDED RELEASE ORAL PRN
Status: DISCONTINUED | OUTPATIENT
Start: 2023-11-27 | End: 2023-11-30 | Stop reason: HOSPADM

## 2023-11-27 RX ORDER — HEPARIN SODIUM 10000 [USP'U]/100ML
850 INJECTION, SOLUTION INTRAVENOUS CONTINUOUS
Status: ACTIVE | OUTPATIENT
Start: 2023-11-27 | End: 2023-11-28

## 2023-11-27 RX ORDER — HEPARIN SODIUM 10000 [USP'U]/100ML
850 INJECTION, SOLUTION INTRAVENOUS CONTINUOUS
Status: DISCONTINUED | OUTPATIENT
Start: 2023-11-27 | End: 2023-11-27

## 2023-11-27 RX ORDER — ALBUTEROL SULFATE 90 UG/1
2 AEROSOL, METERED RESPIRATORY (INHALATION) EVERY 4 HOURS PRN
Status: DISCONTINUED | OUTPATIENT
Start: 2023-11-27 | End: 2023-11-30 | Stop reason: HOSPADM

## 2023-11-27 RX ORDER — HEPARIN SODIUM 1000 [USP'U]/ML
4000 INJECTION, SOLUTION INTRAVENOUS; SUBCUTANEOUS PRN
Status: DISCONTINUED | OUTPATIENT
Start: 2023-11-27 | End: 2023-11-29

## 2023-11-27 RX ORDER — HEPARIN SODIUM 1000 [USP'U]/ML
60 INJECTION, SOLUTION INTRAVENOUS; SUBCUTANEOUS ONCE
Status: DISCONTINUED | OUTPATIENT
Start: 2023-11-27 | End: 2023-11-27

## 2023-11-27 RX ORDER — POTASSIUM CHLORIDE 7.45 MG/ML
10 INJECTION INTRAVENOUS PRN
Status: DISCONTINUED | OUTPATIENT
Start: 2023-11-27 | End: 2023-11-30 | Stop reason: HOSPADM

## 2023-11-27 RX ORDER — ASPIRIN 81 MG/1
81 TABLET, CHEWABLE ORAL DAILY
Status: DISCONTINUED | OUTPATIENT
Start: 2023-11-28 | End: 2023-11-30 | Stop reason: HOSPADM

## 2023-11-27 RX ORDER — POLYETHYLENE GLYCOL 3350 17 G/17G
17 POWDER, FOR SOLUTION ORAL DAILY PRN
Status: DISCONTINUED | OUTPATIENT
Start: 2023-11-27 | End: 2023-11-30 | Stop reason: HOSPADM

## 2023-11-27 RX ORDER — ASPIRIN 325 MG
325 TABLET ORAL ONCE
Status: COMPLETED | OUTPATIENT
Start: 2023-11-27 | End: 2023-11-27

## 2023-11-27 RX ORDER — NITROGLYCERIN 20 MG/100ML
10 INJECTION INTRAVENOUS CONTINUOUS
Status: ACTIVE | OUTPATIENT
Start: 2023-11-27 | End: 2023-11-28

## 2023-11-27 RX ORDER — CLOPIDOGREL BISULFATE 75 MG/1
75 TABLET ORAL DAILY
Status: DISCONTINUED | OUTPATIENT
Start: 2023-11-27 | End: 2023-11-30 | Stop reason: HOSPADM

## 2023-11-27 RX ORDER — SODIUM CHLORIDE 0.9 % (FLUSH) 0.9 %
5-40 SYRINGE (ML) INJECTION PRN
Status: DISCONTINUED | OUTPATIENT
Start: 2023-11-27 | End: 2023-11-30 | Stop reason: HOSPADM

## 2023-11-27 RX ORDER — ATORVASTATIN CALCIUM 40 MG/1
40 TABLET, FILM COATED ORAL NIGHTLY
Status: DISCONTINUED | OUTPATIENT
Start: 2023-11-27 | End: 2023-11-28 | Stop reason: ALTCHOICE

## 2023-11-27 RX ORDER — IPRATROPIUM BROMIDE 21 UG/1
2 SPRAY, METERED NASAL EVERY 6 HOURS PRN
Status: DISCONTINUED | OUTPATIENT
Start: 2023-11-27 | End: 2023-11-30 | Stop reason: HOSPADM

## 2023-11-27 RX ORDER — GABAPENTIN 100 MG/1
200 CAPSULE ORAL 3 TIMES DAILY
Status: DISCONTINUED | OUTPATIENT
Start: 2023-11-27 | End: 2023-11-30 | Stop reason: HOSPADM

## 2023-11-27 RX ORDER — PANTOPRAZOLE SODIUM 40 MG/1
40 TABLET, DELAYED RELEASE ORAL
Status: DISCONTINUED | OUTPATIENT
Start: 2023-11-28 | End: 2023-11-30 | Stop reason: HOSPADM

## 2023-11-27 RX ORDER — CARVEDILOL 3.12 MG/1
12.5 TABLET ORAL 2 TIMES DAILY
Status: DISCONTINUED | OUTPATIENT
Start: 2023-11-27 | End: 2023-11-30 | Stop reason: HOSPADM

## 2023-11-27 RX ORDER — SODIUM CHLORIDE 0.9 % (FLUSH) 0.9 %
5-40 SYRINGE (ML) INJECTION EVERY 12 HOURS SCHEDULED
Status: DISCONTINUED | OUTPATIENT
Start: 2023-11-27 | End: 2023-11-30 | Stop reason: HOSPADM

## 2023-11-27 RX ORDER — 0.9 % SODIUM CHLORIDE 0.9 %
500 INTRAVENOUS SOLUTION INTRAVENOUS ONCE
Status: COMPLETED | OUTPATIENT
Start: 2023-11-27 | End: 2023-11-27

## 2023-11-27 RX ORDER — ASPIRIN 81 MG/1
81 TABLET, CHEWABLE ORAL DAILY
Status: DISCONTINUED | OUTPATIENT
Start: 2023-11-27 | End: 2023-11-27 | Stop reason: SDUPTHER

## 2023-11-27 RX ORDER — HEPARIN SODIUM 1000 [USP'U]/ML
30 INJECTION, SOLUTION INTRAVENOUS; SUBCUTANEOUS PRN
Status: DISCONTINUED | OUTPATIENT
Start: 2023-11-27 | End: 2023-11-27 | Stop reason: SDUPTHER

## 2023-11-27 RX ORDER — HEPARIN SODIUM 1000 [USP'U]/ML
60 INJECTION, SOLUTION INTRAVENOUS; SUBCUTANEOUS ONCE
Status: DISCONTINUED | OUTPATIENT
Start: 2023-11-27 | End: 2023-11-27 | Stop reason: SDUPTHER

## 2023-11-27 RX ORDER — HEPARIN SODIUM 1000 [USP'U]/ML
4000 INJECTION, SOLUTION INTRAVENOUS; SUBCUTANEOUS ONCE
Status: COMPLETED | OUTPATIENT
Start: 2023-11-27 | End: 2023-11-27

## 2023-11-27 RX ORDER — HEPARIN SODIUM 1000 [USP'U]/ML
60 INJECTION, SOLUTION INTRAVENOUS; SUBCUTANEOUS PRN
Status: DISCONTINUED | OUTPATIENT
Start: 2023-11-27 | End: 2023-11-27 | Stop reason: SDUPTHER

## 2023-11-27 RX ORDER — ESCITALOPRAM OXALATE 10 MG/1
20 TABLET ORAL DAILY
Status: DISCONTINUED | OUTPATIENT
Start: 2023-11-27 | End: 2023-11-30 | Stop reason: HOSPADM

## 2023-11-27 RX ORDER — LEVOFLOXACIN 5 MG/ML
250 INJECTION, SOLUTION INTRAVENOUS EVERY 24 HOURS
Status: CANCELLED | OUTPATIENT
Start: 2023-11-27

## 2023-11-27 RX ORDER — ONDANSETRON 4 MG/1
4 TABLET, ORALLY DISINTEGRATING ORAL EVERY 8 HOURS PRN
Status: DISCONTINUED | OUTPATIENT
Start: 2023-11-27 | End: 2023-11-30 | Stop reason: HOSPADM

## 2023-11-27 RX ORDER — NITROGLYCERIN 20 MG/100ML
10 INJECTION INTRAVENOUS CONTINUOUS
Status: DISCONTINUED | OUTPATIENT
Start: 2023-11-27 | End: 2023-11-27

## 2023-11-27 RX ORDER — IPRATROPIUM BROMIDE AND ALBUTEROL SULFATE 2.5; .5 MG/3ML; MG/3ML
1 SOLUTION RESPIRATORY (INHALATION)
Status: DISCONTINUED | OUTPATIENT
Start: 2023-11-27 | End: 2023-11-27

## 2023-11-27 RX ORDER — IPRATROPIUM BROMIDE AND ALBUTEROL SULFATE 2.5; .5 MG/3ML; MG/3ML
1 SOLUTION RESPIRATORY (INHALATION)
Status: DISCONTINUED | OUTPATIENT
Start: 2023-11-28 | End: 2023-11-30 | Stop reason: HOSPADM

## 2023-11-27 RX ORDER — SODIUM CHLORIDE 9 MG/ML
INJECTION, SOLUTION INTRAVENOUS PRN
Status: DISCONTINUED | OUTPATIENT
Start: 2023-11-27 | End: 2023-11-30 | Stop reason: HOSPADM

## 2023-11-27 RX ORDER — METHYLPREDNISOLONE SODIUM SUCCINATE 125 MG/2ML
125 INJECTION, POWDER, LYOPHILIZED, FOR SOLUTION INTRAMUSCULAR; INTRAVENOUS ONCE
Status: COMPLETED | OUTPATIENT
Start: 2023-11-27 | End: 2023-11-27

## 2023-11-27 RX ORDER — ATORVASTATIN CALCIUM 80 MG/1
80 TABLET, FILM COATED ORAL
Status: DISCONTINUED | OUTPATIENT
Start: 2023-11-27 | End: 2023-11-30 | Stop reason: HOSPADM

## 2023-11-27 RX ORDER — FINASTERIDE 5 MG/1
5 TABLET, FILM COATED ORAL DAILY
Status: DISCONTINUED | OUTPATIENT
Start: 2023-11-27 | End: 2023-11-30 | Stop reason: HOSPADM

## 2023-11-27 RX ORDER — ACETAMINOPHEN 650 MG/1
650 SUPPOSITORY RECTAL EVERY 6 HOURS PRN
Status: DISCONTINUED | OUTPATIENT
Start: 2023-11-27 | End: 2023-11-30 | Stop reason: HOSPADM

## 2023-11-27 RX ORDER — ONDANSETRON 2 MG/ML
4 INJECTION INTRAMUSCULAR; INTRAVENOUS EVERY 6 HOURS PRN
Status: DISCONTINUED | OUTPATIENT
Start: 2023-11-27 | End: 2023-11-30 | Stop reason: HOSPADM

## 2023-11-27 RX ADMIN — SODIUM CHLORIDE 500 ML: 9 INJECTION, SOLUTION INTRAVENOUS at 12:09

## 2023-11-27 RX ADMIN — HEPARIN SODIUM 4000 UNITS: 1000 INJECTION INTRAVENOUS; SUBCUTANEOUS at 15:56

## 2023-11-27 RX ADMIN — NITROGLYCERIN 10 MCG/MIN: 20 INJECTION INTRAVENOUS at 16:39

## 2023-11-27 RX ADMIN — HEPARIN SODIUM AND DEXTROSE 850 UNITS/HR: 10000; 5 INJECTION INTRAVENOUS at 16:00

## 2023-11-27 RX ADMIN — ASPIRIN 325 MG: 325 TABLET ORAL at 14:59

## 2023-11-27 RX ADMIN — IOPAMIDOL 75 ML: 755 INJECTION, SOLUTION INTRAVENOUS at 13:25

## 2023-11-27 RX ADMIN — IPRATROPIUM BROMIDE AND ALBUTEROL SULFATE 1 DOSE: 2.5; .5 SOLUTION RESPIRATORY (INHALATION) at 21:04

## 2023-11-27 RX ADMIN — METHYLPREDNISOLONE SODIUM SUCCINATE 125 MG: 125 INJECTION INTRAMUSCULAR; INTRAVENOUS at 15:01

## 2023-11-27 RX ADMIN — NITROGLYCERIN 10 MCG/MIN: 20 INJECTION INTRAVENOUS at 16:06

## 2023-11-27 ASSESSMENT — ENCOUNTER SYMPTOMS
VOMITING: 0
RHINORRHEA: 0
SINUS PRESSURE: 0
SORE THROAT: 0
ABDOMINAL PAIN: 0
SINUS PAIN: 0
CONSTIPATION: 0
SHORTNESS OF BREATH: 1
DIARRHEA: 0
EYE DISCHARGE: 0
CHEST TIGHTNESS: 0
NAUSEA: 0
EYE REDNESS: 0
COUGH: 0

## 2023-11-27 ASSESSMENT — PAIN - FUNCTIONAL ASSESSMENT: PAIN_FUNCTIONAL_ASSESSMENT: 0-10

## 2023-11-27 ASSESSMENT — PAIN SCALES - GENERAL: PAINLEVEL_OUTOF10: 0

## 2023-11-27 NOTE — H&P
mass effect or midline shift. No abnormal extra-axial fluid collection. The gray-white differentiation is maintained without evidence of an acute infarct. There is no evidence of hydrocephalus. Moderate cerebral volume loss similar to prior examination. Moderate ill-defined areas of low-attenuation within the periventricular/subcortical white matter which has increased compared to prior examination and is most often associated with chronic ischemia and or microvascular disease. Remote lacunar infarct in the left basal ganglia. ORBITS: The visualized portion of the orbits demonstrate no acute abnormality. Postsurgical changes to both globes. SINUSES: The visualized paranasal sinuses and mastoid air cells demonstrate no acute abnormality. SOFT TISSUES/SKULL:  No acute abnormality of the visualized skull or soft tissues. No acute intracranial abnormality. Moderate cerebral volume loss similar to prior examination with slightly increased mild non-specific white matter changes. XR CHEST PORTABLE    Result Date: 11/27/2023  EXAMINATION: ONE XRAY VIEW OF THE CHEST 11/27/2023 11:53 am COMPARISON: None. HISTORY: ORDERING SYSTEM PROVIDED HISTORY: Shortness of Breath TECHNOLOGIST PROVIDED HISTORY: Reason for exam:->Shortness of Breath Reason for Exam: Shortness of Breath; Fatigue FINDINGS: Diffuse airspace infiltrates most prevalent in the right lower lobe. No pneumothorax or pleural effusion. No cardiomegaly. Diffuse airspace infiltrates compatible with pneumonia.        Personally reviewed Lab Studies, Imaging    Electronically signed by Milton Narvaez MD on 11/27/2023 at 6:29 PM

## 2023-11-27 NOTE — ED PROVIDER NOTES
Emergency Department Attending Provider Note  Location: [address] [place] [address]  ED  11/27/2023     Patient Identification  Viji Bravo is a 79 y.o. male      Viji Bravo was evaluated in the Emergency Department for shortness of breath fatigue. Noted to be fatigued and out of breath at outpatient therapy today. . Although initial history and physical exam information was obtained by MONISHA/NPP/MD/DO (who also dictated a record of this visit), I personally saw the patient and performed a substantive portion of the visit including all aspects of the medical decision making. EKG Interpretation  Rhythm is sinus rhythm first-degree AV block  Rate is 60  Axis is normal  No STEMI criteria, nonspecific flattening/ST changes lateral leads  Qtc is 461    Repeat EKG no diagnostic changes      Patient seen and evaluated. Relevant records reviewed. Patient here with symptoms noted above. On exam he is well-appearing he is in no acute distress he is requiring 4 L nasal cannula increased from his baseline 2. He has crackles at bases auscultation of the lungs though no evidence of volume overload or right heart failure otherwise. He does have systolic murmur and a loud S2. EKG shows no diagnostic ischemic changes however troponin is elevated to 500 and new BNP elevation. No evidence of PE or profound pulmonary edema. Concerning for acute cardiopulmonary process. Started on heparin for NSTEMI. Plan for admission. I, Dr. Cristino Holstein, am the primary clinician of record. I personally saw the patient and independently provided 30 minutes of non-concurrent critical care out of the total shared critical care time provided. This chart was generated in part by using Dragon Dictation system and may contain errors related to that system including errors in grammar, punctuation, and spelling, as well as words and phrases that may be inappropriate.  If there are any questions or concerns please feel free to contact the
not have a PE, patient's not moving very much air on lung exam.  I ordered a breathing treatment for him and some steroids. Provided him with a small amount IV fluids. Labs returned and patient sent troponin elevated over 500, BNP elevated over 5000. Aspirin as ordered. Called and discussed this with cardiology who advised to initiate heparin. Chest x-ray showed possible pneumonia patient's not had any fevers he is not currently having any shortness of breath he denies having increasing coughing or sputum production we will add a procalcitonin and evaluate CT before we initiate any antibiotics. Urinalysis shows no evidence of any acute bacterial infection COVID and flu are negative. Patient's labs reveal acute on chronic anemia with a hemoglobin of 9.6 hematocrit of 29.1 acute on chronic kidney disease with a bun of 22 creatinine 1.5 GFR of 46. Troponin to be repeated. CT of the chest shows no evidence of an acute PE possible pulmonary arterial hypertension a hiatal hernia and end-stage lung disease. Consult to the hospitalist to admit the patient    I am the Primary Clinician of Record. FINAL IMPRESSION      1. NSTEMI (non-ST elevated myocardial infarction) ([address])    2. Acute on chronic respiratory failure, unspecified whether with hypoxia or hypercapnia ([address])    3. Chronic kidney disease, unspecified CKD stage    4. Acute on chronic anemia          DISPOSITION/PLAN     DISPOSITION Admitted 11/27/2023 03:40:02 PM      PATIENT REFERRED TO:  No follow-up provider specified.     DISCHARGE MEDICATIONS:  New Prescriptions    No medications on file       DISCONTINUED MEDICATIONS:  Discontinued Medications    No medications on file              (Please note that portions of this note were completed with a voice recognition program.  Efforts were made to edit the dictations but occasionally words are mis-transcribed.)    Lakhwinder Fang PA-C (electronically signed)           Lakhwinder Fang PA-C  11/27/23 5180

## 2023-11-27 NOTE — CONSULTS
[address] [place] [address]  [phone]      Attending Physician: Weston Salvador MD  Reason for Consultation/Chief Complaint: Shortness of breath    Subjective   History of Present Illness:  Miriam Donald is a 79 y.o. patient who presented to the hospital with complaints of shortness of breath and sweating, patient was at [facility] rehab today when he began experience shortness of breath and sweating, he denies chest pain, apparently blood pressure was on the lower side he was brought to emergency room, evaluation in the emergency room showed lung infiltrates although he does have a history of agent orange exposure and did suggest that there may be pneumonia and cardia by markers are also elevated with high since he troponin greater than 500 and proBNP level greater than 5000. In the emergency room blood pressure was notably elevated, with systolic pressures in the 170s to 180s. Patient denies any recent changes in medications although he says he has been on oxygen more lately. He does not have much recollection of events although he thinks he may have had cardiac procedure including stents at the [place] in the last few years    Has [facility], he had elevated CT calcium score, negative stress testing and plans were made for possible outpatient heart cath but it appears he may have followed up through a different health system including the [facility] system. I tried to call this patient's wife however there was no answer and a voicemail was left to get further history. Past Medical History:   has a past medical history of Asthma, CAD (coronary artery disease), CKD (chronic kidney disease), stage III ([address]), HTN (hypertension), Hyperlipidemia, Idiopathic normal pressure hydrocephalus ([address]), and Prostate cancer ([address]).     Surgical History:   has a past surgical history that includes knee surgery; Bunionectomy; eye surgery; joint

## 2023-11-27 NOTE — ED NOTES
@9055 called cardiology for consult per Tamela Wells PA-C  RE: elevated trop worsening SOB  @1345 cardiology called back and spoke with Vania Wells Mt, Sherri  11/27/23 8794

## 2023-11-28 LAB
ANION GAP SERPL CALCULATED.3IONS-SCNC: 15 MMOL/L (ref 3–16)
ANTI-XA UNFRAC HEPARIN: 0.38 IU/ML (ref 0.3–0.7)
BACTERIA URNS QL MICRO: ABNORMAL /HPF
BILIRUB UR QL STRIP.AUTO: NEGATIVE
BUN SERPL-MCNC: 26 MG/DL (ref 7–20)
CALCIUM SERPL-MCNC: 8.7 MG/DL (ref 8.3–10.6)
CHLORIDE SERPL-SCNC: 102 MMOL/L (ref 99–110)
CHOLEST SERPL-MCNC: 128 MG/DL (ref 0–199)
CLARITY UR: CLEAR
CO2 SERPL-SCNC: 18 MMOL/L (ref 21–32)
COLOR UR: YELLOW
CREAT SERPL-MCNC: 1.3 MG/DL (ref 0.8–1.3)
CREAT UR-MCNC: 117.8 MG/DL (ref 39–259)
DEPRECATED RDW RBC AUTO: 18.7 % (ref 12.4–15.4)
DEPRECATED RDW RBC AUTO: 18.8 % (ref 12.4–15.4)
EKG ATRIAL RATE: 58 BPM
EKG ATRIAL RATE: 61 BPM
EKG DIAGNOSIS: NORMAL
EKG DIAGNOSIS: NORMAL
EKG P AXIS: 19 DEGREES
EKG P AXIS: 56 DEGREES
EKG P-R INTERVAL: 216 MS
EKG P-R INTERVAL: 220 MS
EKG Q-T INTERVAL: 416 MS
EKG Q-T INTERVAL: 470 MS
EKG QRS DURATION: 102 MS
EKG QRS DURATION: 96 MS
EKG QTC CALCULATION (BAZETT): 418 MS
EKG QTC CALCULATION (BAZETT): 461 MS
EKG R AXIS: 12 DEGREES
EKG R AXIS: 13 DEGREES
EKG T AXIS: -2 DEGREES
EKG T AXIS: -36 DEGREES
EKG VENTRICULAR RATE: 58 BPM
EKG VENTRICULAR RATE: 61 BPM
EPI CELLS #/AREA URNS HPF: ABNORMAL /HPF (ref 0–5)
GFR SERPLBLD CREATININE-BSD FMLA CKD-EPI: 54 ML/MIN/{1.73_M2}
GLUCOSE SERPL-MCNC: 132 MG/DL (ref 70–99)
GLUCOSE UR STRIP.AUTO-MCNC: NEGATIVE MG/DL
HCT VFR BLD AUTO: 28.3 % (ref 40.5–52.5)
HCT VFR BLD AUTO: 28.6 % (ref 40.5–52.5)
HDLC SERPL-MCNC: 54 MG/DL (ref 40–60)
HGB BLD-MCNC: 9.5 G/DL (ref 13.5–17.5)
HGB BLD-MCNC: 9.5 G/DL (ref 13.5–17.5)
HGB UR QL STRIP.AUTO: NEGATIVE
HYALINE CASTS #/AREA URNS LPF: ABNORMAL /LPF (ref 0–2)
INR PPP: 1.3 (ref 0.84–1.16)
KETONES UR STRIP.AUTO-MCNC: NEGATIVE MG/DL
LDLC SERPL CALC-MCNC: 68 MG/DL
LEUKOCYTE ESTERASE UR QL STRIP.AUTO: NEGATIVE
MAGNESIUM SERPL-MCNC: 2.1 MG/DL (ref 1.8–2.4)
MCH RBC QN AUTO: 32 PG (ref 26–34)
MCH RBC QN AUTO: 32.3 PG (ref 26–34)
MCHC RBC AUTO-ENTMCNC: 33.3 G/DL (ref 31–36)
MCHC RBC AUTO-ENTMCNC: 33.6 G/DL (ref 31–36)
MCV RBC AUTO: 95.2 FL (ref 80–100)
MCV RBC AUTO: 97.1 FL (ref 80–100)
NITRITE UR QL STRIP.AUTO: NEGATIVE
PH UR STRIP.AUTO: 6 [PH] (ref 5–8)
PHOSPHATE SERPL-MCNC: 5.4 MG/DL (ref 2.5–4.9)
PLATELET # BLD AUTO: 267 K/UL (ref 135–450)
PLATELET # BLD AUTO: 315 K/UL (ref 135–450)
PMV BLD AUTO: 7.9 FL (ref 5–10.5)
PMV BLD AUTO: 8.7 FL (ref 5–10.5)
POTASSIUM SERPL-SCNC: 4.7 MMOL/L (ref 3.5–5.1)
PROT UR STRIP.AUTO-MCNC: 30 MG/DL
PROT UR-MCNC: 54 MG/DL
PROT/CREAT UR-RTO: 0.5 MG/DL
PROTHROMBIN TIME: 16.2 SEC (ref 11.5–14.8)
RBC # BLD AUTO: 2.95 M/UL (ref 4.2–5.9)
RBC # BLD AUTO: 2.97 M/UL (ref 4.2–5.9)
RBC #/AREA URNS HPF: ABNORMAL /HPF (ref 0–4)
SODIUM SERPL-SCNC: 135 MMOL/L (ref 136–145)
SP GR UR STRIP.AUTO: 1.02 (ref 1–1.03)
TRIGL SERPL-MCNC: 32 MG/DL (ref 0–150)
TROPONIN, HIGH SENSITIVITY: 369 NG/L (ref 0–22)
TROPONIN, HIGH SENSITIVITY: 403 NG/L (ref 0–22)
TROPONIN, HIGH SENSITIVITY: 445 NG/L (ref 0–22)
UA DIPSTICK W REFLEX MICRO PNL UR: YES
URN SPEC COLLECT METH UR: ABNORMAL
UROBILINOGEN UR STRIP-ACNC: 0.2 E.U./DL
VLDLC SERPL CALC-MCNC: 6 MG/DL
WBC # BLD AUTO: 5.4 K/UL (ref 4–11)
WBC # BLD AUTO: 5.5 K/UL (ref 4–11)
WBC #/AREA URNS HPF: ABNORMAL /HPF (ref 0–5)

## 2023-11-28 PROCEDURE — 85610 PROTHROMBIN TIME: CPT

## 2023-11-28 PROCEDURE — 83036 HEMOGLOBIN GLYCOSYLATED A1C: CPT

## 2023-11-28 PROCEDURE — 36415 COLL VENOUS BLD VENIPUNCTURE: CPT

## 2023-11-28 PROCEDURE — 80048 BASIC METABOLIC PNL TOTAL CA: CPT

## 2023-11-28 PROCEDURE — 6370000000 HC RX 637 (ALT 250 FOR IP): Performed by: STUDENT IN AN ORGANIZED HEALTH CARE EDUCATION/TRAINING PROGRAM

## 2023-11-28 PROCEDURE — 2580000003 HC RX 258: Performed by: STUDENT IN AN ORGANIZED HEALTH CARE EDUCATION/TRAINING PROGRAM

## 2023-11-28 PROCEDURE — 97162 PT EVAL MOD COMPLEX 30 MIN: CPT

## 2023-11-28 PROCEDURE — 97530 THERAPEUTIC ACTIVITIES: CPT

## 2023-11-28 PROCEDURE — 84484 ASSAY OF TROPONIN QUANT: CPT

## 2023-11-28 PROCEDURE — 84100 ASSAY OF PHOSPHORUS: CPT

## 2023-11-28 PROCEDURE — 85027 COMPLETE CBC AUTOMATED: CPT

## 2023-11-28 PROCEDURE — 87449 NOS EACH ORGANISM AG IA: CPT

## 2023-11-28 PROCEDURE — 82570 ASSAY OF URINE CREATININE: CPT

## 2023-11-28 PROCEDURE — 97166 OT EVAL MOD COMPLEX 45 MIN: CPT

## 2023-11-28 PROCEDURE — 80061 LIPID PANEL: CPT

## 2023-11-28 PROCEDURE — 94761 N-INVAS EAR/PLS OXIMETRY MLT: CPT

## 2023-11-28 PROCEDURE — 94640 AIRWAY INHALATION TREATMENT: CPT

## 2023-11-28 PROCEDURE — 83735 ASSAY OF MAGNESIUM: CPT

## 2023-11-28 PROCEDURE — 85520 HEPARIN ASSAY: CPT

## 2023-11-28 PROCEDURE — 2060000000 HC ICU INTERMEDIATE R&B

## 2023-11-28 PROCEDURE — 97535 SELF CARE MNGMENT TRAINING: CPT

## 2023-11-28 PROCEDURE — 6370000000 HC RX 637 (ALT 250 FOR IP): Performed by: NURSE PRACTITIONER

## 2023-11-28 PROCEDURE — 84156 ASSAY OF PROTEIN URINE: CPT

## 2023-11-28 PROCEDURE — 2700000000 HC OXYGEN THERAPY PER DAY

## 2023-11-28 PROCEDURE — 81001 URINALYSIS AUTO W/SCOPE: CPT

## 2023-11-28 RX ORDER — LATANOPROST 50 UG/ML
1 SOLUTION/ DROPS OPHTHALMIC NIGHTLY
Status: DISCONTINUED | OUTPATIENT
Start: 2023-11-28 | End: 2023-11-30 | Stop reason: HOSPADM

## 2023-11-28 RX ORDER — DORZOLAMIDE HYDROCHLORIDE AND TIMOLOL MALEATE 20; 5 MG/ML; MG/ML
1 SOLUTION/ DROPS OPHTHALMIC 2 TIMES DAILY
Status: DISCONTINUED | OUTPATIENT
Start: 2023-11-28 | End: 2023-11-30 | Stop reason: HOSPADM

## 2023-11-28 RX ORDER — BRIMONIDINE TARTRATE 1.5 MG/ML
1 SOLUTION/ DROPS OPHTHALMIC 2 TIMES DAILY
Status: DISCONTINUED | OUTPATIENT
Start: 2023-11-28 | End: 2023-11-28 | Stop reason: CLARIF

## 2023-11-28 RX ORDER — BRIMONIDINE TARTRATE 2 MG/ML
1 SOLUTION/ DROPS OPHTHALMIC 2 TIMES DAILY
Status: DISCONTINUED | OUTPATIENT
Start: 2023-11-28 | End: 2023-11-30 | Stop reason: HOSPADM

## 2023-11-28 RX ADMIN — IPRATROPIUM BROMIDE AND ALBUTEROL SULFATE 1 DOSE: 2.5; .5 SOLUTION RESPIRATORY (INHALATION) at 09:12

## 2023-11-28 RX ADMIN — ASPIRIN 81 MG: 81 TABLET, CHEWABLE ORAL at 09:20

## 2023-11-28 RX ADMIN — CARVEDILOL 12.5 MG: 3.12 TABLET, FILM COATED ORAL at 09:21

## 2023-11-28 RX ADMIN — DORZOLAMIDE HYDROCHLORIDE AND TIMOLOL MALEATE 1 DROP: 20; 5 SOLUTION/ DROPS OPHTHALMIC at 15:26

## 2023-11-28 RX ADMIN — Medication 10 ML: at 09:22

## 2023-11-28 RX ADMIN — ACETAMINOPHEN 650 MG: 325 TABLET ORAL at 00:07

## 2023-11-28 RX ADMIN — PANTOPRAZOLE SODIUM 40 MG: 40 TABLET, DELAYED RELEASE ORAL at 17:18

## 2023-11-28 RX ADMIN — ESCITALOPRAM OXALATE 20 MG: 10 TABLET ORAL at 09:22

## 2023-11-28 RX ADMIN — FINASTERIDE 5 MG: 5 TABLET, FILM COATED ORAL at 09:21

## 2023-11-28 RX ADMIN — GABAPENTIN 200 MG: 100 CAPSULE ORAL at 14:24

## 2023-11-28 RX ADMIN — DORZOLAMIDE HYDROCHLORIDE AND TIMOLOL MALEATE 1 DROP: 20; 5 SOLUTION/ DROPS OPHTHALMIC at 21:15

## 2023-11-28 RX ADMIN — BRIMONIDINE TARTRATE 1 DROP: 2 SOLUTION OPHTHALMIC at 15:26

## 2023-11-28 RX ADMIN — GABAPENTIN 200 MG: 100 CAPSULE ORAL at 21:14

## 2023-11-28 RX ADMIN — CLOPIDOGREL BISULFATE 75 MG: 75 TABLET ORAL at 09:21

## 2023-11-28 RX ADMIN — LATANOPROST 1 DROP: 50 SOLUTION OPHTHALMIC at 22:26

## 2023-11-28 RX ADMIN — PANTOPRAZOLE SODIUM 40 MG: 40 TABLET, DELAYED RELEASE ORAL at 09:20

## 2023-11-28 RX ADMIN — BUPROPION HYDROCHLORIDE 100 MG: 100 TABLET, EXTENDED RELEASE ORAL at 09:21

## 2023-11-28 RX ADMIN — CARVEDILOL 12.5 MG: 3.12 TABLET, FILM COATED ORAL at 21:14

## 2023-11-28 RX ADMIN — BRIMONIDINE TARTRATE 1 DROP: 2 SOLUTION OPHTHALMIC at 21:15

## 2023-11-28 RX ADMIN — ATORVASTATIN CALCIUM 80 MG: 80 TABLET, FILM COATED ORAL at 21:14

## 2023-11-28 RX ADMIN — GABAPENTIN 200 MG: 100 CAPSULE ORAL at 09:20

## 2023-11-28 ASSESSMENT — PAIN SCALES - GENERAL
PAINLEVEL_OUTOF10: 5
PAINLEVEL_OUTOF10: 0

## 2023-11-28 NOTE — CONSULTS
Thanks for consulting [facility] for the care of José Miguel Paez. Full consult will follow  Please call with questions at-  24 Hrs Answering service [phone]  Perfect serve, or cell phone 7 am - MD Digna   [facility]rology  [facility]

## 2023-11-28 NOTE — CARE COORDINATION
Case Management Assessment  Initial Evaluation    Date/Time of Evaluation: 11/28/2023 10:51 AM  Assessment Completed by: Raúl Naranjo RN    If patient is discharged prior to next notation, then this note serves as note for discharge by case management. Patient Name: Vinnie Echols                   YOB: 1940  Diagnosis: NSTEMI (non-ST elevated myocardial infarction) [facility]) [I21.4]  Acute on chronic respiratory failure, unspecified whether with hypoxia or hypercapnia ([address]) [J96.20]  Chronic kidney disease, unspecified CKD stage [N18.9]  Acute on chronic anemia [D64.9]                   Date / Time: 11/27/2023 11:04 AM    Patient Admission Status: Inpatient   Readmission Risk (Low < 19, Mod (19-27), High > 27): Readmission Risk Score: 19.8    Current PCP: Loraine Hale MD  PCP verified by CM? (P) Yes    Chart Reviewed: Yes      History Provided by: (P) Patient  Patient Orientation: (P) Alert and Oriented    Patient Cognition: (P) Alert    Hospitalization in the last 30 days (Readmission):  No    If yes, Readmission Assessment in CM Navigator will be completed.     Advance Directives:      Code Status: Full Code   Patient's Primary Decision Maker is: (P) Legal Next of Kin    Primary Decision Maker: Pavithra Villalba - Spouse - [phone]    Secondary Decision Maker: Fred Mccarthy - Child - [phone]    Discharge Planning:    Patient lives with: (P) Other (Comment) (in facility) Type of Home: (P) [address] [place] [address] Rd: (P) Other (Comment) (LTC @ [facility])  Patient Support Systems include: (P) Spouse/Significant Other, Family Members   Current Financial resources: (P) [place] [facility]  Current community resources: (P) ECF/Home Care  Current services prior to admission: (P) Extended Care Facility            Current DME:              Type of Home Care services:  (P) None    ADLS  Prior functional level: (P) Assistance with the following:, Mobility, Shopping, Housework, [address], [address], Dressing,

## 2023-11-28 NOTE — CONSULTS
Reason for admission:                 Shortness of breath/non-STEMI    Brief Summary :     Jose D Contreras is being seen by nephrology for CKD . He is a gentleman known to have chronic kidney disease COPD on 2 L of oxygen, also interstitial lung disease with significant pulmonary fibrosis admitted with shortness of breath and also found to have elevated troponin.     Interval History and plan:      He has significant crackles in the lung but going through the CT scan appears to be pulmonary fibrosis has no raised JVD and no edema so no diuretics needed at this time  Has significant CKD at baseline  Fortunately he did not have contrast nephropathy despite of CTPA done yesterday  Continue to monitor-no fluids or diuretics needed at this time  Started on Coreg for hypertension                   Assessment :     CKD Stage 3 A  Creatinine 1.3 at the time of consult-EGFR of 51 mm/min  Likely due to nephrosclerosis  Also has history of prostate cancer  Not sure if he had obstructive uropathy in the past  8/23-CT abdomen-calculus seen in the lower pole of the kidney without hydronephrosis  Creatinine peaked to 1.8 on 8/23 hospitalization  Creatinine has been elevated since at least 8/2010 when it was 1.8  But tends to be variable between 1.2 - 1.5 mostly    Echo: pending      Hypertension   BP: (158-166)/(73-80)  Pulse:  [65-69]   BP goal inpatient [phone] systolic inpatient      Anemia    Hemoglobin low  9.4 at the time of consult unlikely to be due to CKD  Platelet normal  WBC normal  Check B12 folate iron studies and  ferritin           [facility]ph[facility] would like to thank Kimberlee Montoya MD   for opportunity to serve this patient      Please call with questions at-   24 Hrs Answering service [phone] or  7 am- 5 pm via Perfect serve or cell phone  Caleb Rivera MD       HPI :     Jose D Contreras is a 79 y.o. male presented to   the hospital on 11/27/2023 with known multiple medical issues

## 2023-11-28 NOTE — CARE COORDINATION
Advance Care Planning     General Advance Care Planning (ACP) Conversation    Date of Conversation: 11/27/2023  Conducted with: Patient with Decision Making Capacity    Healthcare Decision Maker:    Primary Decision Maker: Jigna Buckley - Spouse - [phone]    Secondary Decision Maker: Zaynab Martinez - Child - [phone]  Click here to complete Healthcare Decision Makers including selection of the Healthcare Decision Maker Relationship (ie \"Primary\"). Today we documented Decision Maker(s) consistent with Legal Next of Kin hierarchy. Content/Action Overview:   Has ACP document(s) on file - reflects the patient's care preferences  Reviewed DNR/DNI and patient elects Full Code (Attempt Resuscitation)        Length of Voluntary ACP Conversation in minutes:  <16 minutes (Non-Billable)    Mandy Pratt RN

## 2023-11-28 NOTE — CONSULTS
PULMONARY AND CRITICAL CARE INPATIENT NOTE        Phil Gonzalez   : 1940  MRN: [id number]     Admitting Physician: Jennifer Arguello MD  Attending Physician: Shahrzad Patel MD  PCP: Sharon Leo MD    Admission: 2023   Date of Service: 2023    Chief Complaint   Patient presents with    Shortness of Breath     Per EMS report patient was doing his OT today when he became fatigued and SOB. Pt wears home oxygen 2 liters via NC at all times. EMS reports they increased it to 4 liters NC in route. Pt has no complaints at time of arrival.     Fatigue           ASSESSMENT & PLAN       79 y.o. 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 [facility] agent orange [facility] and was started on antifibrotic medication 2 weeks prior to admission. Pattern can fit also and UIP/IPF with possible additional injury from acid reflux, smoking, asbestos. # Enlarged pulmonary artery suggestive of pulmonary hypertension likely secondary to lung issues  # Hiatal hernia with retained fluid in the esophagus. Luque's esophagus history not on antiacids currently. Increased risk for acid reflux related lung injury. # Sleep apnea. Could not tolerate CPAP therapy  # Ex-smoker. 70 pack years.   He quit more than 45 years ago  # Minimal remote exposure to asbestos as a child  # History of recurrent pneumonia  Current management for non-STEMI with heparin, nitro drips noticed  Reviewed findings of the CAT scan with the patient  We will order some noninvasive micro workup though the patient does not seem to be having clear specific respiratory

## 2023-11-28 NOTE — CONSULTS
11/27/2023 at 2243  Anti-Xa Unfrac Heparin   0.37   IU/mL  - No change to Heparin at this time, continue Heparin infusion at _850_ units/hr. - Recheck Anti-Xa in 6 hours at [address], Pharm D.11/27/2023 11:36 PM    11/28/2023 at 0503  Anti-Xa Unfrac Heparin   0.38   IU/mL  - No change to Heparin at this time, continue Heparin infusion at _850_ units/hr.   - Recheck Anti-Xa daily  Cora Stein D.11/28/2023 6:31 AM

## 2023-11-29 LAB
ANION GAP SERPL CALCULATED.3IONS-SCNC: 10 MMOL/L (ref 3–16)
ANTI-XA UNFRAC HEPARIN: <0.1 IU/ML (ref 0.3–0.7)
BUN SERPL-MCNC: 37 MG/DL (ref 7–20)
CALCIUM SERPL-MCNC: 8.6 MG/DL (ref 8.3–10.6)
CHLORIDE SERPL-SCNC: 104 MMOL/L (ref 99–110)
CO2 SERPL-SCNC: 21 MMOL/L (ref 21–32)
CREAT SERPL-MCNC: 1.4 MG/DL (ref 0.8–1.3)
DEPRECATED RDW RBC AUTO: 18.8 % (ref 12.4–15.4)
EST. AVERAGE GLUCOSE BLD GHB EST-MCNC: 111.2 MG/DL
GFR SERPLBLD CREATININE-BSD FMLA CKD-EPI: 49 ML/MIN/{1.73_M2}
GLUCOSE SERPL-MCNC: 86 MG/DL (ref 70–99)
HBA1C MFR BLD: 5.5 %
HCT VFR BLD AUTO: 27.5 % (ref 40.5–52.5)
HGB BLD-MCNC: 9 G/DL (ref 13.5–17.5)
MAGNESIUM SERPL-MCNC: 2.1 MG/DL (ref 1.8–2.4)
MCH RBC QN AUTO: 31.4 PG (ref 26–34)
MCHC RBC AUTO-ENTMCNC: 32.8 G/DL (ref 31–36)
MCV RBC AUTO: 95.7 FL (ref 80–100)
PHOSPHATE SERPL-MCNC: 3.6 MG/DL (ref 2.5–4.9)
PLATELET # BLD AUTO: 294 K/UL (ref 135–450)
PMV BLD AUTO: 7.5 FL (ref 5–10.5)
POTASSIUM SERPL-SCNC: 3.5 MMOL/L (ref 3.5–5.1)
RBC # BLD AUTO: 2.88 M/UL (ref 4.2–5.9)
SODIUM SERPL-SCNC: 135 MMOL/L (ref 136–145)
TROPONIN, HIGH SENSITIVITY: 509 NG/L (ref 0–22)
WBC # BLD AUTO: 7.9 K/UL (ref 4–11)

## 2023-11-29 PROCEDURE — 94761 N-INVAS EAR/PLS OXIMETRY MLT: CPT

## 2023-11-29 PROCEDURE — 94640 AIRWAY INHALATION TREATMENT: CPT

## 2023-11-29 PROCEDURE — 6370000000 HC RX 637 (ALT 250 FOR IP): Performed by: STUDENT IN AN ORGANIZED HEALTH CARE EDUCATION/TRAINING PROGRAM

## 2023-11-29 PROCEDURE — 2060000000 HC ICU INTERMEDIATE R&B

## 2023-11-29 PROCEDURE — 2580000003 HC RX 258: Performed by: STUDENT IN AN ORGANIZED HEALTH CARE EDUCATION/TRAINING PROGRAM

## 2023-11-29 PROCEDURE — 97530 THERAPEUTIC ACTIVITIES: CPT

## 2023-11-29 PROCEDURE — 84484 ASSAY OF TROPONIN QUANT: CPT

## 2023-11-29 PROCEDURE — 85027 COMPLETE CBC AUTOMATED: CPT

## 2023-11-29 PROCEDURE — 6370000000 HC RX 637 (ALT 250 FOR IP): Performed by: NURSE PRACTITIONER

## 2023-11-29 PROCEDURE — 80048 BASIC METABOLIC PNL TOTAL CA: CPT

## 2023-11-29 PROCEDURE — 2700000000 HC OXYGEN THERAPY PER DAY

## 2023-11-29 PROCEDURE — 83735 ASSAY OF MAGNESIUM: CPT

## 2023-11-29 PROCEDURE — 36415 COLL VENOUS BLD VENIPUNCTURE: CPT

## 2023-11-29 PROCEDURE — 97535 SELF CARE MNGMENT TRAINING: CPT

## 2023-11-29 PROCEDURE — 84100 ASSAY OF PHOSPHORUS: CPT

## 2023-11-29 PROCEDURE — 85520 HEPARIN ASSAY: CPT

## 2023-11-29 PROCEDURE — 97110 THERAPEUTIC EXERCISES: CPT

## 2023-11-29 RX ORDER — CLOPIDOGREL BISULFATE 75 MG/1
75 TABLET ORAL DAILY
Status: CANCELLED | OUTPATIENT
Start: 2023-11-30

## 2023-11-29 RX ORDER — BISACODYL 5 MG/1
5 TABLET, DELAYED RELEASE ORAL DAILY PRN
Status: DISCONTINUED | OUTPATIENT
Start: 2023-11-29 | End: 2023-11-30 | Stop reason: HOSPADM

## 2023-11-29 RX ORDER — CLOPIDOGREL BISULFATE 75 MG/1
75 TABLET ORAL DAILY
Status: CANCELLED | OUTPATIENT
Start: 2023-11-29

## 2023-11-29 RX ADMIN — LATANOPROST 1 DROP: 50 SOLUTION OPHTHALMIC at 20:13

## 2023-11-29 RX ADMIN — ATORVASTATIN CALCIUM 80 MG: 80 TABLET, FILM COATED ORAL at 20:13

## 2023-11-29 RX ADMIN — IPRATROPIUM BROMIDE AND ALBUTEROL SULFATE 1 DOSE: 2.5; .5 SOLUTION RESPIRATORY (INHALATION) at 19:28

## 2023-11-29 RX ADMIN — CARVEDILOL 12.5 MG: 3.12 TABLET, FILM COATED ORAL at 20:13

## 2023-11-29 RX ADMIN — PANTOPRAZOLE SODIUM 40 MG: 40 TABLET, DELAYED RELEASE ORAL at 16:43

## 2023-11-29 RX ADMIN — FINASTERIDE 5 MG: 5 TABLET, FILM COATED ORAL at 08:34

## 2023-11-29 RX ADMIN — GABAPENTIN 200 MG: 100 CAPSULE ORAL at 08:31

## 2023-11-29 RX ADMIN — GABAPENTIN 200 MG: 100 CAPSULE ORAL at 20:13

## 2023-11-29 RX ADMIN — ASPIRIN 81 MG: 81 TABLET, CHEWABLE ORAL at 08:31

## 2023-11-29 RX ADMIN — BRIMONIDINE TARTRATE 1 DROP: 2 SOLUTION OPHTHALMIC at 20:13

## 2023-11-29 RX ADMIN — DORZOLAMIDE HYDROCHLORIDE AND TIMOLOL MALEATE 1 DROP: 20; 5 SOLUTION/ DROPS OPHTHALMIC at 20:13

## 2023-11-29 RX ADMIN — DORZOLAMIDE HYDROCHLORIDE AND TIMOLOL MALEATE 1 DROP: 20; 5 SOLUTION/ DROPS OPHTHALMIC at 08:31

## 2023-11-29 RX ADMIN — PANTOPRAZOLE SODIUM 40 MG: 40 TABLET, DELAYED RELEASE ORAL at 08:31

## 2023-11-29 RX ADMIN — ESCITALOPRAM OXALATE 20 MG: 10 TABLET ORAL at 08:34

## 2023-11-29 RX ADMIN — IPRATROPIUM BROMIDE AND ALBUTEROL SULFATE 1 DOSE: 2.5; .5 SOLUTION RESPIRATORY (INHALATION) at 08:41

## 2023-11-29 RX ADMIN — GABAPENTIN 200 MG: 100 CAPSULE ORAL at 14:49

## 2023-11-29 RX ADMIN — BRIMONIDINE TARTRATE 1 DROP: 2 SOLUTION OPHTHALMIC at 08:39

## 2023-11-29 RX ADMIN — BUPROPION HYDROCHLORIDE 100 MG: 100 TABLET, EXTENDED RELEASE ORAL at 08:31

## 2023-11-29 RX ADMIN — BISACODYL 5 MG: 5 TABLET, COATED ORAL at 21:37

## 2023-11-29 RX ADMIN — Medication 10 ML: at 20:13

## 2023-11-29 NOTE — CARE COORDINATION
Chart reviewed and case discussed during huddle and rounds. Pt is admitted as inpatient day #2 for NSTEMI, acute on chronic resp. Failure and o2 requirement. Pt to d/c today after Echo. Tentative transport requested for 5pm . Addendum 1605pm: transport canceled. Echo results not back yet. Plan for d/c tomorrow per Provider.

## 2023-11-29 NOTE — DISCHARGE INSTR - COC
Therapy:  Current Nutrition Therapy:   - Oral Diet:  General    Routes of Feeding: Oral  Liquids: Thin Liquids  Daily Fluid Restriction: no  Last Modified Barium Swallow with Video (Video Swallowing Test): not done    Treatments at the Time of Hospital Discharge:   Respiratory Treatments: albuterol & DUONEB  Oxygen Therapy:  is on oxygen at 2 L/min per nasal cannula. Ventilator:    - No ventilator support    Rehab Therapies: Physical Therapy and Occupational Therapy  Weight Bearing Status/Restrictions: No weight bearing restrictions  Other Medical Equipment (for information only, NOT a DME order):  walker  Other Treatments: none      Patient's personal belongings (please select all that are sent with patient):  Glasses    RN SIGNATURE:  Electronically signed by María Elena Koroma RN on 11/30/23 at 2:46 PM EST    CASE MANAGEMENT/SOCIAL WORK SECTION    Inpatient Status Date: 11/27/23    Readmission Risk Assessment Score:  Readmission Risk              Risk of Unplanned Readmission:  30           Discharging to Facility/ Agency   Name: [facility]  Phone: [phone]  Fax: [phone]    / signature: Electronically signed by Callie Piña RN on 11/29/23 at 3:31 PM EST    PHYSICIAN SECTION    Prognosis: Fair    Condition at Discharge: Stable    Rehab Potential (if transferring to Rehab): Guarded    Recommended Labs or Other Treatments After Discharge: PT/OT/SN, Follow up with PCP, Cardiology and Nephrology. Continue home oxygen at 2lpm n/c. Monitor blood pressure. Physician Certification: I certify the above information and transfer of Viji Bravo  is necessary for the continuing treatment of the diagnosis listed and that he requires [address] for less 30 days.      Update Admission H&P: No change in H&P    PHYSICIAN SIGNATURE:  Electronically signed by DYLAN Stockton CNP on 11/30/23 at 1:50 PM EST

## 2023-11-30 VITALS
SYSTOLIC BLOOD PRESSURE: 142 MMHG | HEIGHT: 69 IN | HEART RATE: 57 BPM | BODY MASS INDEX: 22.17 KG/M2 | WEIGHT: 149.69 LBS | RESPIRATION RATE: 18 BRPM | OXYGEN SATURATION: 96 % | TEMPERATURE: 97.6 F | DIASTOLIC BLOOD PRESSURE: 80 MMHG

## 2023-11-30 LAB
ANION GAP SERPL CALCULATED.3IONS-SCNC: 8 MMOL/L (ref 3–16)
BUN SERPL-MCNC: 33 MG/DL (ref 7–20)
CALCIUM SERPL-MCNC: 8.7 MG/DL (ref 8.3–10.6)
CHLORIDE SERPL-SCNC: 101 MMOL/L (ref 99–110)
CO2 SERPL-SCNC: 26 MMOL/L (ref 21–32)
CREAT SERPL-MCNC: 1.5 MG/DL (ref 0.8–1.3)
DEPRECATED RDW RBC AUTO: 18.3 % (ref 12.4–15.4)
FERRITIN SERPL IA-MCNC: 58.6 NG/ML (ref 30–400)
FOLATE SERPL-MCNC: 12.51 NG/ML (ref 4.78–24.2)
GFR SERPLBLD CREATININE-BSD FMLA CKD-EPI: 46 ML/MIN/{1.73_M2}
GLUCOSE SERPL-MCNC: 86 MG/DL (ref 70–99)
HCT VFR BLD AUTO: 26.2 % (ref 40.5–52.5)
HGB BLD-MCNC: 8.8 G/DL (ref 13.5–17.5)
IRON SATN MFR SERPL: 12 % (ref 20–50)
IRON SERPL-MCNC: 35 UG/DL (ref 59–158)
LEGIONELLA AG UR QL: NORMAL
MAGNESIUM SERPL-MCNC: 2 MG/DL (ref 1.8–2.4)
MCH RBC QN AUTO: 32.2 PG (ref 26–34)
MCHC RBC AUTO-ENTMCNC: 33.7 G/DL (ref 31–36)
MCV RBC AUTO: 95.6 FL (ref 80–100)
PHOSPHATE SERPL-MCNC: 3.6 MG/DL (ref 2.5–4.9)
PLATELET # BLD AUTO: 281 K/UL (ref 135–450)
PMV BLD AUTO: 7.1 FL (ref 5–10.5)
POTASSIUM SERPL-SCNC: 3.9 MMOL/L (ref 3.5–5.1)
RBC # BLD AUTO: 2.74 M/UL (ref 4.2–5.9)
S PNEUM AG UR QL: NORMAL
SODIUM SERPL-SCNC: 135 MMOL/L (ref 136–145)
TIBC SERPL-MCNC: 304 UG/DL (ref 260–445)
VIT B12 SERPL-MCNC: 327 PG/ML (ref 211–911)
WBC # BLD AUTO: 6.8 K/UL (ref 4–11)

## 2023-11-30 PROCEDURE — 83550 IRON BINDING TEST: CPT

## 2023-11-30 PROCEDURE — C8929 TTE W OR WO FOL WCON,DOPPLER: HCPCS

## 2023-11-30 PROCEDURE — 83735 ASSAY OF MAGNESIUM: CPT

## 2023-11-30 PROCEDURE — 6370000000 HC RX 637 (ALT 250 FOR IP): Performed by: INTERNAL MEDICINE

## 2023-11-30 PROCEDURE — 2700000000 HC OXYGEN THERAPY PER DAY

## 2023-11-30 PROCEDURE — 6360000004 HC RX CONTRAST MEDICATION: Performed by: INTERNAL MEDICINE

## 2023-11-30 PROCEDURE — 85027 COMPLETE CBC AUTOMATED: CPT

## 2023-11-30 PROCEDURE — 2580000003 HC RX 258: Performed by: STUDENT IN AN ORGANIZED HEALTH CARE EDUCATION/TRAINING PROGRAM

## 2023-11-30 PROCEDURE — 80048 BASIC METABOLIC PNL TOTAL CA: CPT

## 2023-11-30 PROCEDURE — 82728 ASSAY OF FERRITIN: CPT

## 2023-11-30 PROCEDURE — 6370000000 HC RX 637 (ALT 250 FOR IP): Performed by: STUDENT IN AN ORGANIZED HEALTH CARE EDUCATION/TRAINING PROGRAM

## 2023-11-30 PROCEDURE — 83540 ASSAY OF IRON: CPT

## 2023-11-30 PROCEDURE — 36415 COLL VENOUS BLD VENIPUNCTURE: CPT

## 2023-11-30 PROCEDURE — 94761 N-INVAS EAR/PLS OXIMETRY MLT: CPT

## 2023-11-30 PROCEDURE — 84100 ASSAY OF PHOSPHORUS: CPT

## 2023-11-30 RX ORDER — LOSARTAN POTASSIUM 25 MG/1
25 TABLET ORAL DAILY
Status: DISCONTINUED | OUTPATIENT
Start: 2023-11-30 | End: 2023-11-30 | Stop reason: HOSPADM

## 2023-11-30 RX ORDER — CARVEDILOL 12.5 MG/1
6.25 TABLET ORAL 2 TIMES DAILY
Qty: 60 TABLET | Refills: 3 | DISCHARGE
Start: 2023-11-30

## 2023-11-30 RX ORDER — LOSARTAN POTASSIUM 25 MG/1
25 TABLET ORAL DAILY
Qty: 30 TABLET | Refills: 3 | DISCHARGE
Start: 2023-12-01

## 2023-11-30 RX ADMIN — ASPIRIN 81 MG: 81 TABLET, CHEWABLE ORAL at 09:06

## 2023-11-30 RX ADMIN — DORZOLAMIDE HYDROCHLORIDE AND TIMOLOL MALEATE 1 DROP: 20; 5 SOLUTION/ DROPS OPHTHALMIC at 09:06

## 2023-11-30 RX ADMIN — LOSARTAN POTASSIUM 25 MG: 25 TABLET, FILM COATED ORAL at 11:52

## 2023-11-30 RX ADMIN — ESCITALOPRAM OXALATE 20 MG: 10 TABLET ORAL at 09:07

## 2023-11-30 RX ADMIN — BUPROPION HYDROCHLORIDE 100 MG: 100 TABLET, EXTENDED RELEASE ORAL at 09:07

## 2023-11-30 RX ADMIN — BRIMONIDINE TARTRATE 1 DROP: 2 SOLUTION OPHTHALMIC at 09:07

## 2023-11-30 RX ADMIN — Medication 10 ML: at 09:13

## 2023-11-30 RX ADMIN — PANTOPRAZOLE SODIUM 40 MG: 40 TABLET, DELAYED RELEASE ORAL at 15:54

## 2023-11-30 RX ADMIN — PANTOPRAZOLE SODIUM 40 MG: 40 TABLET, DELAYED RELEASE ORAL at 09:12

## 2023-11-30 RX ADMIN — CLOPIDOGREL BISULFATE 75 MG: 75 TABLET ORAL at 09:12

## 2023-11-30 RX ADMIN — FINASTERIDE 5 MG: 5 TABLET, FILM COATED ORAL at 09:07

## 2023-11-30 RX ADMIN — GABAPENTIN 200 MG: 100 CAPSULE ORAL at 14:34

## 2023-11-30 RX ADMIN — PERFLUTREN 1.5 ML: 6.52 INJECTION, SUSPENSION INTRAVENOUS at 09:20

## 2023-11-30 RX ADMIN — GABAPENTIN 200 MG: 100 CAPSULE ORAL at 09:07

## 2023-11-30 ASSESSMENT — PAIN SCALES - GENERAL
PAINLEVEL_OUTOF10: 0

## 2023-11-30 NOTE — DISCHARGE SUMMARY
emergency medical condition->Emergency Medical Condition (MA) Reason for Exam: near syncope today-sob Additional signs and symptoms: former smoker x 17 yrs Relevant Medical/Surgical History: stents in heart FINDINGS: Pulmonary Arteries: Pulmonary arteries are adequately opacified for evaluation. No evidence of intraluminal filling defect to suggest pulmonary embolism. There is mild prominence to both the right and left pulmonary arteries. . Mediastinum: The cardiac silhouette is enlarged with the right atrial and ventricular prominence. There are scattered small lymph nodes in the subcarinal precarinal and AP window. Lungs/pleura: Extensive interstitial changes and fibrosis some of which appear to be honeycombing and consistent with end-stage lung disease. No large infiltrate pleural effusion or pneumothorax. Upper Abdomen: Large hiatal hernia within the stomach and there is fluid refluxing into the esophagus. Several simple appearing left renal cyst. Soft Tissues/Bones: Advanced degenerative changes of the spine. No lytic or blastic lesion or obvious compression fracture. 1. Negative for pulmonary embolism. 2. Right heart dominance and pulmonary artery prominence may reflect pulmonary arterial hypertension. 3. Large hiatal hernia with fluid debris reflux into this esophagus. 4. End-stage lung disease. CT HEAD WO CONTRAST    Result Date: 11/27/2023  EXAMINATION: CT OF THE HEAD WITHOUT CONTRAST  11/27/2023 1:07 pm TECHNIQUE: CT of the head was performed without the administration of intravenous contrast. Automated exposure control, iterative reconstruction, and/or weight based adjustment of the mA/kV was utilized to reduce the radiation dose to as low as reasonably achievable.  COMPARISON: CT head July 11, 2019 HISTORY: ORDERING SYSTEM PROVIDED HISTORY: weakness, near syncope associated with SOB TECHNOLOGIST PROVIDED HISTORY: Reason for exam:->weakness, near syncope associated with SOB Has a \"code

## 2023-11-30 NOTE — CARE COORDINATION
Writer aware pt got echo done this morning, not resulted yet. Writer arranged transport via [employer] for 4:30pm to return to [facility], in anticipation of discharge.   MENA De Jesus-PATTI

## 2023-11-30 NOTE — CARE COORDINATION
CASE MANAGEMENT DISCHARGE SUMMARY      Discharge to: return to [facility] long term [facility]    Transportation:    Medical Transport explained to pt/family. Pt/family voice no agency preference. Agency used: [address] [place], [place] [address]e [facility] 4:[facility]   Ambulance form completed: Yes    Confirmed discharge plan with:   Patient: yes     Family:  Pt was going to call and update wife     Facility/Agency, name:  [facility] to be pulled from [employer], writer confirmed plan with [facility] admissions   Phone number for report to facility: [phone], name: Amanda    Note: Discharging nurse to complete [facility], reconcile AVS, and place final copy with patient's discharge packet. RN to ensure that written prescriptions for Level II medications are sent with patient to the facility as per protocol.   MENA Kaur-RN

## 2023-12-07 ENCOUNTER — HOSPITAL ENCOUNTER (EMERGENCY)
Age: 83
Discharge: HOME | End: 2023-12-07
Payer: OTHER GOVERNMENT

## 2023-12-07 VITALS
WEIGHT: 145 LBS | HEIGHT: 69 IN | TEMPERATURE: 97.6 F | HEART RATE: 95 BPM | DIASTOLIC BLOOD PRESSURE: 70 MMHG | SYSTOLIC BLOOD PRESSURE: 101 MMHG | BODY MASS INDEX: 21.48 KG/M2 | OXYGEN SATURATION: 92 % | RESPIRATION RATE: 14 BRPM

## 2023-12-07 DIAGNOSIS — R04.0 EPISTAXIS: Primary | ICD-10-CM

## 2023-12-07 PROCEDURE — 99284 EMERGENCY DEPT VISIT MOD MDM: CPT

## 2023-12-07 ASSESSMENT — PAIN - FUNCTIONAL ASSESSMENT
PAIN_FUNCTIONAL_ASSESSMENT: NONE - DENIES PAIN
PAIN_FUNCTIONAL_ASSESSMENT: NONE - DENIES PAIN

## 2023-12-07 NOTE — ED NOTES
Pt instructed to follow up with PCP. Assessed per Stas Giraldo NP.      Wan Lawrence LPN  43/10/89 [id number]

## 2023-12-07 NOTE — ED NOTES
Pt not bleeding from nares at this time. Pt on 2L oxygen NC applied.      Deonna Delacruz LPN  00/75/60 [id number]

## 2023-12-07 NOTE — ED NOTES
Writer called [facility] spoke to pt Care Nurse gave her Nurse report. Made Facility aware of pt returning to Facility with wife Hospital Oxygen Tank/d/t pt oxygen tank was empty/pt could not go without wearing Oxygen.      Alverto Montgomery, DENICE  27/34/58 [id number]

## 2023-12-07 NOTE — ED NOTES
No active bleeding noted from pt. Anais Tinsley NP at bedside assessing pt.      Xin Loomis LPN  77/34/08 [phone]

## 2023-12-09 NOTE — ED PROVIDER NOTES
[address] [place] [address]  ED  EMERGENCY DEPARTMENT ENCOUNTER        Pt Name: Nicolasa Live  MRN: [id number]  [address] [facility] [place] 1940  Date of evaluation: 12/7/2023  Provider: DYLAN Shirley - CNP  PCP: Geena Dunn MD        MONISHA. I have evaluated this patient. CHIEF COMPLAINT       Chief Complaint   Patient presents with    Epistaxis     Pt states \"the left side of my nose has been bleeding for the past x3 hrs/pt lives at [address] [place] [address]: 1 or more Elements     History From: the patient  Limitations to history : None    Nicolasa Live is a 79 y.o. male who presents to the emerged from today with complaints of a nosebleed, patient states that he had a nosebleed for the last 3 hours from his left Nare. No trauma. No other complaints. Nursing Notes were all reviewed and agreed with or any disagreements were addressed in the HPI. REVIEW OF SYSTEMS :      Review of Systems    Positives and Pertinent negatives as per HPI.      SURGICAL HISTORY     Past Surgical History:   Procedure Laterality Date    BUNIONECTOMY      CARDIAC SURGERY      coronary stents x2    COLONOSCOPY      ENDOSCOPY, COLON, DIAGNOSTIC      EYE SURGERY      Retinal reattachment - bilateral    FRACTURE SURGERY      HUMERUS FRACTURE SURGERY Left 01/19/2023    INTRAMEDULLARY NAILING OF LEFT PROXIMAL HUMERUS FRACTURE      MCKEON & NEPHEW performed by Claudette Lo, MD at [address] [place] [address] Right 03/23/2023    RIGHT HUMERUS INTRAMEDULLARY NAILING performed by Claudette Lo, MD at [address]      Bilateral knees    KNEE SURGERY      TONSILLECTOMY      UPPER GASTROINTESTINAL ENDOSCOPY N/A 08/08/2023    EGD BIOPSY performed by Say Garcia MD at [address] [place] [address]       Discharge Medication List as of 12/7/2023  1:18 PM        CONTINUE these medications which have NOT CHANGED    Details   carvedilol (COREG) 12.5 MG tablet Take 0.5

## (undated) DEVICE — HUMERAL 2.0MM X 600MM GRADUATED                                    BALL TIP GUIDE ROD: Brand: TRIGEN

## (undated) DEVICE — Device

## (undated) DEVICE — 40763 BEACH CHAIR HEAD RESTRAINT: Brand: 40763 BEACH CHAIR HEAD RESTRAINT

## (undated) DEVICE — SPONGE LAP W18XL18IN WHT COT 4 PLY FLD STRUNG RADPQ DISP ST 2 PER PACK

## (undated) DEVICE — Z INACTIVE USE 2855096 SPONGE GZ W4XL4IN 8 PLY 100% COT

## (undated) DEVICE — TRIGEN 4.0MM X 24MM SELF-TAPPING                                    CORTICAL SCREW TITANIUM
Type: IMPLANTABLE DEVICE | Site: SHOULDER | Status: NON-FUNCTIONAL
Brand: TRIGEN
Removed: 2023-01-19

## (undated) DEVICE — SUTURE DEXON/VICRYL/POLYSORB/POLYSYN VC839 CT-1/GS-21/T-12 VC839D

## (undated) DEVICE — SUTURE ETHBND EXCEL SZ 2 L30IN NONABSORBABLE GRN L40MM V-37 MX69G

## (undated) DEVICE — CONMED SCOPE SAVER BITE BLOCK, 20X27 MM: Brand: SCOPE SAVER

## (undated) DEVICE — SUTURE VCRL SZ 0 L36IN ABSRB UD CT-1 L36MM 1/2 CIR TAPR PNT VCP946H

## (undated) DEVICE — CANNULA NSL AD TBNG L7FT PVC STR NONFLARED PRNG O2 DEL W STD

## (undated) DEVICE — GUIDE PIN 3.2MM X 343MM: Brand: TRIGEN

## (undated) DEVICE — HUMERAL 3.2MM LONG GRADUATED BRAD                                    POINT DRILL: Brand: TRIGEN

## (undated) DEVICE — 3M™ IOBAN™ 2 ANTIMICROBIAL INCISE DRAPE 6650EZ: Brand: IOBAN™ 2

## (undated) DEVICE — DRAPE,REIN 53X77,STERILE: Brand: MEDLINE

## (undated) DEVICE — SUTURE MCRYL SZ 4-0 L18IN ABSRB UD L19MM PS-2 3/8 CIR PRIM Y496G

## (undated) DEVICE — MAJOR SET UP PK

## (undated) DEVICE — DRAPE,SPLIT ,77X120: Brand: MEDLINE

## (undated) DEVICE — TUBE SUCT L10IN MINI FLTR CRV KAMVAC

## (undated) DEVICE — NEEDLE HYPO 25GA L1.5IN BLU POLYPR HUB S STL REG BVL STR

## (undated) DEVICE — SOLUTION IV IRRIG 500ML 0.9% SODIUM CHL 2F7123

## (undated) DEVICE — ENDOSCOPIC KIT 2 12 FT OP4 DE2 GWN SYR

## (undated) DEVICE — SUTURE VCRL + SZ 2-0 L36IN ABSRB UD L36MM CT-1 1/2 CIR VCP945H

## (undated) DEVICE — SUTURE VCRL SZ 0 L36IN ABSRB UD L36MM CT-1 1/2 CIR J946H

## (undated) DEVICE — ELECTRODE,ECG,STRESS,FOAM,3PK: Brand: MEDLINE

## (undated) DEVICE — HANDPIECE SET WITH HIGH FLOW TIP AND SUCTION TUBE: Brand: INTERPULSE

## (undated) DEVICE — TISSUE ADHESIVE WITH DAUBER/NOZZLE ANDDERMA+FLEX QS SOURCEMARK, 0.7G PRODUCT CODE M1201 AND M1201-01: Brand: DERMA+FLEX QS

## (undated) DEVICE — GLOVE SURG SZ 75 L12IN FNGR THK79MIL GRN LTX FREE

## (undated) DEVICE — SINGLE-USE BIOPSY FORCEPS: Brand: RADIAL JAW 4

## (undated) DEVICE — APPLICATOR MEDICATED 26 CC SOLUTION HI LT ORNG CHLORAPREP

## (undated) DEVICE — HUMERAL 3.2MM SHORIGHT GRADUATED                                    BRAD POINT DRILL: Brand: TRIGEN

## (undated) DEVICE — STOCKINETTE,IMPERVIOUS,12X48,STERILE: Brand: MEDLINE

## (undated) DEVICE — ABDOMINAL PAD: Brand: DERMACEA

## (undated) DEVICE — HUMERAL 3.2MM X 248MM THREADED TIP                                    GUIDE PIN: Brand: TRIGEN

## (undated) DEVICE — GLOVE ORTHO 7   MSG9470

## (undated) DEVICE — GLOVE ORANGE PI 7 1/2   MSG9075

## (undated) DEVICE — SUTURE MCRYL + SZ 4-0 L18IN ABSRB UD L19MM PS-2 3/8 CIR MCP496G

## (undated) DEVICE — PENCIL ES CRD L10FT HND SWCHING ROCK SWCH W/ EDGE COAT BLDE

## (undated) DEVICE — ADHESIVE SKIN CLOSURE 0.7CC TOP MICROBIAL APPL DERMBND ADV

## (undated) DEVICE — ELECTRODE PT RET AD L9FT HI MOIST COND ADH HYDRGEL CORDED

## (undated) DEVICE — SHOULDER STABILIZATION KIT,                                    DISPOSABLE 12 PER BOX

## (undated) DEVICE — ROYAL SILK SURGICAL GOWN, XL: Brand: CONVERTORS

## (undated) DEVICE — GOLDVAC SLIM PUSH BUTTON SMOKE EVACUATION PENCIL 10 FT (3.0 M): Brand: GOLDVAC

## (undated) DEVICE — Z INACTIVE USE 2660664 SOLUTION IRRIG 3000ML 0.9% SOD CHL USP UROMATIC PLAS CONT

## (undated) DEVICE — 3M™ STERI-DRAPE™ U-DRAPE 1067 1067 5/BX 4BX/CS/CTN&#X20;: Brand: STERI-DRAPE™

## (undated) DEVICE — SLING ARM L L165IN D75IN WHT POLY MESH ENVELOP MTL SIDE

## (undated) DEVICE — FLUID CONTROL SHOULDER PACK: Brand: CONVERTORS

## (undated) DEVICE — TRIGEN 4.0MM X 40MM SELF-TAPPING                                    CORTICAL SCREW TITANIUM
Type: IMPLANTABLE DEVICE | Site: SHOULDER | Status: NON-FUNCTIONAL
Brand: TRIGEN
Removed: 2023-01-19

## (undated) DEVICE — SUTURE VCRL + SZ 0 L27IN ABSRB UD L36MM CT-1 1/2 CIR VCPP41D

## (undated) DEVICE — FORCEPS BX L240CM JAW DIA2.8MM L CAP W/ NDL MIC MESH TOOTH

## (undated) DEVICE — C-ARM: Brand: UNBRANDED

## (undated) DEVICE — SOLUTION IV 1000ML LAC RINGERS PH 6.5 INJ USP VIAFLX PLAS